# Patient Record
Sex: FEMALE | Race: WHITE | Employment: OTHER | URBAN - METROPOLITAN AREA
[De-identification: names, ages, dates, MRNs, and addresses within clinical notes are randomized per-mention and may not be internally consistent; named-entity substitution may affect disease eponyms.]

---

## 2017-07-10 PROBLEM — J30.1 SEASONAL ALLERGIC RHINITIS DUE TO POLLEN: Status: ACTIVE | Noted: 2017-07-10

## 2018-09-17 ENCOUNTER — HOSPITAL ENCOUNTER (OUTPATIENT)
Age: 73
Discharge: HOME | End: 2018-09-19
Payer: MEDICARE

## 2018-09-17 PROCEDURE — 84436 ASSAY OF TOTAL THYROXINE: CPT

## 2018-09-17 PROCEDURE — 80053 COMPREHEN METABOLIC PANEL: CPT

## 2018-09-17 PROCEDURE — 84479 ASSAY OF THYROID (T3 OR T4): CPT

## 2018-09-17 PROCEDURE — 85025 COMPLETE CBC W/AUTO DIFF WBC: CPT

## 2018-09-17 PROCEDURE — 84480 ASSAY TRIIODOTHYRONINE (T3): CPT

## 2018-09-17 PROCEDURE — 83036 HEMOGLOBIN GLYCOSYLATED A1C: CPT

## 2018-09-17 PROCEDURE — 80061 LIPID PANEL: CPT

## 2018-09-17 PROCEDURE — 84443 ASSAY THYROID STIM HORMONE: CPT

## 2018-09-18 LAB
ALBUMIN SERPL-MCNC: 4.3 G/DL (ref 3.5–5.2)
ALP BLD-CCNC: 48 U/L (ref 35–104)
ALT SERPL-CCNC: 13 U/L (ref 0–32)
ANION GAP SERPL CALCULATED.3IONS-SCNC: 21 MMOL/L (ref 7–16)
AST SERPL-CCNC: 18 U/L (ref 0–31)
BASOPHILS ABSOLUTE: 0.04 E9/L (ref 0–0.2)
BASOPHILS RELATIVE PERCENT: 0.6 % (ref 0–2)
BILIRUB SERPL-MCNC: 1.2 MG/DL (ref 0–1.2)
BUN BLDV-MCNC: 21 MG/DL (ref 8–23)
CALCIUM SERPL-MCNC: 9.3 MG/DL (ref 8.6–10.2)
CHLORIDE BLD-SCNC: 103 MMOL/L (ref 98–107)
CHOLESTEROL, TOTAL: 164 MG/DL (ref 0–199)
CO2: 19 MMOL/L (ref 22–29)
CREAT SERPL-MCNC: 1.1 MG/DL (ref 0.5–1)
EOSINOPHILS ABSOLUTE: 0.14 E9/L (ref 0.05–0.5)
EOSINOPHILS RELATIVE PERCENT: 2 % (ref 0–6)
GFR AFRICAN AMERICAN: 59
GFR NON-AFRICAN AMERICAN: 49 ML/MIN/1.73
GLUCOSE BLD-MCNC: 93 MG/DL (ref 74–109)
HBA1C MFR BLD: 5.7 % (ref 4–5.6)
HCT VFR BLD CALC: 44 % (ref 34–48)
HDLC SERPL-MCNC: 45 MG/DL
HEMOGLOBIN: 13.7 G/DL (ref 11.5–15.5)
IMMATURE GRANULOCYTES #: 0.02 E9/L
IMMATURE GRANULOCYTES %: 0.3 % (ref 0–5)
LDL CHOLESTEROL CALCULATED: 91 MG/DL (ref 0–99)
LYMPHOCYTES ABSOLUTE: 2.16 E9/L (ref 1.5–4)
LYMPHOCYTES RELATIVE PERCENT: 31 % (ref 20–42)
MCH RBC QN AUTO: 31.7 PG (ref 26–35)
MCHC RBC AUTO-ENTMCNC: 31.1 % (ref 32–34.5)
MCV RBC AUTO: 101.9 FL (ref 80–99.9)
MONOCYTES ABSOLUTE: 0.53 E9/L (ref 0.1–0.95)
MONOCYTES RELATIVE PERCENT: 7.6 % (ref 2–12)
NEUTROPHILS ABSOLUTE: 4.08 E9/L (ref 1.8–7.3)
NEUTROPHILS RELATIVE PERCENT: 58.5 % (ref 43–80)
PDW BLD-RTO: 12.9 FL (ref 11.5–15)
PLATELET # BLD: 221 E9/L (ref 130–450)
PMV BLD AUTO: 11.6 FL (ref 7–12)
POTASSIUM SERPL-SCNC: 4.1 MMOL/L (ref 3.5–5)
RBC # BLD: 4.32 E12/L (ref 3.5–5.5)
SODIUM BLD-SCNC: 143 MMOL/L (ref 132–146)
T3 TOTAL: 133.9 NG/DL (ref 80–200)
T3 UPTAKE PERCENT: 33.2 % (ref 22.5–37)
T4 TOTAL: 7.6 MCG/DL (ref 4.5–11.7)
TOTAL PROTEIN: 6.4 G/DL (ref 6.4–8.3)
TRIGL SERPL-MCNC: 142 MG/DL (ref 0–149)
TSH SERPL DL<=0.05 MIU/L-ACNC: 3.14 UIU/ML (ref 0.27–4.2)
VLDLC SERPL CALC-MCNC: 28 MG/DL
WBC # BLD: 7 E9/L (ref 4.5–11.5)

## 2019-01-01 ENCOUNTER — HOSPITAL ENCOUNTER (OUTPATIENT)
Age: 74
Discharge: HOME | End: 2019-07-25
Payer: MEDICARE

## 2019-01-01 ENCOUNTER — HOSPITAL ENCOUNTER (OUTPATIENT)
Age: 74
Discharge: HOME | End: 2019-12-06
Payer: MEDICARE

## 2019-01-01 ENCOUNTER — HOSPITAL ENCOUNTER (OUTPATIENT)
Dept: GENERAL RADIOLOGY | Age: 74
Discharge: HOME | End: 2019-12-06
Payer: MEDICARE

## 2019-01-01 ENCOUNTER — HOSPITAL ENCOUNTER (OUTPATIENT)
Age: 74
Discharge: HOME | End: 2019-12-02
Payer: MEDICARE

## 2019-01-01 ENCOUNTER — HOSPITAL ENCOUNTER (OUTPATIENT)
Dept: GENERAL RADIOLOGY | Age: 74
Discharge: HOME | End: 2019-10-25
Payer: MEDICARE

## 2019-01-01 ENCOUNTER — HOSPITAL ENCOUNTER (OUTPATIENT)
Age: 74
Discharge: HOME | End: 2019-10-25
Payer: MEDICARE

## 2019-01-01 DIAGNOSIS — R05.9 COUGH: ICD-10-CM

## 2019-01-01 DIAGNOSIS — J31.0 CHRONIC RHINITIS: ICD-10-CM

## 2019-01-01 DIAGNOSIS — M54.6 DISCOGENIC THORACIC PAIN: ICD-10-CM

## 2019-01-01 DIAGNOSIS — I10 ESSENTIAL HYPERTENSION: ICD-10-CM

## 2019-01-01 LAB
ANION GAP SERPL CALCULATED.3IONS-SCNC: 13 MMOL/L (ref 7–16)
BUN BLDV-MCNC: 17 MG/DL (ref 8–23)
CALCIUM SERPL-MCNC: 9.4 MG/DL (ref 8.6–10.2)
CHLORIDE BLD-SCNC: 107 MMOL/L (ref 98–107)
CO2: 23 MMOL/L (ref 22–29)
CREAT SERPL-MCNC: 1 MG/DL (ref 0.5–1)
GFR AFRICAN AMERICAN: >60
GFR NON-AFRICAN AMERICAN: 54 ML/MIN/1.73
GLUCOSE BLD-MCNC: 118 MG/DL (ref 74–99)
POTASSIUM SERPL-SCNC: 4.4 MMOL/L (ref 3.5–5)
SODIUM BLD-SCNC: 143 MMOL/L (ref 132–146)

## 2019-01-01 PROCEDURE — 72072 X-RAY EXAM THORAC SPINE 3VWS: CPT

## 2019-01-01 PROCEDURE — 80048 BASIC METABOLIC PNL TOTAL CA: CPT

## 2019-01-01 PROCEDURE — 70220 X-RAY EXAM OF SINUSES: CPT

## 2019-01-01 PROCEDURE — 71046 X-RAY EXAM CHEST 2 VIEWS: CPT

## 2019-02-13 ENCOUNTER — HOSPITAL ENCOUNTER (EMERGENCY)
Age: 74
Discharge: HOME | End: 2019-02-13
Attending: EMERGENCY MEDICINE
Payer: MEDICARE

## 2019-02-13 ENCOUNTER — APPOINTMENT (OUTPATIENT)
Dept: CT IMAGING | Age: 74
End: 2019-02-13
Payer: MEDICARE

## 2019-02-13 VITALS
OXYGEN SATURATION: 95 % | WEIGHT: 138 LBS | RESPIRATION RATE: 16 BRPM | HEIGHT: 61 IN | BODY MASS INDEX: 26.06 KG/M2 | TEMPERATURE: 98.2 F | HEART RATE: 62 BPM | SYSTOLIC BLOOD PRESSURE: 129 MMHG | DIASTOLIC BLOOD PRESSURE: 65 MMHG

## 2019-02-13 DIAGNOSIS — R11.0 NAUSEA: ICD-10-CM

## 2019-02-13 DIAGNOSIS — R10.9 ABDOMINAL PAIN, UNSPECIFIED ABDOMINAL LOCATION: Primary | ICD-10-CM

## 2019-02-13 LAB
ALBUMIN SERPL-MCNC: 4.4 G/DL (ref 3.5–5.2)
ALP BLD-CCNC: 66 U/L (ref 35–104)
ALT SERPL-CCNC: 14 U/L (ref 0–32)
ANION GAP SERPL CALCULATED.3IONS-SCNC: 12 MMOL/L (ref 7–16)
AST SERPL-CCNC: 17 U/L (ref 0–31)
BACTERIA: ABNORMAL /HPF
BASOPHILS ABSOLUTE: 0.05 E9/L (ref 0–0.2)
BASOPHILS RELATIVE PERCENT: 0.6 % (ref 0–2)
BILIRUB SERPL-MCNC: 1.3 MG/DL (ref 0–1.2)
BILIRUBIN URINE: NEGATIVE
BLOOD, URINE: ABNORMAL
BUN BLDV-MCNC: 13 MG/DL (ref 8–23)
CALCIUM SERPL-MCNC: 9.4 MG/DL (ref 8.6–10.2)
CHLORIDE BLD-SCNC: 106 MMOL/L (ref 98–107)
CLARITY: CLEAR
CO2: 26 MMOL/L (ref 22–29)
COLOR: YELLOW
CREAT SERPL-MCNC: 0.9 MG/DL (ref 0.5–1)
EOSINOPHILS ABSOLUTE: 0.12 E9/L (ref 0.05–0.5)
EOSINOPHILS RELATIVE PERCENT: 1.5 % (ref 0–6)
GFR AFRICAN AMERICAN: >60
GFR NON-AFRICAN AMERICAN: >60 ML/MIN/1.73
GLUCOSE BLD-MCNC: 109 MG/DL (ref 74–99)
GLUCOSE URINE: NEGATIVE MG/DL
HCT VFR BLD CALC: 43.5 % (ref 34–48)
HEMOGLOBIN: 14.6 G/DL (ref 11.5–15.5)
IMMATURE GRANULOCYTES #: 0.03 E9/L
IMMATURE GRANULOCYTES %: 0.4 % (ref 0–5)
KETONES, URINE: NEGATIVE MG/DL
LEUKOCYTE ESTERASE, URINE: ABNORMAL
LIPASE: 16 U/L (ref 13–60)
LYMPHOCYTES ABSOLUTE: 1.75 E9/L (ref 1.5–4)
LYMPHOCYTES RELATIVE PERCENT: 22.2 % (ref 20–42)
MCH RBC QN AUTO: 31.9 PG (ref 26–35)
MCHC RBC AUTO-ENTMCNC: 33.6 % (ref 32–34.5)
MCV RBC AUTO: 95.2 FL (ref 80–99.9)
MONOCYTES ABSOLUTE: 0.66 E9/L (ref 0.1–0.95)
MONOCYTES RELATIVE PERCENT: 8.4 % (ref 2–12)
NEUTROPHILS ABSOLUTE: 5.26 E9/L (ref 1.8–7.3)
NEUTROPHILS RELATIVE PERCENT: 66.9 % (ref 43–80)
NITRITE, URINE: NEGATIVE
PDW BLD-RTO: 12.9 FL (ref 11.5–15)
PH UA: 7 (ref 5–9)
PLATELET # BLD: 239 E9/L (ref 130–450)
PMV BLD AUTO: 11.2 FL (ref 7–12)
POTASSIUM REFLEX MAGNESIUM: 4.1 MMOL/L (ref 3.5–5)
PROTEIN UA: NEGATIVE MG/DL
RBC # BLD: 4.57 E12/L (ref 3.5–5.5)
RBC UA: ABNORMAL /HPF (ref 0–2)
SODIUM BLD-SCNC: 144 MMOL/L (ref 132–146)
SPECIFIC GRAVITY UA: <=1.005 (ref 1–1.03)
TOTAL PROTEIN: 6.8 G/DL (ref 6.4–8.3)
TROPONIN: <0.01 NG/ML (ref 0–0.03)
UROBILINOGEN, URINE: 0.2 E.U./DL
WBC # BLD: 7.9 E9/L (ref 4.5–11.5)
WBC UA: ABNORMAL /HPF (ref 0–5)

## 2019-02-13 PROCEDURE — 99284 EMERGENCY DEPT VISIT MOD MDM: CPT

## 2019-02-13 PROCEDURE — 6360000004 HC RX CONTRAST MEDICATION: Performed by: RADIOLOGY

## 2019-02-13 PROCEDURE — 6360000002 HC RX W HCPCS: Performed by: EMERGENCY MEDICINE

## 2019-02-13 PROCEDURE — 84484 ASSAY OF TROPONIN QUANT: CPT

## 2019-02-13 PROCEDURE — 96374 THER/PROPH/DIAG INJ IV PUSH: CPT

## 2019-02-13 PROCEDURE — 2580000003 HC RX 258: Performed by: RADIOLOGY

## 2019-02-13 PROCEDURE — 81001 URINALYSIS AUTO W/SCOPE: CPT

## 2019-02-13 PROCEDURE — 96375 TX/PRO/DX INJ NEW DRUG ADDON: CPT

## 2019-02-13 PROCEDURE — 6370000000 HC RX 637 (ALT 250 FOR IP): Performed by: EMERGENCY MEDICINE

## 2019-02-13 PROCEDURE — 80053 COMPREHEN METABOLIC PANEL: CPT

## 2019-02-13 PROCEDURE — 2500000003 HC RX 250 WO HCPCS: Performed by: EMERGENCY MEDICINE

## 2019-02-13 PROCEDURE — 74177 CT ABD & PELVIS W/CONTRAST: CPT

## 2019-02-13 PROCEDURE — 83690 ASSAY OF LIPASE: CPT

## 2019-02-13 PROCEDURE — 85025 COMPLETE CBC W/AUTO DIFF WBC: CPT

## 2019-02-13 PROCEDURE — 2580000003 HC RX 258: Performed by: EMERGENCY MEDICINE

## 2019-02-13 RX ORDER — ACETAMINOPHEN 325 MG/1
650 TABLET ORAL ONCE
Status: COMPLETED | OUTPATIENT
Start: 2019-02-13 | End: 2019-02-13

## 2019-02-13 RX ORDER — ONDANSETRON 2 MG/ML
4 INJECTION INTRAMUSCULAR; INTRAVENOUS ONCE
Status: COMPLETED | OUTPATIENT
Start: 2019-02-13 | End: 2019-02-13

## 2019-02-13 RX ORDER — 0.9 % SODIUM CHLORIDE 0.9 %
1000 INTRAVENOUS SOLUTION INTRAVENOUS ONCE
Status: COMPLETED | OUTPATIENT
Start: 2019-02-13 | End: 2019-02-13

## 2019-02-13 RX ORDER — SODIUM CHLORIDE 0.9 % (FLUSH) 0.9 %
10 SYRINGE (ML) INJECTION ONCE
Status: COMPLETED | OUTPATIENT
Start: 2019-02-13 | End: 2019-02-13

## 2019-02-13 RX ORDER — FAMOTIDINE 20 MG/1
20 TABLET, FILM COATED ORAL 2 TIMES DAILY
Qty: 14 TABLET | Refills: 0 | Status: SHIPPED | OUTPATIENT
Start: 2019-02-13 | End: 2019-01-01 | Stop reason: ALTCHOICE

## 2019-02-13 RX ORDER — DOCUSATE SODIUM 100 MG/1
100 CAPSULE, LIQUID FILLED ORAL 2 TIMES DAILY
Qty: 20 CAPSULE | Refills: 0 | Status: SHIPPED | OUTPATIENT
Start: 2019-02-13 | End: 2019-02-23

## 2019-02-13 RX ORDER — ONDANSETRON 4 MG/1
4 TABLET, ORALLY DISINTEGRATING ORAL EVERY 8 HOURS PRN
Qty: 10 TABLET | Refills: 0 | Status: SHIPPED | OUTPATIENT
Start: 2019-02-13 | End: 2019-04-08

## 2019-02-13 RX ADMIN — FAMOTIDINE 20 MG: 10 INJECTION, SOLUTION INTRAVENOUS at 12:00

## 2019-02-13 RX ADMIN — ACETAMINOPHEN 650 MG: 325 TABLET ORAL at 14:11

## 2019-02-13 RX ADMIN — SODIUM CHLORIDE 1000 ML: 9 INJECTION, SOLUTION INTRAVENOUS at 12:00

## 2019-02-13 RX ADMIN — ONDANSETRON 4 MG: 2 INJECTION INTRAMUSCULAR; INTRAVENOUS at 12:00

## 2019-02-13 RX ADMIN — IOPAMIDOL 110 ML: 755 INJECTION, SOLUTION INTRAVENOUS at 13:05

## 2019-02-13 RX ADMIN — Medication 10 ML: at 13:05

## 2019-02-13 ASSESSMENT — ENCOUNTER SYMPTOMS
COUGH: 0
SINUS PAIN: 0
BLOOD IN STOOL: 0
CHEST TIGHTNESS: 0
SHORTNESS OF BREATH: 0
NAUSEA: 1
VOMITING: 0
BACK PAIN: 0
DIARRHEA: 0
SORE THROAT: 0
ABDOMINAL PAIN: 1

## 2019-02-13 ASSESSMENT — PAIN DESCRIPTION - LOCATION: LOCATION: ABDOMEN

## 2019-02-13 ASSESSMENT — PAIN SCALES - GENERAL
PAINLEVEL_OUTOF10: 4
PAINLEVEL_OUTOF10: 4
PAINLEVEL_OUTOF10: 5

## 2019-02-13 ASSESSMENT — PAIN DESCRIPTION - DESCRIPTORS: DESCRIPTORS: ACHING

## 2019-02-13 ASSESSMENT — PAIN DESCRIPTION - ORIENTATION: ORIENTATION: LOWER

## 2019-02-24 LAB
EKG ATRIAL RATE: 60 BPM
EKG P AXIS: 76 DEGREES
EKG P-R INTERVAL: 148 MS
EKG Q-T INTERVAL: 442 MS
EKG QRS DURATION: 86 MS
EKG QTC CALCULATION (BAZETT): 442 MS
EKG R AXIS: 60 DEGREES
EKG T AXIS: 55 DEGREES
EKG VENTRICULAR RATE: 60 BPM

## 2019-03-25 ENCOUNTER — HOSPITAL ENCOUNTER (OUTPATIENT)
Age: 74
Discharge: HOME | End: 2019-03-27
Payer: MEDICARE

## 2019-03-25 DIAGNOSIS — E78.5 HYPERLIPIDEMIA, UNSPECIFIED HYPERLIPIDEMIA TYPE: ICD-10-CM

## 2019-03-25 DIAGNOSIS — E03.9 HYPOTHYROIDISM, UNSPECIFIED TYPE: ICD-10-CM

## 2019-03-25 LAB
ALBUMIN SERPL-MCNC: 4.6 G/DL (ref 3.5–5.2)
ALP BLD-CCNC: 54 U/L (ref 35–104)
ALT SERPL-CCNC: 16 U/L (ref 0–32)
ANION GAP SERPL CALCULATED.3IONS-SCNC: 15 MMOL/L (ref 7–16)
AST SERPL-CCNC: 23 U/L (ref 0–31)
BASOPHILS ABSOLUTE: 0.04 E9/L (ref 0–0.2)
BASOPHILS RELATIVE PERCENT: 0.6 % (ref 0–2)
BILIRUB SERPL-MCNC: 1.1 MG/DL (ref 0–1.2)
BUN BLDV-MCNC: 18 MG/DL (ref 8–23)
CALCIUM SERPL-MCNC: 9.5 MG/DL (ref 8.6–10.2)
CHLORIDE BLD-SCNC: 106 MMOL/L (ref 98–107)
CHOLESTEROL, TOTAL: 169 MG/DL (ref 0–199)
CO2: 25 MMOL/L (ref 22–29)
CREAT SERPL-MCNC: 1 MG/DL (ref 0.5–1)
EOSINOPHILS ABSOLUTE: 0.18 E9/L (ref 0.05–0.5)
EOSINOPHILS RELATIVE PERCENT: 2.6 % (ref 0–6)
GFR AFRICAN AMERICAN: >60
GFR NON-AFRICAN AMERICAN: 54 ML/MIN/1.73
GLUCOSE FASTING: 100 MG/DL (ref 74–99)
HCT VFR BLD CALC: 45.4 % (ref 34–48)
HDLC SERPL-MCNC: 46 MG/DL
HEMOGLOBIN: 14.1 G/DL (ref 11.5–15.5)
IMMATURE GRANULOCYTES #: 0.02 E9/L
IMMATURE GRANULOCYTES %: 0.3 % (ref 0–5)
LDL CHOLESTEROL CALCULATED: 93 MG/DL (ref 0–99)
LYMPHOCYTES ABSOLUTE: 2.17 E9/L (ref 1.5–4)
LYMPHOCYTES RELATIVE PERCENT: 31 % (ref 20–42)
MCH RBC QN AUTO: 31.4 PG (ref 26–35)
MCHC RBC AUTO-ENTMCNC: 31.1 % (ref 32–34.5)
MCV RBC AUTO: 101.1 FL (ref 80–99.9)
MONOCYTES ABSOLUTE: 0.47 E9/L (ref 0.1–0.95)
MONOCYTES RELATIVE PERCENT: 6.7 % (ref 2–12)
NEUTROPHILS ABSOLUTE: 4.13 E9/L (ref 1.8–7.3)
NEUTROPHILS RELATIVE PERCENT: 58.8 % (ref 43–80)
PDW BLD-RTO: 13.4 FL (ref 11.5–15)
PLATELET # BLD: 265 E9/L (ref 130–450)
PMV BLD AUTO: 11.7 FL (ref 7–12)
POTASSIUM SERPL-SCNC: 4.9 MMOL/L (ref 3.5–5)
RBC # BLD: 4.49 E12/L (ref 3.5–5.5)
SODIUM BLD-SCNC: 146 MMOL/L (ref 132–146)
TOTAL PROTEIN: 7.1 G/DL (ref 6.4–8.3)
TRIGL SERPL-MCNC: 149 MG/DL (ref 0–149)
TSH SERPL DL<=0.05 MIU/L-ACNC: 3.32 UIU/ML (ref 0.27–4.2)
VLDLC SERPL CALC-MCNC: 30 MG/DL
WBC # BLD: 7 E9/L (ref 4.5–11.5)

## 2019-03-25 PROCEDURE — 85025 COMPLETE CBC W/AUTO DIFF WBC: CPT

## 2019-03-25 PROCEDURE — 80061 LIPID PANEL: CPT

## 2019-03-25 PROCEDURE — 80053 COMPREHEN METABOLIC PANEL: CPT

## 2019-03-25 PROCEDURE — 84443 ASSAY THYROID STIM HORMONE: CPT

## 2019-04-08 PROBLEM — I10 ESSENTIAL HYPERTENSION: Status: ACTIVE | Noted: 2019-04-08

## 2019-04-08 PROBLEM — E03.9 ACQUIRED HYPOTHYROIDISM: Status: ACTIVE | Noted: 2019-04-08

## 2019-04-08 PROBLEM — E78.2 MIXED HYPERLIPIDEMIA: Status: ACTIVE | Noted: 2019-04-08

## 2019-04-23 ENCOUNTER — HOSPITAL ENCOUNTER (OUTPATIENT)
Dept: MAMMOGRAPHY | Age: 74
Discharge: HOME | End: 2019-04-25
Payer: MEDICARE

## 2019-04-23 DIAGNOSIS — M81.0 OSTEOPOROSIS WITHOUT CURRENT PATHOLOGICAL FRACTURE, UNSPECIFIED OSTEOPOROSIS TYPE: ICD-10-CM

## 2019-04-23 PROCEDURE — 77080 DXA BONE DENSITY AXIAL: CPT

## 2019-05-06 ENCOUNTER — HOSPITAL ENCOUNTER (OUTPATIENT)
Age: 74
Discharge: HOME | End: 2019-05-08
Payer: MEDICARE

## 2019-05-06 DIAGNOSIS — N39.43 DRIBBLING OF URINE: ICD-10-CM

## 2019-05-06 DIAGNOSIS — R30.9 PAINFUL URINATION: ICD-10-CM

## 2019-05-06 PROCEDURE — 87088 URINE BACTERIA CULTURE: CPT

## 2019-05-06 PROCEDURE — 81001 URINALYSIS AUTO W/SCOPE: CPT

## 2019-05-07 LAB
BACTERIA: ABNORMAL /HPF
BILIRUBIN URINE: NEGATIVE
BLOOD, URINE: NEGATIVE
CLARITY: CLEAR
COLOR: YELLOW
GLUCOSE URINE: NEGATIVE MG/DL
KETONES, URINE: NEGATIVE MG/DL
LEUKOCYTE ESTERASE, URINE: ABNORMAL
NITRITE, URINE: NEGATIVE
PH UA: 5.5 (ref 5–9)
PROTEIN UA: ABNORMAL MG/DL
RBC UA: ABNORMAL /HPF (ref 0–2)
SPECIFIC GRAVITY UA: 1.02 (ref 1–1.03)
UROBILINOGEN, URINE: 0.2 E.U./DL
WBC UA: ABNORMAL /HPF (ref 0–5)

## 2019-05-08 LAB — URINE CULTURE, ROUTINE: NORMAL

## 2019-05-17 ENCOUNTER — HOSPITAL ENCOUNTER (OUTPATIENT)
Age: 74
Discharge: HOME | End: 2019-05-19

## 2019-05-17 PROCEDURE — 87081 CULTURE SCREEN ONLY: CPT

## 2019-05-17 PROCEDURE — 88305 TISSUE EXAM BY PATHOLOGIST: CPT

## 2019-05-18 LAB — CLOTEST: NORMAL

## 2019-10-21 PROBLEM — M54.6 DISCOGENIC THORACIC PAIN: Status: ACTIVE | Noted: 2019-01-01

## 2019-10-21 PROBLEM — R05.9 COUGH: Status: ACTIVE | Noted: 2019-01-01

## 2019-11-20 PROBLEM — R05.9 COUGH: Status: RESOLVED | Noted: 2019-01-01 | Resolved: 2019-01-01

## 2019-11-27 PROBLEM — J32.9 CHRONIC SINUSITIS: Status: ACTIVE | Noted: 2019-01-01

## 2020-01-01 ENCOUNTER — HOSPITAL ENCOUNTER (OUTPATIENT)
Age: 75
Setting detail: OBSERVATION
Discharge: HOME | End: 2020-04-29
Attending: EMERGENCY MEDICINE | Admitting: FAMILY MEDICINE
Payer: MEDICARE

## 2020-01-01 ENCOUNTER — APPOINTMENT (OUTPATIENT)
Dept: ULTRASOUND IMAGING | Age: 75
DRG: 003 | End: 2020-01-01
Payer: MEDICARE

## 2020-01-01 ENCOUNTER — APPOINTMENT (OUTPATIENT)
Dept: GENERAL RADIOLOGY | Age: 75
DRG: 003 | End: 2020-01-01
Payer: MEDICARE

## 2020-01-01 ENCOUNTER — HOSPITAL ENCOUNTER (OUTPATIENT)
Age: 75
Discharge: HOME | End: 2020-03-13
Payer: MEDICARE

## 2020-01-01 ENCOUNTER — ANESTHESIA (OUTPATIENT)
Dept: OPERATING ROOM | Age: 75
DRG: 003 | End: 2020-01-01
Payer: MEDICARE

## 2020-01-01 ENCOUNTER — APPOINTMENT (OUTPATIENT)
Dept: CT IMAGING | Age: 75
End: 2020-01-01
Payer: MEDICARE

## 2020-01-01 ENCOUNTER — APPOINTMENT (OUTPATIENT)
Dept: NON INVASIVE DIAGNOSTICS | Age: 75
DRG: 003 | End: 2020-01-01
Payer: MEDICARE

## 2020-01-01 ENCOUNTER — HOSPITAL ENCOUNTER (INPATIENT)
Age: 75
LOS: 11 days | DRG: 003 | End: 2020-06-08
Attending: EMERGENCY MEDICINE | Admitting: FAMILY MEDICINE
Payer: MEDICARE

## 2020-01-01 ENCOUNTER — APPOINTMENT (OUTPATIENT)
Dept: CARDIAC CATH/INVASIVE PROCEDURES | Age: 75
DRG: 003 | End: 2020-01-01
Payer: MEDICARE

## 2020-01-01 ENCOUNTER — APPOINTMENT (OUTPATIENT)
Dept: NUCLEAR MEDICINE | Age: 75
DRG: 003 | End: 2020-01-01
Payer: MEDICARE

## 2020-01-01 ENCOUNTER — CARE COORDINATION (OUTPATIENT)
Dept: CARE COORDINATION | Age: 75
End: 2020-01-01

## 2020-01-01 ENCOUNTER — HOSPITAL ENCOUNTER (OUTPATIENT)
Age: 75
Discharge: HOME | End: 2020-05-24
Payer: MEDICARE

## 2020-01-01 ENCOUNTER — APPOINTMENT (OUTPATIENT)
Dept: CT IMAGING | Age: 75
DRG: 003 | End: 2020-01-01
Payer: MEDICARE

## 2020-01-01 ENCOUNTER — ANESTHESIA EVENT (OUTPATIENT)
Dept: OPERATING ROOM | Age: 75
DRG: 003 | End: 2020-01-01
Payer: MEDICARE

## 2020-01-01 ENCOUNTER — APPOINTMENT (OUTPATIENT)
Dept: GENERAL RADIOLOGY | Age: 75
End: 2020-01-01
Payer: MEDICARE

## 2020-01-01 ENCOUNTER — APPOINTMENT (OUTPATIENT)
Dept: INTERVENTIONAL RADIOLOGY/VASCULAR | Age: 75
DRG: 003 | End: 2020-01-01
Payer: MEDICARE

## 2020-01-01 ENCOUNTER — TELEPHONE (OUTPATIENT)
Dept: PULMONOLOGY | Age: 75
End: 2020-01-01

## 2020-01-01 ENCOUNTER — HOSPITAL ENCOUNTER (EMERGENCY)
Age: 75
Discharge: HOME | End: 2020-05-23
Attending: EMERGENCY MEDICINE
Payer: MEDICARE

## 2020-01-01 ENCOUNTER — HOSPITAL ENCOUNTER (OUTPATIENT)
Age: 75
Discharge: HOME | End: 2020-01-31
Payer: MEDICARE

## 2020-01-01 ENCOUNTER — HOSPITAL ENCOUNTER (OUTPATIENT)
Age: 75
Discharge: HOME | End: 2020-05-03
Payer: MEDICARE

## 2020-01-01 ENCOUNTER — APPOINTMENT (OUTPATIENT)
Dept: ULTRASOUND IMAGING | Age: 75
End: 2020-01-01
Payer: MEDICARE

## 2020-01-01 ENCOUNTER — HOSPITAL ENCOUNTER (OUTPATIENT)
Dept: GENERAL RADIOLOGY | Age: 75
Discharge: HOME | End: 2020-05-03
Payer: MEDICARE

## 2020-01-01 ENCOUNTER — HOSPITAL ENCOUNTER (EMERGENCY)
Age: 75
Discharge: HOME | End: 2020-05-20
Attending: EMERGENCY MEDICINE
Payer: MEDICARE

## 2020-01-01 VITALS
OXYGEN SATURATION: 96 % | TEMPERATURE: 98.2 F | RESPIRATION RATE: 18 BRPM | HEIGHT: 61 IN | WEIGHT: 137 LBS | HEART RATE: 95 BPM | BODY MASS INDEX: 25.86 KG/M2 | SYSTOLIC BLOOD PRESSURE: 137 MMHG | DIASTOLIC BLOOD PRESSURE: 63 MMHG

## 2020-01-01 VITALS — OXYGEN SATURATION: 98 % | SYSTOLIC BLOOD PRESSURE: 139 MMHG | DIASTOLIC BLOOD PRESSURE: 59 MMHG | TEMPERATURE: 89.1 F

## 2020-01-01 VITALS
SYSTOLIC BLOOD PRESSURE: 136 MMHG | RESPIRATION RATE: 16 BRPM | HEIGHT: 61 IN | WEIGHT: 137.2 LBS | DIASTOLIC BLOOD PRESSURE: 61 MMHG | HEART RATE: 67 BPM | BODY MASS INDEX: 25.9 KG/M2 | TEMPERATURE: 97.3 F | OXYGEN SATURATION: 96 %

## 2020-01-01 VITALS — OXYGEN SATURATION: 93 %

## 2020-01-01 VITALS
DIASTOLIC BLOOD PRESSURE: 82 MMHG | SYSTOLIC BLOOD PRESSURE: 153 MMHG | WEIGHT: 136 LBS | HEIGHT: 61 IN | HEART RATE: 91 BPM | RESPIRATION RATE: 16 BRPM | OXYGEN SATURATION: 96 % | TEMPERATURE: 99 F | BODY MASS INDEX: 25.68 KG/M2

## 2020-01-01 VITALS — OXYGEN SATURATION: 98 %

## 2020-01-01 LAB
ABO/RH: NORMAL
ACTIVATED CLOTTING TIME: 102 SECONDS (ref 99–130)
ACTIVATED CLOTTING TIME: 115 SECONDS (ref 99–130)
ACTIVATED CLOTTING TIME: 123 SECONDS (ref 99–130)
ACTIVATED CLOTTING TIME: 180 SECONDS (ref 99–130)
ACTIVATED CLOTTING TIME: 419 SECONDS (ref 99–130)
ACTIVATED CLOTTING TIME: 425 SECONDS (ref 99–130)
ACTIVATED CLOTTING TIME: 443 SECONDS (ref 99–130)
ACTIVATED CLOTTING TIME: 478 SECONDS (ref 99–130)
ACTIVATED CLOTTING TIME: 634 SECONDS (ref 99–130)
ACTIVATED CLOTTING TIME: 649 SECONDS (ref 99–130)
ALBUMIN SERPL-MCNC: 1 G/DL (ref 3.5–5.2)
ALBUMIN SERPL-MCNC: 1.9 G/DL (ref 3.5–5.2)
ALBUMIN SERPL-MCNC: 2 G/DL (ref 3.5–5.2)
ALBUMIN SERPL-MCNC: 2.1 G/DL (ref 3.5–5.2)
ALBUMIN SERPL-MCNC: 2.1 G/DL (ref 3.5–5.2)
ALBUMIN SERPL-MCNC: 2.2 G/DL (ref 3.5–5.2)
ALBUMIN SERPL-MCNC: 2.7 G/DL (ref 3.5–5.2)
ALBUMIN SERPL-MCNC: 2.8 G/DL (ref 3.5–5.2)
ALBUMIN SERPL-MCNC: 3.2 G/DL (ref 3.5–5.2)
ALBUMIN SERPL-MCNC: 3.2 G/DL (ref 3.5–5.2)
ALBUMIN SERPL-MCNC: 4.1 G/DL (ref 3.5–5.2)
ALBUMIN SERPL-MCNC: 4.4 G/DL (ref 3.5–5.2)
ALBUMIN SERPL-MCNC: 4.6 G/DL (ref 3.5–5.2)
ALBUMIN SERPL-MCNC: 4.7 G/DL (ref 3.5–5.2)
ALP BLD-CCNC: 17 U/L (ref 35–104)
ALP BLD-CCNC: 25 U/L (ref 35–104)
ALP BLD-CCNC: 26 U/L (ref 35–104)
ALP BLD-CCNC: 28 U/L (ref 35–104)
ALP BLD-CCNC: 30 U/L (ref 35–104)
ALP BLD-CCNC: 31 U/L (ref 35–104)
ALP BLD-CCNC: 34 U/L (ref 35–104)
ALP BLD-CCNC: 36 U/L (ref 35–104)
ALP BLD-CCNC: 45 U/L (ref 35–104)
ALP BLD-CCNC: 45 U/L (ref 35–104)
ALP BLD-CCNC: 51 U/L (ref 35–104)
ALP BLD-CCNC: 57 U/L (ref 35–104)
ALP BLD-CCNC: 58 U/L (ref 35–104)
ALP BLD-CCNC: 6 U/L (ref 35–104)
ALP BLD-CCNC: 63 U/L (ref 35–104)
ALP BLD-CCNC: 68 U/L (ref 35–104)
ALT SERPL-CCNC: 10 U/L (ref 0–32)
ALT SERPL-CCNC: 15 U/L (ref 0–32)
ALT SERPL-CCNC: 17 U/L (ref 0–32)
ALT SERPL-CCNC: 19 U/L (ref 0–32)
ALT SERPL-CCNC: 24 U/L (ref 0–32)
ALT SERPL-CCNC: 25 U/L (ref 0–32)
ALT SERPL-CCNC: 27 U/L (ref 0–32)
ALT SERPL-CCNC: 28 U/L (ref 0–32)
ALT SERPL-CCNC: 28 U/L (ref 0–32)
ALT SERPL-CCNC: 30 U/L (ref 0–32)
ALT SERPL-CCNC: 36 U/L (ref 0–32)
ALT SERPL-CCNC: 39 U/L (ref 0–32)
ALT SERPL-CCNC: 40 U/L (ref 0–32)
ANGLE (CLOT STRENGTH): 50.2 DEGREE (ref 59–74)
ANGLE (CLOT STRENGTH): 52.8 DEGREE (ref 59–74)
ANGLE (CLOT STRENGTH): 53.6 DEGREE (ref 59–74)
ANGLE (CLOT STRENGTH): 55.8 DEGREE (ref 59–74)
ANGLE (CLOT STRENGTH): 62.3 DEGREE (ref 59–74)
ANION GAP SERPL CALCULATED.3IONS-SCNC: 10 MMOL/L (ref 7–16)
ANION GAP SERPL CALCULATED.3IONS-SCNC: 10 MMOL/L (ref 7–16)
ANION GAP SERPL CALCULATED.3IONS-SCNC: 11 MMOL/L (ref 7–16)
ANION GAP SERPL CALCULATED.3IONS-SCNC: 12 MMOL/L (ref 7–16)
ANION GAP SERPL CALCULATED.3IONS-SCNC: 12 MMOL/L (ref 7–16)
ANION GAP SERPL CALCULATED.3IONS-SCNC: 14 MMOL/L (ref 7–16)
ANION GAP SERPL CALCULATED.3IONS-SCNC: 15 MMOL/L (ref 7–16)
ANION GAP SERPL CALCULATED.3IONS-SCNC: 16 MMOL/L (ref 7–16)
ANION GAP SERPL CALCULATED.3IONS-SCNC: 17 MMOL/L (ref 7–16)
ANION GAP SERPL CALCULATED.3IONS-SCNC: 18 MMOL/L (ref 7–16)
ANION GAP SERPL CALCULATED.3IONS-SCNC: 18 MMOL/L (ref 7–16)
ANION GAP SERPL CALCULATED.3IONS-SCNC: 21 MMOL/L (ref 7–16)
ANION GAP SERPL CALCULATED.3IONS-SCNC: 22 MMOL/L (ref 7–16)
ANION GAP SERPL CALCULATED.3IONS-SCNC: 26 MMOL/L (ref 7–16)
ANION GAP SERPL CALCULATED.3IONS-SCNC: 7 MMOL/L (ref 7–16)
ANION GAP SERPL CALCULATED.3IONS-SCNC: 9 MMOL/L (ref 7–16)
ANTI-NUCLEAR ANTIBODY (ANA): NEGATIVE
ANTIBODY SCREEN: NORMAL
APTT: 100.8 SEC (ref 24.5–35.1)
APTT: 111.3 SEC (ref 24.5–35.1)
APTT: 152.6 SEC (ref 24.5–35.1)
APTT: 153.7 SEC (ref 24.5–35.1)
APTT: 156.2 SEC (ref 24.5–35.1)
APTT: 160.1 SEC (ref 24.5–35.1)
APTT: 44.2 SEC (ref 24.5–35.1)
APTT: 44.6 SEC (ref 24.5–35.1)
APTT: 45.5 SEC (ref 24.5–35.1)
APTT: 46.5 SEC (ref 24.5–35.1)
APTT: 49 SEC (ref 24.5–35.1)
APTT: 49.3 SEC (ref 24.5–35.1)
APTT: 52.1 SEC (ref 24.5–35.1)
APTT: 53.7 SEC (ref 24.5–35.1)
APTT: 53.7 SEC (ref 24.5–35.1)
APTT: 58 SEC (ref 24.5–35.1)
APTT: 65.9 SEC (ref 24.5–35.1)
APTT: 68.3 SEC (ref 24.5–35.1)
APTT: 69.2 SEC (ref 24.5–35.1)
APTT: 79.7 SEC (ref 24.5–35.1)
APTT: 93.4 SEC (ref 24.5–35.1)
APTT: >240 SEC (ref 24.5–35.1)
AST SERPL-CCNC: 122 U/L (ref 0–31)
AST SERPL-CCNC: 123 U/L (ref 0–31)
AST SERPL-CCNC: 130 U/L (ref 0–31)
AST SERPL-CCNC: 149 U/L (ref 0–31)
AST SERPL-CCNC: 162 U/L (ref 0–31)
AST SERPL-CCNC: 19 U/L (ref 0–31)
AST SERPL-CCNC: 192 U/L (ref 0–31)
AST SERPL-CCNC: 20 U/L (ref 0–31)
AST SERPL-CCNC: 20 U/L (ref 0–31)
AST SERPL-CCNC: 226 U/L (ref 0–31)
AST SERPL-CCNC: 23 U/L (ref 0–31)
AST SERPL-CCNC: 235 U/L (ref 0–31)
AST SERPL-CCNC: 26 U/L (ref 0–31)
AST SERPL-CCNC: 283 U/L (ref 0–31)
AST SERPL-CCNC: 307 U/L (ref 0–31)
AST SERPL-CCNC: 71 U/L (ref 0–31)
AT-III ACTIVITY: 20 % ACTIVITY (ref 83–121)
B.E.: -0.3 MMOL/L (ref -3–0)
B.E.: -0.6 MMOL/L (ref -3–0)
B.E.: -0.7 MMOL/L (ref -3–0)
B.E.: -0.9 MMOL/L (ref -3–0)
B.E.: -0.9 MMOL/L (ref -3–0)
B.E.: -1.1 MMOL/L (ref -3–0)
B.E.: -1.1 MMOL/L (ref -3–0)
B.E.: -1.2 MMOL/L (ref -3–0)
B.E.: -1.3 MMOL/L (ref -3–0)
B.E.: -1.4 MMOL/L (ref -3–0)
B.E.: -1.4 MMOL/L (ref -3–0)
B.E.: -1.5 MMOL/L (ref -3–0)
B.E.: -1.6 MMOL/L (ref -3–0)
B.E.: -1.6 MMOL/L (ref -3–0)
B.E.: -1.8 MMOL/L (ref -3–0)
B.E.: -12.9 MMOL/L (ref -3–0)
B.E.: -19.5 MMOL/L (ref -3–3)
B.E.: -2.1 MMOL/L (ref -3–0)
B.E.: -2.2 MMOL/L (ref -3–0)
B.E.: -2.2 MMOL/L (ref -3–0)
B.E.: -2.5 MMOL/L (ref -3–0)
B.E.: -2.5 MMOL/L (ref -3–0)
B.E.: -2.8 MMOL/L (ref -3–0)
B.E.: -3 MMOL/L (ref -3–3)
B.E.: -3.5 MMOL/L (ref -3–0)
B.E.: -3.6 MMOL/L (ref -3–0)
B.E.: -3.6 MMOL/L (ref -3–0)
B.E.: -3.9 MMOL/L (ref -3–0)
B.E.: -4.3 MMOL/L (ref -3–0)
B.E.: -4.8 MMOL/L (ref -3–0)
B.E.: -4.9 MMOL/L (ref -3–0)
B.E.: -5 MMOL/L (ref -3–0)
B.E.: -5.7 MMOL/L (ref -3–0)
B.E.: -5.8 MMOL/L (ref -3–0)
B.E.: -7.4 MMOL/L (ref -3–0)
B.E.: -7.6 MMOL/L (ref -3–0)
B.E.: -8.1 MMOL/L (ref -3–0)
B.E.: -8.6 MMOL/L (ref -3–0)
B.E.: 0 MMOL/L (ref -3–0)
B.E.: 0 MMOL/L (ref -3–0)
B.E.: 0.6 MMOL/L (ref -3–0)
B.E.: 0.8 MMOL/L (ref -3–0)
B.E.: 1.6 MMOL/L (ref -3–0)
B.E.: ABNORMAL MMOL/L (ref -3–0)
BACTERIA: ABNORMAL /HPF
BACTERIA: ABNORMAL /HPF
BACTERIA: NORMAL /HPF
BACTERIA: NORMAL /HPF
BASOPHILS ABSOLUTE: 0.03 E9/L (ref 0–0.2)
BASOPHILS ABSOLUTE: 0.05 E9/L (ref 0–0.2)
BASOPHILS ABSOLUTE: 0.06 E9/L (ref 0–0.2)
BASOPHILS ABSOLUTE: 0.07 E9/L (ref 0–0.2)
BASOPHILS ABSOLUTE: 0.08 E9/L (ref 0–0.2)
BASOPHILS RELATIVE PERCENT: 0.2 % (ref 0–2)
BASOPHILS RELATIVE PERCENT: 0.7 % (ref 0–2)
BASOPHILS RELATIVE PERCENT: 0.7 % (ref 0–2)
BASOPHILS RELATIVE PERCENT: 0.8 % (ref 0–2)
BASOPHILS RELATIVE PERCENT: 1 % (ref 0–2)
BILIRUB SERPL-MCNC: 0.4 MG/DL (ref 0–1.2)
BILIRUB SERPL-MCNC: 0.4 MG/DL (ref 0–1.2)
BILIRUB SERPL-MCNC: 0.5 MG/DL (ref 0–1.2)
BILIRUB SERPL-MCNC: 0.5 MG/DL (ref 0–1.2)
BILIRUB SERPL-MCNC: 0.6 MG/DL (ref 0–1.2)
BILIRUB SERPL-MCNC: 0.6 MG/DL (ref 0–1.2)
BILIRUB SERPL-MCNC: 0.7 MG/DL (ref 0–1.2)
BILIRUB SERPL-MCNC: 0.8 MG/DL (ref 0–1.2)
BILIRUB SERPL-MCNC: 1.1 MG/DL (ref 0–1.2)
BILIRUB SERPL-MCNC: 1.5 MG/DL (ref 0–1.2)
BILIRUBIN URINE: NEGATIVE
BLOOD BANK DISPENSE STATUS: NORMAL
BLOOD BANK PRODUCT CODE: NORMAL
BLOOD, URINE: NEGATIVE
BPU ID: NORMAL
BUN BLDV-MCNC: 11 MG/DL (ref 8–23)
BUN BLDV-MCNC: 11 MG/DL (ref 8–23)
BUN BLDV-MCNC: 12 MG/DL (ref 8–23)
BUN BLDV-MCNC: 13 MG/DL (ref 8–23)
BUN BLDV-MCNC: 13 MG/DL (ref 8–23)
BUN BLDV-MCNC: 14 MG/DL (ref 8–23)
BUN BLDV-MCNC: 15 MG/DL (ref 8–23)
BUN BLDV-MCNC: 16 MG/DL (ref 8–23)
BUN BLDV-MCNC: 16 MG/DL (ref 8–23)
BUN BLDV-MCNC: 17 MG/DL (ref 8–23)
BUN BLDV-MCNC: 18 MG/DL (ref 8–23)
BUN BLDV-MCNC: 20 MG/DL (ref 8–23)
BUN BLDV-MCNC: 20 MG/DL (ref 8–23)
BUN BLDV-MCNC: 23 MG/DL (ref 8–23)
BUN BLDV-MCNC: 23 MG/DL (ref 8–23)
BUN BLDV-MCNC: 26 MG/DL (ref 8–23)
BUN BLDV-MCNC: 29 MG/DL (ref 8–23)
BUN BLDV-MCNC: 5 MG/DL (ref 8–23)
CALCIUM SERPL-MCNC: 10 MG/DL (ref 8.6–10.2)
CALCIUM SERPL-MCNC: 6.2 MG/DL (ref 8.6–10.2)
CALCIUM SERPL-MCNC: 7.1 MG/DL (ref 8.6–10.2)
CALCIUM SERPL-MCNC: 7.2 MG/DL (ref 8.6–10.2)
CALCIUM SERPL-MCNC: 7.2 MG/DL (ref 8.6–10.2)
CALCIUM SERPL-MCNC: 7.5 MG/DL (ref 8.6–10.2)
CALCIUM SERPL-MCNC: 7.7 MG/DL (ref 8.6–10.2)
CALCIUM SERPL-MCNC: 7.8 MG/DL (ref 8.6–10.2)
CALCIUM SERPL-MCNC: 8 MG/DL (ref 8.6–10.2)
CALCIUM SERPL-MCNC: 8.7 MG/DL (ref 8.6–10.2)
CALCIUM SERPL-MCNC: 9 MG/DL (ref 8.6–10.2)
CALCIUM SERPL-MCNC: 9.1 MG/DL (ref 8.6–10.2)
CALCIUM SERPL-MCNC: 9.2 MG/DL (ref 8.6–10.2)
CALCIUM SERPL-MCNC: 9.2 MG/DL (ref 8.6–10.2)
CALCIUM SERPL-MCNC: 9.3 MG/DL (ref 8.6–10.2)
CALCIUM SERPL-MCNC: 9.4 MG/DL (ref 8.6–10.2)
CALCIUM SERPL-MCNC: 9.4 MG/DL (ref 8.6–10.2)
CALCIUM SERPL-MCNC: 9.9 MG/DL (ref 8.6–10.2)
CARDIOPULMONARY BYPASS: NO
CARDIOPULMONARY BYPASS: NO
CARDIOPULMONARY BYPASS: YES
CHLORIDE BLD-SCNC: 100 MMOL/L (ref 98–107)
CHLORIDE BLD-SCNC: 100 MMOL/L (ref 98–107)
CHLORIDE BLD-SCNC: 101 MMOL/L (ref 98–107)
CHLORIDE BLD-SCNC: 103 MMOL/L (ref 98–107)
CHLORIDE BLD-SCNC: 105 MMOL/L (ref 98–107)
CHLORIDE BLD-SCNC: 105 MMOL/L (ref 98–107)
CHLORIDE BLD-SCNC: 106 MMOL/L (ref 98–107)
CHLORIDE BLD-SCNC: 106 MMOL/L (ref 98–107)
CHLORIDE BLD-SCNC: 107 MMOL/L (ref 98–107)
CHLORIDE BLD-SCNC: 108 MMOL/L (ref 98–107)
CHLORIDE BLD-SCNC: 84 MMOL/L (ref 98–107)
CHLORIDE BLD-SCNC: 88 MMOL/L (ref 98–107)
CHLORIDE BLD-SCNC: 91 MMOL/L (ref 98–107)
CHLORIDE BLD-SCNC: 93 MMOL/L (ref 98–107)
CHLORIDE BLD-SCNC: 96 MMOL/L (ref 98–107)
CHLORIDE BLD-SCNC: 97 MMOL/L (ref 98–107)
CHLORIDE BLD-SCNC: 97 MMOL/L (ref 98–107)
CHLORIDE BLD-SCNC: 98 MMOL/L (ref 98–107)
CHLORIDE BLD-SCNC: 99 MMOL/L (ref 98–107)
CHOLESTEROL, TOTAL: 192 MG/DL (ref 0–199)
CLARITY: CLEAR
CO2: 16 MMOL/L (ref 22–29)
CO2: 19 MMOL/L (ref 22–29)
CO2: 21 MMOL/L (ref 22–29)
CO2: 22 MMOL/L (ref 22–29)
CO2: 23 MMOL/L (ref 22–29)
CO2: 24 MMOL/L (ref 22–29)
CO2: 7 MMOL/L (ref 22–29)
COHB: 0.3 % (ref 0–1.5)
COHB: 0.3 % (ref 0–1.5)
COLOR: YELLOW
CREAT SERPL-MCNC: 0.3 MG/DL (ref 0.5–1)
CREAT SERPL-MCNC: 0.8 MG/DL (ref 0.5–1)
CREAT SERPL-MCNC: 0.9 MG/DL (ref 0.5–1)
CREAT SERPL-MCNC: 1 MG/DL (ref 0.5–1)
CREAT SERPL-MCNC: 1.1 MG/DL (ref 0.5–1)
CREAT SERPL-MCNC: 1.2 MG/DL (ref 0.5–1)
CREAT SERPL-MCNC: 1.3 MG/DL (ref 0.5–1)
CREAT SERPL-MCNC: 1.4 MG/DL (ref 0.5–1)
CRITICAL NOTIFICATION: YES
CRITICAL: ABNORMAL
CRITICAL: ABNORMAL
D DIMER: <200 NG/ML DDU
DATE ANALYZED: ABNORMAL
DATE ANALYZED: ABNORMAL
DATE OF COLLECTION: ABNORMAL
DATE OF COLLECTION: ABNORMAL
DELIVERY SYSTEMS: ABNORMAL
DESCRIPTION BLOOD BANK: NORMAL
DEVICE: ABNORMAL
EKG ATRIAL RATE: 77 BPM
EKG ATRIAL RATE: 78 BPM
EKG ATRIAL RATE: 81 BPM
EKG ATRIAL RATE: 84 BPM
EKG P AXIS: 65 DEGREES
EKG P AXIS: 72 DEGREES
EKG P AXIS: 72 DEGREES
EKG P AXIS: 82 DEGREES
EKG P-R INTERVAL: 146 MS
EKG P-R INTERVAL: 150 MS
EKG P-R INTERVAL: 158 MS
EKG P-R INTERVAL: 164 MS
EKG Q-T INTERVAL: 382 MS
EKG Q-T INTERVAL: 388 MS
EKG Q-T INTERVAL: 388 MS
EKG Q-T INTERVAL: 400 MS
EKG QRS DURATION: 66 MS
EKG QRS DURATION: 82 MS
EKG QRS DURATION: 82 MS
EKG QRS DURATION: 88 MS
EKG QTC CALCULATION (BAZETT): 439 MS
EKG QTC CALCULATION (BAZETT): 450 MS
EKG QTC CALCULATION (BAZETT): 451 MS
EKG QTC CALCULATION (BAZETT): 456 MS
EKG R AXIS: 10 DEGREES
EKG R AXIS: 34 DEGREES
EKG R AXIS: 39 DEGREES
EKG R AXIS: 56 DEGREES
EKG T AXIS: 21 DEGREES
EKG T AXIS: 23 DEGREES
EKG T AXIS: 30 DEGREES
EKG T AXIS: 52 DEGREES
EKG VENTRICULAR RATE: 77 BPM
EKG VENTRICULAR RATE: 78 BPM
EKG VENTRICULAR RATE: 81 BPM
EKG VENTRICULAR RATE: 84 BPM
EOSINOPHILS ABSOLUTE: 0.02 E9/L (ref 0.05–0.5)
EOSINOPHILS ABSOLUTE: 0.18 E9/L (ref 0.05–0.5)
EOSINOPHILS ABSOLUTE: 0.27 E9/L (ref 0.05–0.5)
EOSINOPHILS ABSOLUTE: 0.35 E9/L (ref 0.05–0.5)
EOSINOPHILS ABSOLUTE: 0.48 E9/L (ref 0.05–0.5)
EOSINOPHILS RELATIVE PERCENT: 0.1 % (ref 0–6)
EOSINOPHILS RELATIVE PERCENT: 2 % (ref 0–6)
EOSINOPHILS RELATIVE PERCENT: 3.6 % (ref 0–6)
EOSINOPHILS RELATIVE PERCENT: 4.1 % (ref 0–6)
EOSINOPHILS RELATIVE PERCENT: 6.1 % (ref 0–6)
EPL-TEG: 0 % (ref 0–15)
EPL-TEG: 0.7 % (ref 0–15)
FIO2 ARTERIAL: 100
FIO2 ARTERIAL: 40
FIO2 ARTERIAL: 50
FIO2 ARTERIAL: 50
FIO2 ARTERIAL: 60
FIO2 ARTERIAL: 65
G-TEG: 5.8 K D/SC (ref 4.5–11)
G-TEG: 6.1 K D/SC (ref 4.5–11)
G-TEG: 7.7 K D/SC (ref 4.5–11)
G-TEG: 8.2 K D/SC (ref 4.5–11)
G-TEG: 8.7 K D/SC (ref 4.5–11)
GFR AFRICAN AMERICAN: 44
GFR AFRICAN AMERICAN: 48
GFR AFRICAN AMERICAN: 53
GFR AFRICAN AMERICAN: 59
GFR AFRICAN AMERICAN: >60
GFR NON-AFRICAN AMERICAN: 37 ML/MIN/1.73
GFR NON-AFRICAN AMERICAN: 40 ML/MIN/1.73
GFR NON-AFRICAN AMERICAN: 44 ML/MIN/1.73
GFR NON-AFRICAN AMERICAN: 49 ML/MIN/1.73
GFR NON-AFRICAN AMERICAN: 54 ML/MIN/1.73
GFR NON-AFRICAN AMERICAN: >60 ML/MIN/1.73
GLUCOSE BLD-MCNC: 100 MG/DL (ref 74–99)
GLUCOSE BLD-MCNC: 100 MG/DL (ref 74–99)
GLUCOSE BLD-MCNC: 106 MG/DL (ref 74–99)
GLUCOSE BLD-MCNC: 114 MG/DL (ref 74–99)
GLUCOSE BLD-MCNC: 120 MG/DL (ref 74–99)
GLUCOSE BLD-MCNC: 121 MG/DL (ref 74–99)
GLUCOSE BLD-MCNC: 122 MG/DL (ref 74–99)
GLUCOSE BLD-MCNC: 126 MG/DL (ref 74–99)
GLUCOSE BLD-MCNC: 132 MG/DL (ref 74–99)
GLUCOSE BLD-MCNC: 133 MG/DL (ref 74–99)
GLUCOSE BLD-MCNC: 141 MG/DL (ref 74–99)
GLUCOSE BLD-MCNC: 174 MG/DL (ref 74–99)
GLUCOSE BLD-MCNC: 194 MG/DL (ref 74–99)
GLUCOSE BLD-MCNC: 199 MG/DL (ref 74–99)
GLUCOSE BLD-MCNC: 225 MG/DL (ref 74–99)
GLUCOSE BLD-MCNC: 226 MG/DL (ref 74–99)
GLUCOSE BLD-MCNC: 233 MG/DL (ref 74–99)
GLUCOSE BLD-MCNC: 258 MG/DL (ref 74–99)
GLUCOSE BLD-MCNC: 276 MG/DL (ref 74–99)
GLUCOSE BLD-MCNC: 94 MG/DL (ref 74–99)
GLUCOSE BLD-MCNC: 98 MG/DL (ref 74–99)
GLUCOSE FASTING: 76 MG/DL (ref 74–99)
GLUCOSE URINE: NEGATIVE MG/DL
HBA1C MFR BLD: 5.8 % (ref 4–5.6)
HCO3 ARTERIAL: 14.4 MMOL/L (ref 22–26)
HCO3 ARTERIAL: 15.6 MMOL/L (ref 22–26)
HCO3 ARTERIAL: 16.4 MMOL/L (ref 22–26)
HCO3 ARTERIAL: 16.7 MMOL/L (ref 22–26)
HCO3 ARTERIAL: 16.9 MMOL/L (ref 22–26)
HCO3 ARTERIAL: 17.3 MMOL/L (ref 22–26)
HCO3 ARTERIAL: 18.3 MMOL/L (ref 22–26)
HCO3 ARTERIAL: 18.7 MMOL/L (ref 22–26)
HCO3 ARTERIAL: 19.8 MMOL/L (ref 22–26)
HCO3 ARTERIAL: 20.7 MMOL/L (ref 22–26)
HCO3 ARTERIAL: 21.3 MMOL/L (ref 22–26)
HCO3 ARTERIAL: 21.6 MMOL/L (ref 22–26)
HCO3 ARTERIAL: 22.4 MMOL/L (ref 22–26)
HCO3 ARTERIAL: 22.4 MMOL/L (ref 22–26)
HCO3 ARTERIAL: 22.5 MMOL/L (ref 22–26)
HCO3 ARTERIAL: 22.9 MMOL/L (ref 22–26)
HCO3 ARTERIAL: 22.9 MMOL/L (ref 22–26)
HCO3 ARTERIAL: 23 MMOL/L (ref 22–26)
HCO3 ARTERIAL: 23 MMOL/L (ref 22–26)
HCO3 ARTERIAL: 23.3 MMOL/L (ref 22–26)
HCO3 ARTERIAL: 23.4 MMOL/L (ref 22–26)
HCO3 ARTERIAL: 23.4 MMOL/L (ref 22–26)
HCO3 ARTERIAL: 23.6 MMOL/L (ref 22–26)
HCO3 ARTERIAL: 23.7 MMOL/L (ref 22–26)
HCO3 ARTERIAL: 23.7 MMOL/L (ref 22–26)
HCO3 ARTERIAL: 23.8 MMOL/L (ref 22–26)
HCO3 ARTERIAL: 24 MMOL/L (ref 22–26)
HCO3 ARTERIAL: 24.1 MMOL/L (ref 22–26)
HCO3 ARTERIAL: 24.2 MMOL/L (ref 22–26)
HCO3 ARTERIAL: 24.3 MMOL/L (ref 22–26)
HCO3 ARTERIAL: 24.4 MMOL/L (ref 22–26)
HCO3 ARTERIAL: 24.5 MMOL/L (ref 22–26)
HCO3 ARTERIAL: 24.6 MMOL/L (ref 22–26)
HCO3 ARTERIAL: 24.8 MMOL/L (ref 22–26)
HCO3 ARTERIAL: 24.9 MMOL/L (ref 22–26)
HCO3 ARTERIAL: 25.3 MMOL/L (ref 22–26)
HCO3 ARTERIAL: 25.6 MMOL/L (ref 22–26)
HCO3 ARTERIAL: 25.7 MMOL/L (ref 22–26)
HCO3 ARTERIAL: 26.1 MMOL/L (ref 22–26)
HCO3 ARTERIAL: 26.5 MMOL/L (ref 22–26)
HCO3 ARTERIAL: 27 MMOL/L (ref 22–26)
HCO3 ARTERIAL: 27.2 MMOL/L (ref 22–26)
HCO3 ARTERIAL: 27.3 MMOL/L (ref 22–26)
HCO3 ARTERIAL: ABNORMAL MMOL/L (ref 22–26)
HCO3: 11.3 MMOL/L (ref 22–26)
HCO3: 23.5 MMOL/L (ref 22–26)
HCT (EST): 18 % (ref 34–48)
HCT (EST): 20 % (ref 34–48)
HCT (EST): 21 % (ref 34–48)
HCT (EST): 22 % (ref 34–48)
HCT (EST): 23 % (ref 34–48)
HCT (EST): 24 % (ref 34–48)
HCT (EST): 25 % (ref 34–48)
HCT (EST): 26 % (ref 34–48)
HCT (EST): 27 % (ref 34–48)
HCT (EST): 27 % (ref 34–48)
HCT (EST): 28 % (ref 34–48)
HCT (EST): 29 % (ref 34–48)
HCT (EST): 29 % (ref 34–48)
HCT (EST): 30 % (ref 34–48)
HCT (EST): 30 % (ref 34–48)
HCT (EST): 34 % (ref 34–48)
HCT (EST): 36 % (ref 34–48)
HCT VFR BLD CALC: 13.8 % (ref 34–48)
HCT VFR BLD CALC: 23.4 % (ref 34–48)
HCT VFR BLD CALC: 23.6 % (ref 34–48)
HCT VFR BLD CALC: 24.1 % (ref 34–48)
HCT VFR BLD CALC: 24.2 % (ref 34–48)
HCT VFR BLD CALC: 24.3 % (ref 34–48)
HCT VFR BLD CALC: 25.1 % (ref 34–48)
HCT VFR BLD CALC: 25.3 % (ref 34–48)
HCT VFR BLD CALC: 25.3 % (ref 34–48)
HCT VFR BLD CALC: 25.4 % (ref 34–48)
HCT VFR BLD CALC: 25.5 % (ref 34–48)
HCT VFR BLD CALC: 26 % (ref 34–48)
HCT VFR BLD CALC: 26.3 % (ref 34–48)
HCT VFR BLD CALC: 26.7 % (ref 34–48)
HCT VFR BLD CALC: 26.7 % (ref 34–48)
HCT VFR BLD CALC: 26.8 % (ref 34–48)
HCT VFR BLD CALC: 27 % (ref 34–48)
HCT VFR BLD CALC: 27.3 % (ref 34–48)
HCT VFR BLD CALC: 27.4 % (ref 34–48)
HCT VFR BLD CALC: 27.8 % (ref 34–48)
HCT VFR BLD CALC: 27.9 % (ref 34–48)
HCT VFR BLD CALC: 28.2 % (ref 34–48)
HCT VFR BLD CALC: 28.3 % (ref 34–48)
HCT VFR BLD CALC: 28.3 % (ref 34–48)
HCT VFR BLD CALC: 28.4 % (ref 34–48)
HCT VFR BLD CALC: 28.9 % (ref 34–48)
HCT VFR BLD CALC: 29.3 % (ref 34–48)
HCT VFR BLD CALC: 29.4 % (ref 34–48)
HCT VFR BLD CALC: 29.6 % (ref 34–48)
HCT VFR BLD CALC: 29.9 % (ref 34–48)
HCT VFR BLD CALC: 29.9 % (ref 34–48)
HCT VFR BLD CALC: 30.1 % (ref 34–48)
HCT VFR BLD CALC: 30.5 % (ref 34–48)
HCT VFR BLD CALC: 30.7 % (ref 34–48)
HCT VFR BLD CALC: 36.5 % (ref 34–48)
HCT VFR BLD CALC: 38.1 % (ref 34–48)
HCT VFR BLD CALC: 40.8 % (ref 34–48)
HCT VFR BLD CALC: 42.1 % (ref 34–48)
HCT VFR BLD CALC: 42.7 % (ref 34–48)
HCT VFR BLD CALC: 44.1 % (ref 34–48)
HCT VFR BLD CALC: 45.6 % (ref 34–48)
HCT VFR BLD CALC: 46.3 % (ref 34–48)
HDLC SERPL-MCNC: 49 MG/DL
HEMOGLOBIN: 10 G/DL (ref 11.5–15.5)
HEMOGLOBIN: 10.1 G/DL (ref 11.5–15.5)
HEMOGLOBIN: 10.2 G/DL (ref 11.5–15.5)
HEMOGLOBIN: 10.4 G/DL (ref 11.5–15.5)
HEMOGLOBIN: 10.4 G/DL (ref 11.5–15.5)
HEMOGLOBIN: 11.6 G/DL (ref 11.5–15.5)
HEMOGLOBIN: 12.5 G/DL (ref 11.5–15.5)
HEMOGLOBIN: 13.7 G/DL (ref 11.5–15.5)
HEMOGLOBIN: 14.1 G/DL (ref 11.5–15.5)
HEMOGLOBIN: 14.1 G/DL (ref 11.5–15.5)
HEMOGLOBIN: 14.4 G/DL (ref 11.5–15.5)
HEMOGLOBIN: 14.4 G/DL (ref 11.5–15.5)
HEMOGLOBIN: 15.4 G/DL (ref 11.5–15.5)
HEMOGLOBIN: 4.2 G/DL (ref 11.5–15.5)
HEMOGLOBIN: 7.8 G/DL (ref 11.5–15.5)
HEMOGLOBIN: 7.8 G/DL (ref 11.5–15.5)
HEMOGLOBIN: 7.9 G/DL (ref 11.5–15.5)
HEMOGLOBIN: 8.1 G/DL (ref 11.5–15.5)
HEMOGLOBIN: 8.3 G/DL (ref 11.5–15.5)
HEMOGLOBIN: 8.3 G/DL (ref 11.5–15.5)
HEMOGLOBIN: 8.4 G/DL (ref 11.5–15.5)
HEMOGLOBIN: 8.5 G/DL (ref 11.5–15.5)
HEMOGLOBIN: 8.6 G/DL (ref 11.5–15.5)
HEMOGLOBIN: 8.7 G/DL (ref 11.5–15.5)
HEMOGLOBIN: 8.9 G/DL (ref 11.5–15.5)
HEMOGLOBIN: 8.9 G/DL (ref 11.5–15.5)
HEMOGLOBIN: 9 G/DL (ref 11.5–15.5)
HEMOGLOBIN: 9.1 G/DL (ref 11.5–15.5)
HEMOGLOBIN: 9.2 G/DL (ref 11.5–15.5)
HEMOGLOBIN: 9.3 G/DL (ref 11.5–15.5)
HEMOGLOBIN: 9.3 G/DL (ref 11.5–15.5)
HEMOGLOBIN: 9.5 G/DL (ref 11.5–15.5)
HEMOGLOBIN: 9.6 G/DL (ref 11.5–15.5)
HEMOGLOBIN: 9.6 G/DL (ref 11.5–15.5)
HEMOGLOBIN: 9.8 G/DL (ref 11.5–15.5)
HEMOGLOBIN: 9.9 G/DL (ref 11.5–15.5)
HEMOGLOBIN: 9.9 G/DL (ref 11.5–15.5)
HGB, (EST): 10.3 G/DL (ref 11.5–15.5)
HGB, (EST): 10.3 G/DL (ref 11.5–15.5)
HGB, (EST): 11.4 G/DL (ref 11.5–15.5)
HGB, (EST): 12.3 G/DL (ref 11.5–15.5)
HGB, (EST): 6.2 G/DL (ref 11.5–15.5)
HGB, (EST): 6.7 G/DL (ref 11.5–15.5)
HGB, (EST): 7 G/DL (ref 11.5–15.5)
HGB, (EST): 7 G/DL (ref 11.5–15.5)
HGB, (EST): 7.1 G/DL (ref 11.5–15.5)
HGB, (EST): 7.2 G/DL (ref 11.5–15.5)
HGB, (EST): 7.3 G/DL (ref 11.5–15.5)
HGB, (EST): 7.3 G/DL (ref 11.5–15.5)
HGB, (EST): 7.4 G/DL (ref 11.5–15.5)
HGB, (EST): 7.4 G/DL (ref 11.5–15.5)
HGB, (EST): 7.5 G/DL (ref 11.5–15.5)
HGB, (EST): 7.5 G/DL (ref 11.5–15.5)
HGB, (EST): 7.6 G/DL (ref 11.5–15.5)
HGB, (EST): 7.9 G/DL (ref 11.5–15.5)
HGB, (EST): 8 G/DL (ref 11.5–15.5)
HGB, (EST): 8.1 G/DL (ref 11.5–15.5)
HGB, (EST): 8.1 G/DL (ref 11.5–15.5)
HGB, (EST): 8.2 G/DL (ref 11.5–15.5)
HGB, (EST): 8.2 G/DL (ref 11.5–15.5)
HGB, (EST): 8.3 G/DL (ref 11.5–15.5)
HGB, (EST): 8.4 G/DL (ref 11.5–15.5)
HGB, (EST): 8.6 G/DL (ref 11.5–15.5)
HGB, (EST): 8.7 G/DL (ref 11.5–15.5)
HGB, (EST): 8.7 G/DL (ref 11.5–15.5)
HGB, (EST): 8.8 G/DL (ref 11.5–15.5)
HGB, (EST): 8.9 G/DL (ref 11.5–15.5)
HGB, (EST): 8.9 G/DL (ref 11.5–15.5)
HGB, (EST): 9 G/DL (ref 11.5–15.5)
HGB, (EST): 9.2 G/DL (ref 11.5–15.5)
HGB, (EST): 9.2 G/DL (ref 11.5–15.5)
HGB, (EST): 9.4 G/DL (ref 11.5–15.5)
HGB, (EST): 9.4 G/DL (ref 11.5–15.5)
HGB, (EST): 9.5 G/DL (ref 11.5–15.5)
HGB, (EST): 9.6 G/DL (ref 11.5–15.5)
HGB, (EST): 9.7 G/DL (ref 11.5–15.5)
HGB, (EST): 9.9 G/DL (ref 11.5–15.5)
HHB: 17 % (ref 0–5)
HHB: 22.3 % (ref 0–5)
IMMATURE GRANULOCYTES #: 0.02 E9/L
IMMATURE GRANULOCYTES #: 0.03 E9/L
IMMATURE GRANULOCYTES #: 0.03 E9/L
IMMATURE GRANULOCYTES #: 0.04 E9/L
IMMATURE GRANULOCYTES #: 0.08 E9/L
IMMATURE GRANULOCYTES %: 0.3 % (ref 0–5)
IMMATURE GRANULOCYTES %: 0.3 % (ref 0–5)
IMMATURE GRANULOCYTES %: 0.4 % (ref 0–5)
IMMATURE GRANULOCYTES %: 0.5 % (ref 0–5)
IMMATURE GRANULOCYTES %: 0.5 % (ref 0–5)
INR BLD: 1
INR BLD: 1.2
INR BLD: 1.8
K (CLOTTING TIME): 1.9 MIN (ref 1–3)
K (CLOTTING TIME): 2.7 MIN (ref 1–3)
K (CLOTTING TIME): 2.7 MIN (ref 1–3)
K (CLOTTING TIME): 2.8 MIN (ref 1–3)
K (CLOTTING TIME): 3.4 MIN (ref 1–3)
KETONES, URINE: NEGATIVE MG/DL
LAB: ABNORMAL
LAB: ABNORMAL
LACTATE DEHYDROGENASE: 1012 U/L (ref 135–214)
LACTATE DEHYDROGENASE: 1031 U/L (ref 135–214)
LACTATE DEHYDROGENASE: 1128 U/L (ref 135–214)
LACTATE DEHYDROGENASE: 674 U/L (ref 135–214)
LACTATE DEHYDROGENASE: 959 U/L (ref 135–214)
LACTIC ACID: 1.2 MMOL/L (ref 0.5–2.2)
LACTIC ACID: 1.3 MMOL/L (ref 0.5–2.2)
LACTIC ACID: 1.3 MMOL/L (ref 0.5–2.2)
LACTIC ACID: 1.4 MMOL/L (ref 0.5–2.2)
LACTIC ACID: 1.5 MMOL/L (ref 0.5–2.2)
LACTIC ACID: 1.6 MMOL/L (ref 0.5–2.2)
LACTIC ACID: 1.7 MMOL/L (ref 0.5–2.2)
LACTIC ACID: 1.8 MMOL/L (ref 0.5–2.2)
LACTIC ACID: 1.9 MMOL/L (ref 0.5–2.2)
LACTIC ACID: 1.9 MMOL/L (ref 0.5–2.2)
LACTIC ACID: 17.2 MMOL/L (ref 0.5–2.2)
LACTIC ACID: 2 MMOL/L (ref 0.5–2.2)
LACTIC ACID: 2.1 MMOL/L (ref 0.5–2.2)
LACTIC ACID: 2.2 MMOL/L (ref 0.5–2.2)
LACTIC ACID: 2.3 MMOL/L (ref 0.5–2.2)
LACTIC ACID: 2.4 MMOL/L (ref 0.5–2.2)
LACTIC ACID: 2.5 MMOL/L (ref 0.5–2.2)
LACTIC ACID: 2.7 MMOL/L (ref 0.5–2.2)
LACTIC ACID: 3.7 MMOL/L (ref 0.5–2.2)
LACTIC ACID: 4.7 MMOL/L (ref 0.5–2.2)
LACTIC ACID: 7.4 MMOL/L (ref 0.5–2.2)
LACTIC ACID: 7.5 MMOL/L (ref 0.5–2.2)
LACTIC ACID: 8 MMOL/L (ref 0.5–2.2)
LACTIC ACID: 8.2 MMOL/L (ref 0.5–2.2)
LDL CHOLESTEROL CALCULATED: 106 MG/DL (ref 0–99)
LEUKOCYTE ESTERASE, URINE: ABNORMAL
LV EF: 65 %
LV EF: 75 %
LVEF MODALITY: NORMAL
LVEF MODALITY: NORMAL
LY30 (FIBRINOLYSIS): 0 % (ref 0–8)
LY30 (FIBRINOLYSIS): 0.7 % (ref 0–8)
LYMPHOCYTES ABSOLUTE: 1.16 E9/L (ref 1.5–4)
LYMPHOCYTES ABSOLUTE: 1.55 E9/L (ref 1.5–4)
LYMPHOCYTES ABSOLUTE: 2.03 E9/L (ref 1.5–4)
LYMPHOCYTES ABSOLUTE: 2.55 E9/L (ref 1.5–4)
LYMPHOCYTES ABSOLUTE: 2.68 E9/L (ref 1.5–4)
LYMPHOCYTES RELATIVE PERCENT: 12.2 % (ref 20–42)
LYMPHOCYTES RELATIVE PERCENT: 13.5 % (ref 20–42)
LYMPHOCYTES RELATIVE PERCENT: 19.6 % (ref 20–42)
LYMPHOCYTES RELATIVE PERCENT: 29.8 % (ref 20–42)
LYMPHOCYTES RELATIVE PERCENT: 33.9 % (ref 20–42)
Lab: ABNORMAL
Lab: ABNORMAL
MA (MAX AMPLITUDE): 53.9 MM (ref 50–70)
MA (MAX AMPLITUDE): 55.1 MM (ref 50–70)
MA (MAX AMPLITUDE): 60.6 MM (ref 50–70)
MA (MAX AMPLITUDE): 62.1 MM (ref 50–70)
MA (MAX AMPLITUDE): 63.6 MM (ref 50–70)
MAGNESIUM: 1.3 MG/DL (ref 1.6–2.6)
MAGNESIUM: 1.8 MG/DL (ref 1.6–2.6)
MAGNESIUM: 1.9 MG/DL (ref 1.6–2.6)
MAGNESIUM: 2.1 MG/DL (ref 1.6–2.6)
MAGNESIUM: 2.2 MG/DL (ref 1.6–2.6)
MAGNESIUM: 2.2 MG/DL (ref 1.6–2.6)
MAGNESIUM: 2.4 MG/DL (ref 1.6–2.6)
MAGNESIUM: 2.5 MG/DL (ref 1.6–2.6)
MAGNESIUM: 2.5 MG/DL (ref 1.6–2.6)
MCH RBC QN AUTO: 30.3 PG (ref 26–35)
MCH RBC QN AUTO: 30.4 PG (ref 26–35)
MCH RBC QN AUTO: 30.5 PG (ref 26–35)
MCH RBC QN AUTO: 30.7 PG (ref 26–35)
MCH RBC QN AUTO: 30.8 PG (ref 26–35)
MCH RBC QN AUTO: 31 PG (ref 26–35)
MCH RBC QN AUTO: 31 PG (ref 26–35)
MCH RBC QN AUTO: 31.2 PG (ref 26–35)
MCH RBC QN AUTO: 31.3 PG (ref 26–35)
MCH RBC QN AUTO: 31.6 PG (ref 26–35)
MCHC RBC AUTO-ENTMCNC: 30.4 % (ref 32–34.5)
MCHC RBC AUTO-ENTMCNC: 30.9 % (ref 32–34.5)
MCHC RBC AUTO-ENTMCNC: 31.8 % (ref 32–34.5)
MCHC RBC AUTO-ENTMCNC: 32.7 % (ref 32–34.5)
MCHC RBC AUTO-ENTMCNC: 32.7 % (ref 32–34.5)
MCHC RBC AUTO-ENTMCNC: 32.8 % (ref 32–34.5)
MCHC RBC AUTO-ENTMCNC: 32.8 % (ref 32–34.5)
MCHC RBC AUTO-ENTMCNC: 33 % (ref 32–34.5)
MCHC RBC AUTO-ENTMCNC: 33.1 % (ref 32–34.5)
MCHC RBC AUTO-ENTMCNC: 33.1 % (ref 32–34.5)
MCHC RBC AUTO-ENTMCNC: 33.2 % (ref 32–34.5)
MCHC RBC AUTO-ENTMCNC: 33.3 % (ref 32–34.5)
MCHC RBC AUTO-ENTMCNC: 33.3 % (ref 32–34.5)
MCHC RBC AUTO-ENTMCNC: 33.6 % (ref 32–34.5)
MCHC RBC AUTO-ENTMCNC: 34.1 % (ref 32–34.5)
MCHC RBC AUTO-ENTMCNC: 34.2 % (ref 32–34.5)
MCHC RBC AUTO-ENTMCNC: 34.6 % (ref 32–34.5)
MCHC RBC AUTO-ENTMCNC: 34.6 % (ref 32–34.5)
MCHC RBC AUTO-ENTMCNC: 34.7 % (ref 32–34.5)
MCHC RBC AUTO-ENTMCNC: 34.8 % (ref 32–34.5)
MCHC RBC AUTO-ENTMCNC: 35.4 % (ref 32–34.5)
MCHC RBC AUTO-ENTMCNC: 35.6 % (ref 32–34.5)
MCV RBC AUTO: 103 FL (ref 80–99.9)
MCV RBC AUTO: 86.6 FL (ref 80–99.9)
MCV RBC AUTO: 87 FL (ref 80–99.9)
MCV RBC AUTO: 87.2 FL (ref 80–99.9)
MCV RBC AUTO: 88.6 FL (ref 80–99.9)
MCV RBC AUTO: 89.3 FL (ref 80–99.9)
MCV RBC AUTO: 89.6 FL (ref 80–99.9)
MCV RBC AUTO: 91.2 FL (ref 80–99.9)
MCV RBC AUTO: 91.7 FL (ref 80–99.9)
MCV RBC AUTO: 92.3 FL (ref 80–99.9)
MCV RBC AUTO: 92.5 FL (ref 80–99.9)
MCV RBC AUTO: 92.5 FL (ref 80–99.9)
MCV RBC AUTO: 92.7 FL (ref 80–99.9)
MCV RBC AUTO: 93.7 FL (ref 80–99.9)
MCV RBC AUTO: 94.1 FL (ref 80–99.9)
MCV RBC AUTO: 94.2 FL (ref 80–99.9)
MCV RBC AUTO: 94.2 FL (ref 80–99.9)
MCV RBC AUTO: 94.9 FL (ref 80–99.9)
MCV RBC AUTO: 95.2 FL (ref 80–99.9)
MCV RBC AUTO: 96.2 FL (ref 80–99.9)
MCV RBC AUTO: 96.8 FL (ref 80–99.9)
MCV RBC AUTO: 98.1 FL (ref 80–99.9)
METER GLUCOSE: 100 MG/DL (ref 74–99)
METER GLUCOSE: 101 MG/DL (ref 74–99)
METER GLUCOSE: 108 MG/DL (ref 74–99)
METER GLUCOSE: 111 MG/DL (ref 74–99)
METER GLUCOSE: 117 MG/DL (ref 74–99)
METER GLUCOSE: 121 MG/DL (ref 74–99)
METER GLUCOSE: 121 MG/DL (ref 74–99)
METER GLUCOSE: 122 MG/DL (ref 74–99)
METER GLUCOSE: 123 MG/DL (ref 74–99)
METER GLUCOSE: 124 MG/DL (ref 74–99)
METER GLUCOSE: 126 MG/DL (ref 74–99)
METER GLUCOSE: 129 MG/DL (ref 74–99)
METER GLUCOSE: 129 MG/DL (ref 74–99)
METER GLUCOSE: 130 MG/DL (ref 74–99)
METER GLUCOSE: 131 MG/DL (ref 74–99)
METER GLUCOSE: 134 MG/DL (ref 74–99)
METER GLUCOSE: 140 MG/DL (ref 74–99)
METER GLUCOSE: 142 MG/DL (ref 74–99)
METER GLUCOSE: 144 MG/DL (ref 74–99)
METER GLUCOSE: 145 MG/DL (ref 74–99)
METER GLUCOSE: 148 MG/DL (ref 74–99)
METER GLUCOSE: 149 MG/DL (ref 74–99)
METER GLUCOSE: 151 MG/DL (ref 74–99)
METER GLUCOSE: 151 MG/DL (ref 74–99)
METER GLUCOSE: 152 MG/DL (ref 74–99)
METER GLUCOSE: 154 MG/DL (ref 74–99)
METER GLUCOSE: 160 MG/DL (ref 74–99)
METER GLUCOSE: 160 MG/DL (ref 74–99)
METER GLUCOSE: 162 MG/DL (ref 74–99)
METER GLUCOSE: 164 MG/DL (ref 74–99)
METER GLUCOSE: 165 MG/DL (ref 74–99)
METER GLUCOSE: 170 MG/DL (ref 74–99)
METER GLUCOSE: 171 MG/DL (ref 74–99)
METER GLUCOSE: 173 MG/DL (ref 74–99)
METER GLUCOSE: 174 MG/DL (ref 74–99)
METER GLUCOSE: 178 MG/DL (ref 74–99)
METER GLUCOSE: 180 MG/DL (ref 74–99)
METER GLUCOSE: 182 MG/DL (ref 74–99)
METER GLUCOSE: 183 MG/DL (ref 74–99)
METER GLUCOSE: 185 MG/DL (ref 74–99)
METER GLUCOSE: 188 MG/DL (ref 74–99)
METER GLUCOSE: 195 MG/DL (ref 74–99)
METER GLUCOSE: 196 MG/DL (ref 74–99)
METER GLUCOSE: 198 MG/DL (ref 74–99)
METER GLUCOSE: 204 MG/DL (ref 74–99)
METER GLUCOSE: 205 MG/DL (ref 74–99)
METER GLUCOSE: 206 MG/DL (ref 74–99)
METER GLUCOSE: 207 MG/DL (ref 74–99)
METER GLUCOSE: 208 MG/DL (ref 74–99)
METER GLUCOSE: 208 MG/DL (ref 74–99)
METER GLUCOSE: 210 MG/DL (ref 74–99)
METER GLUCOSE: 211 MG/DL (ref 74–99)
METER GLUCOSE: 214 MG/DL (ref 74–99)
METER GLUCOSE: 220 MG/DL (ref 74–99)
METER GLUCOSE: 223 MG/DL (ref 74–99)
METER GLUCOSE: 228 MG/DL (ref 74–99)
METER GLUCOSE: 236 MG/DL (ref 74–99)
METER GLUCOSE: 239 MG/DL (ref 74–99)
METER GLUCOSE: 239 MG/DL (ref 74–99)
METER GLUCOSE: 243 MG/DL (ref 74–99)
METER GLUCOSE: 247 MG/DL (ref 74–99)
METER GLUCOSE: 255 MG/DL (ref 74–99)
METER GLUCOSE: 264 MG/DL (ref 74–99)
METER GLUCOSE: 280 MG/DL (ref 74–99)
METER GLUCOSE: 297 MG/DL (ref 74–99)
METER GLUCOSE: 297 MG/DL (ref 74–99)
METER GLUCOSE: 83 MG/DL (ref 74–99)
METER GLUCOSE: 96 MG/DL (ref 74–99)
METHB: 0.2 % (ref 0–1.5)
METHB: 0.2 % (ref 0–1.5)
MODE: ABNORMAL
MODE: AC
MONOCYTES ABSOLUTE: 0.65 E9/L (ref 0.1–0.95)
MONOCYTES ABSOLUTE: 0.69 E9/L (ref 0.1–0.95)
MONOCYTES ABSOLUTE: 0.72 E9/L (ref 0.1–0.95)
MONOCYTES ABSOLUTE: 0.82 E9/L (ref 0.1–0.95)
MONOCYTES ABSOLUTE: 1.01 E9/L (ref 0.1–0.95)
MONOCYTES RELATIVE PERCENT: 6.1 % (ref 2–12)
MONOCYTES RELATIVE PERCENT: 8 % (ref 2–12)
MONOCYTES RELATIVE PERCENT: 8.6 % (ref 2–12)
MONOCYTES RELATIVE PERCENT: 9.1 % (ref 2–12)
MONOCYTES RELATIVE PERCENT: 9.1 % (ref 2–12)
NEUTROPHILS ABSOLUTE: 13.48 E9/L (ref 1.8–7.3)
NEUTROPHILS ABSOLUTE: 3.98 E9/L (ref 1.8–7.3)
NEUTROPHILS ABSOLUTE: 5.03 E9/L (ref 1.8–7.3)
NEUTROPHILS ABSOLUTE: 5.21 E9/L (ref 1.8–7.3)
NEUTROPHILS ABSOLUTE: 6.28 E9/L (ref 1.8–7.3)
NEUTROPHILS RELATIVE PERCENT: 52.9 % (ref 43–80)
NEUTROPHILS RELATIVE PERCENT: 58 % (ref 43–80)
NEUTROPHILS RELATIVE PERCENT: 63.8 % (ref 43–80)
NEUTROPHILS RELATIVE PERCENT: 73.2 % (ref 43–80)
NEUTROPHILS RELATIVE PERCENT: 80.9 % (ref 43–80)
NITRITE, URINE: NEGATIVE
O2 CONTENT: 10.4 ML/DL
O2 CONTENT: 8.6 ML/DL
O2 SATURATION: 100 % (ref 92–98.5)
O2 SATURATION: 45.3 % (ref 92–98.5)
O2 SATURATION: 47.4 % (ref 92–98.5)
O2 SATURATION: 47.9 % (ref 92–98.5)
O2 SATURATION: 50.9 % (ref 92–98.5)
O2 SATURATION: 51.2 % (ref 92–98.5)
O2 SATURATION: 52.2 % (ref 92–98.5)
O2 SATURATION: 57.8 % (ref 92–98.5)
O2 SATURATION: 58.4 % (ref 92–98.5)
O2 SATURATION: 62.5 % (ref 92–98.5)
O2 SATURATION: 64.9 % (ref 92–98.5)
O2 SATURATION: 66.7 % (ref 92–98.5)
O2 SATURATION: 69.8 % (ref 92–98.5)
O2 SATURATION: 74.2 % (ref 92–98.5)
O2 SATURATION: 77.3 % (ref 92–98.5)
O2 SATURATION: 77.6 % (ref 92–98.5)
O2 SATURATION: 77.6 % (ref 92–98.5)
O2 SATURATION: 81.7 % (ref 92–98.5)
O2 SATURATION: 82.9 % (ref 92–98.5)
O2 SATURATION: 82.9 % (ref 92–98.5)
O2 SATURATION: 84.6 % (ref 92–98.5)
O2 SATURATION: 86.1 % (ref 92–98.5)
O2 SATURATION: 86.4 % (ref 92–98.5)
O2 SATURATION: 89.5 % (ref 92–98.5)
O2 SATURATION: 89.9 % (ref 92–98.5)
O2 SATURATION: 90.3 % (ref 92–98.5)
O2 SATURATION: 90.9 % (ref 92–98.5)
O2 SATURATION: 92.2 % (ref 92–98.5)
O2 SATURATION: 92.9 % (ref 92–98.5)
O2 SATURATION: 93 % (ref 92–98.5)
O2 SATURATION: 93.5 % (ref 92–98.5)
O2 SATURATION: 94.6 % (ref 92–98.5)
O2 SATURATION: 95.3 % (ref 92–98.5)
O2 SATURATION: 96 % (ref 92–98.5)
O2 SATURATION: 96.5 % (ref 92–98.5)
O2 SATURATION: 96.6 % (ref 92–98.5)
O2 SATURATION: 96.7 % (ref 92–98.5)
O2 SATURATION: 96.9 % (ref 92–98.5)
O2 SATURATION: 98.6 % (ref 92–98.5)
O2 SATURATION: 99 % (ref 92–98.5)
O2 SATURATION: 99.2 % (ref 92–98.5)
O2 SATURATION: 99.2 % (ref 92–98.5)
O2 SATURATION: 99.5 % (ref 92–98.5)
O2 SATURATION: 99.6 % (ref 92–98.5)
O2 SATURATION: 99.9 % (ref 92–98.5)
O2 SATURATION: 99.9 % (ref 92–98.5)
O2 SATURATION: ABNORMAL % (ref 92–98.5)
O2 SATURATION: ABNORMAL % (ref 92–98.5)
O2HB: 77.2 % (ref 94–97)
O2HB: 82.5 % (ref 94–97)
OPERATOR ID: 1556
OPERATOR ID: 1632
OPERATOR ID: 1868
OPERATOR ID: 187
OPERATOR ID: 1874
OPERATOR ID: 1926
OPERATOR ID: 1926
OPERATOR ID: 3057
OPERATOR ID: 410
OPERATOR ID: 410
OPERATOR ID: 467
OPERATOR ID: 467
OPERATOR ID: 475
OPERATOR ID: ABNORMAL
PATIENT TEMP: 37
PATIENT TEMP: 37 C
PATIENT TEMP: 37 C
PCO2 (TEMP CORRECTED): 31.5 MMHG (ref 35–45)
PCO2 (TEMP CORRECTED): 37 MMHG (ref 35–45)
PCO2 (TEMP CORRECTED): 37.8 MMHG (ref 35–45)
PCO2 (TEMP CORRECTED): 38.7 MMHG (ref 35–45)
PCO2 (TEMP CORRECTED): 39 MMHG (ref 35–45)
PCO2 (TEMP CORRECTED): 39.9 MMHG (ref 35–45)
PCO2 (TEMP CORRECTED): 40.2 MMHG (ref 35–45)
PCO2 (TEMP CORRECTED): 41.5 MMHG (ref 35–45)
PCO2 (TEMP CORRECTED): 41.8 MMHG (ref 35–45)
PCO2 (TEMP CORRECTED): 43.2 MMHG (ref 35–45)
PCO2 (TEMP CORRECTED): 43.3 MMHG (ref 35–45)
PCO2 (TEMP CORRECTED): 43.8 MMHG (ref 35–45)
PCO2 (TEMP CORRECTED): 44.2 MMHG (ref 35–45)
PCO2 (TEMP CORRECTED): 44.4 MMHG (ref 35–45)
PCO2 (TEMP CORRECTED): 45.1 MMHG (ref 35–45)
PCO2 (TEMP CORRECTED): 45.4 MMHG (ref 35–45)
PCO2 (TEMP CORRECTED): 53.6 MMHG (ref 35–45)
PCO2 (TEMP CORRECTED): 57.8 MMHG (ref 35–45)
PCO2 (TEMP CORRECTED): 66.3 MMHG (ref 35–45)
PCO2 ARTERIAL: 19.4 MMHG (ref 35–45)
PCO2 ARTERIAL: 20.2 MMHG (ref 35–45)
PCO2 ARTERIAL: 24.6 MMHG (ref 35–45)
PCO2 ARTERIAL: 26.1 MMHG (ref 35–45)
PCO2 ARTERIAL: 26.9 MMHG (ref 35–45)
PCO2 ARTERIAL: 26.9 MMHG (ref 35–45)
PCO2 ARTERIAL: 27.3 MMHG (ref 35–45)
PCO2 ARTERIAL: 32.5 MMHG (ref 35–45)
PCO2 ARTERIAL: 32.7 MMHG (ref 35–45)
PCO2 ARTERIAL: 33.8 MMHG (ref 35–45)
PCO2 ARTERIAL: 35.8 MMHG (ref 35–45)
PCO2 ARTERIAL: 36.1 MMHG (ref 35–45)
PCO2 ARTERIAL: 36.3 MMHG (ref 35–45)
PCO2 ARTERIAL: 37.4 MMHG (ref 35–45)
PCO2 ARTERIAL: 37.6 MMHG (ref 35–45)
PCO2 ARTERIAL: 37.6 MMHG (ref 35–45)
PCO2 ARTERIAL: 38.1 MMHG (ref 35–45)
PCO2 ARTERIAL: 38.7 MMHG (ref 35–45)
PCO2 ARTERIAL: 39.8 MMHG (ref 35–45)
PCO2 ARTERIAL: 43 MMHG (ref 35–45)
PCO2 ARTERIAL: 43.1 MMHG (ref 35–45)
PCO2 ARTERIAL: 46 MMHG (ref 35–45)
PCO2 ARTERIAL: 47.8 MMHG (ref 35–45)
PCO2 ARTERIAL: 50 MMHG (ref 35–45)
PCO2 ARTERIAL: 55.4 MMHG (ref 35–45)
PCO2 ARTERIAL: 56 MMHG (ref 35–45)
PCO2 ARTERIAL: 57.5 MMHG (ref 35–45)
PCO2 ARTERIAL: 59.7 MMHG (ref 35–45)
PCO2 ARTERIAL: 62.3 MMHG (ref 35–45)
PCO2 ARTERIAL: 62.9 MMHG (ref 35–45)
PCO2 ARTERIAL: 71.5 MMHG (ref 35–45)
PCO2 ARTERIAL: <12 MMHG (ref 35–45)
PCO2: 49.4 MMHG (ref 35–45)
PCO2: 49.9 MMHG (ref 35–45)
PDW BLD-RTO: 12.9 FL (ref 11.5–15)
PDW BLD-RTO: 13 FL (ref 11.5–15)
PDW BLD-RTO: 13 FL (ref 11.5–15)
PDW BLD-RTO: 13.1 FL (ref 11.5–15)
PDW BLD-RTO: 13.3 FL (ref 11.5–15)
PDW BLD-RTO: 13.3 FL (ref 11.5–15)
PDW BLD-RTO: 13.7 FL (ref 11.5–15)
PDW BLD-RTO: 14 FL (ref 11.5–15)
PDW BLD-RTO: 14.1 FL (ref 11.5–15)
PDW BLD-RTO: 14.2 FL (ref 11.5–15)
PDW BLD-RTO: 14.5 FL (ref 11.5–15)
PDW BLD-RTO: 14.6 FL (ref 11.5–15)
PDW BLD-RTO: 14.6 FL (ref 11.5–15)
PDW BLD-RTO: 14.7 FL (ref 11.5–15)
PDW BLD-RTO: 14.9 FL (ref 11.5–15)
PDW BLD-RTO: 15.2 FL (ref 11.5–15)
PDW BLD-RTO: 15.3 FL (ref 11.5–15)
PDW BLD-RTO: 15.5 FL (ref 11.5–15)
PDW BLD-RTO: 15.6 FL (ref 11.5–15)
PDW BLD-RTO: 15.6 FL (ref 11.5–15)
PDW BLD-RTO: 15.7 FL (ref 11.5–15)
PDW BLD-RTO: 15.8 FL (ref 11.5–15)
PH (TEMPERATURE CORRECTED): 7.2 (ref 7.35–7.45)
PH (TEMPERATURE CORRECTED): 7.27 (ref 7.35–7.45)
PH (TEMPERATURE CORRECTED): 7.29 (ref 7.35–7.45)
PH (TEMPERATURE CORRECTED): 7.32 (ref 7.35–7.45)
PH (TEMPERATURE CORRECTED): 7.34 (ref 7.35–7.45)
PH (TEMPERATURE CORRECTED): 7.35 (ref 7.35–7.45)
PH (TEMPERATURE CORRECTED): 7.36 (ref 7.35–7.45)
PH (TEMPERATURE CORRECTED): 7.37 (ref 7.35–7.45)
PH (TEMPERATURE CORRECTED): 7.38 (ref 7.35–7.45)
PH (TEMPERATURE CORRECTED): 7.4 (ref 7.35–7.45)
PH (TEMPERATURE CORRECTED): 7.4 (ref 7.35–7.45)
PH (TEMPERATURE CORRECTED): 7.41 (ref 7.35–7.45)
PH BLOOD GAS: 6.98 (ref 7.35–7.45)
PH BLOOD GAS: 7.16 (ref 7.35–7.45)
PH BLOOD GAS: 7.19 (ref 7.35–7.45)
PH BLOOD GAS: 7.21 (ref 7.35–7.45)
PH BLOOD GAS: 7.22 (ref 7.35–7.45)
PH BLOOD GAS: 7.24 (ref 7.35–7.45)
PH BLOOD GAS: 7.25 (ref 7.35–7.45)
PH BLOOD GAS: 7.27 (ref 7.35–7.45)
PH BLOOD GAS: 7.27 (ref 7.35–7.45)
PH BLOOD GAS: 7.28 (ref 7.35–7.45)
PH BLOOD GAS: 7.28 (ref 7.35–7.45)
PH BLOOD GAS: 7.29 (ref 7.35–7.45)
PH BLOOD GAS: 7.31 (ref 7.35–7.45)
PH BLOOD GAS: 7.32 (ref 7.35–7.45)
PH BLOOD GAS: 7.33 (ref 7.35–7.45)
PH BLOOD GAS: 7.33 (ref 7.35–7.45)
PH BLOOD GAS: 7.34 (ref 7.35–7.45)
PH BLOOD GAS: 7.37 (ref 7.35–7.45)
PH BLOOD GAS: 7.37 (ref 7.35–7.45)
PH BLOOD GAS: 7.38 (ref 7.35–7.45)
PH BLOOD GAS: 7.41 (ref 7.35–7.45)
PH BLOOD GAS: 7.42 (ref 7.35–7.45)
PH BLOOD GAS: 7.42 (ref 7.35–7.45)
PH BLOOD GAS: 7.43 (ref 7.35–7.45)
PH BLOOD GAS: 7.45 (ref 7.35–7.45)
PH BLOOD GAS: 7.45 (ref 7.35–7.45)
PH BLOOD GAS: 7.51 (ref 7.35–7.45)
PH BLOOD GAS: 7.51 (ref 7.35–7.45)
PH BLOOD GAS: 7.53 (ref 7.35–7.45)
PH BLOOD GAS: 7.55 (ref 7.35–7.45)
PH BLOOD GAS: 7.55 (ref 7.35–7.45)
PH BLOOD GAS: 7.83 (ref 7.35–7.45)
PH UA: 6 (ref 5–9)
PHOSPHORUS: 1.9 MG/DL (ref 2.5–4.5)
PHOSPHORUS: 2.8 MG/DL (ref 2.5–4.5)
PHOSPHORUS: 2.8 MG/DL (ref 2.5–4.5)
PHOSPHORUS: 2.9 MG/DL (ref 2.5–4.5)
PHOSPHORUS: 3.6 MG/DL (ref 2.5–4.5)
PHOSPHORUS: 3.8 MG/DL (ref 2.5–4.5)
PLATELET # BLD: 129 E9/L (ref 130–450)
PLATELET # BLD: 228 E9/L (ref 130–450)
PLATELET # BLD: 238 E9/L (ref 130–450)
PLATELET # BLD: 240 E9/L (ref 130–450)
PLATELET # BLD: 257 E9/L (ref 130–450)
PLATELET # BLD: 257 E9/L (ref 130–450)
PLATELET # BLD: 273 E9/L (ref 130–450)
PLATELET # BLD: 273 E9/L (ref 130–450)
PLATELET # BLD: 34 E9/L (ref 130–450)
PLATELET # BLD: 398 E9/L (ref 130–450)
PLATELET # BLD: 52 E9/L (ref 130–450)
PLATELET # BLD: 54 E9/L (ref 130–450)
PLATELET # BLD: 55 E9/L (ref 130–450)
PLATELET # BLD: 62 E9/L (ref 130–450)
PLATELET # BLD: 69 E9/L (ref 130–450)
PLATELET # BLD: 69 E9/L (ref 130–450)
PLATELET # BLD: 76 E9/L (ref 130–450)
PLATELET # BLD: 80 E9/L (ref 130–450)
PLATELET # BLD: 87 E9/L (ref 130–450)
PLATELET # BLD: 88 E9/L (ref 130–450)
PLATELET # BLD: 94 E9/L (ref 130–450)
PLATELET # BLD: 97 E9/L (ref 130–450)
PLATELET # BLD: 98 E9/L (ref 130–450)
PLATELET CONFIRMATION: NORMAL
PMV BLD AUTO: 10 FL (ref 7–12)
PMV BLD AUTO: 10.2 FL (ref 7–12)
PMV BLD AUTO: 10.5 FL (ref 7–12)
PMV BLD AUTO: 10.6 FL (ref 7–12)
PMV BLD AUTO: 10.6 FL (ref 7–12)
PMV BLD AUTO: 10.7 FL (ref 7–12)
PMV BLD AUTO: 10.8 FL (ref 7–12)
PMV BLD AUTO: 10.9 FL (ref 7–12)
PMV BLD AUTO: 11 FL (ref 7–12)
PMV BLD AUTO: 11.1 FL (ref 7–12)
PMV BLD AUTO: 11.3 FL (ref 7–12)
PMV BLD AUTO: 11.6 FL (ref 7–12)
PMV BLD AUTO: 11.8 FL (ref 7–12)
PMV BLD AUTO: 11.8 FL (ref 7–12)
PMV BLD AUTO: 9.5 FL (ref 7–12)
PMV BLD AUTO: 9.7 FL (ref 7–12)
PMV BLD AUTO: 9.7 FL (ref 7–12)
PMV BLD AUTO: 9.8 FL (ref 7–12)
PMV BLD AUTO: 9.9 FL (ref 7–12)
PO2 (TEMP CORRECTED): 186.5 MMHG (ref 60–80)
PO2 (TEMP CORRECTED): 25.8 MMHG (ref 60–80)
PO2 (TEMP CORRECTED): 27.8 MMHG (ref 60–80)
PO2 (TEMP CORRECTED): 29.2 MMHG (ref 60–80)
PO2 (TEMP CORRECTED): 29.7 MMHG (ref 60–80)
PO2 (TEMP CORRECTED): 32 MMHG (ref 60–80)
PO2 (TEMP CORRECTED): 32 MMHG (ref 60–80)
PO2 (TEMP CORRECTED): 33.9 MMHG (ref 60–80)
PO2 (TEMP CORRECTED): 35.9 MMHG (ref 60–80)
PO2 (TEMP CORRECTED): 45.8 MMHG (ref 60–80)
PO2 (TEMP CORRECTED): 46.4 MMHG (ref 60–80)
PO2 (TEMP CORRECTED): 61.2 MMHG (ref 60–80)
PO2 (TEMP CORRECTED): 63.4 MMHG (ref 60–80)
PO2 (TEMP CORRECTED): 64.4 MMHG (ref 60–80)
PO2 (TEMP CORRECTED): 65.5 MMHG (ref 60–80)
PO2 (TEMP CORRECTED): 68 MMHG (ref 60–80)
PO2 (TEMP CORRECTED): 75.4 MMHG (ref 60–80)
PO2 (TEMP CORRECTED): 81.8 MMHG (ref 60–80)
PO2 (TEMP CORRECTED): 85.5 MMHG (ref 60–80)
PO2 ARTERIAL: 110.6 MMHG (ref 60–80)
PO2 ARTERIAL: 118.2 MMHG (ref 60–80)
PO2 ARTERIAL: 123.5 MMHG (ref 60–80)
PO2 ARTERIAL: 132.7 MMHG (ref 60–80)
PO2 ARTERIAL: 139.1 MMHG (ref 60–80)
PO2 ARTERIAL: 145.9 MMHG (ref 60–80)
PO2 ARTERIAL: 178.3 MMHG (ref 60–80)
PO2 ARTERIAL: 181.5 MMHG (ref 60–80)
PO2 ARTERIAL: 27.9 MMHG (ref 60–80)
PO2 ARTERIAL: 298.5 MMHG (ref 60–80)
PO2 ARTERIAL: 30.6 MMHG (ref 60–80)
PO2 ARTERIAL: 308.4 MMHG (ref 60–80)
PO2 ARTERIAL: 39.1 MMHG (ref 60–80)
PO2 ARTERIAL: 41.3 MMHG (ref 60–80)
PO2 ARTERIAL: 423.5 MMHG (ref 60–80)
PO2 ARTERIAL: 499.4 MMHG (ref 60–80)
PO2 ARTERIAL: 518.2 MMHG (ref 60–80)
PO2 ARTERIAL: 526.5 MMHG (ref 60–80)
PO2 ARTERIAL: 53.7 MMHG (ref 60–80)
PO2 ARTERIAL: 54.8 MMHG (ref 60–80)
PO2 ARTERIAL: 56.8 MMHG (ref 60–80)
PO2 ARTERIAL: 58.7 MMHG (ref 60–80)
PO2 ARTERIAL: 59.3 MMHG (ref 60–80)
PO2 ARTERIAL: 64 MMHG (ref 60–80)
PO2 ARTERIAL: 64.4 MMHG (ref 60–80)
PO2 ARTERIAL: 73.1 MMHG (ref 60–80)
PO2 ARTERIAL: 76.1 MMHG (ref 60–80)
PO2 ARTERIAL: 76.6 MMHG (ref 60–80)
PO2 ARTERIAL: 85.2 MMHG (ref 60–80)
PO2 ARTERIAL: 86.2 MMHG (ref 60–80)
PO2 ARTERIAL: 91 MMHG (ref 60–80)
PO2 ARTERIAL: >600 MMHG (ref 60–80)
PO2: 57.7 MMHG (ref 75–100)
PO2: 61.6 MMHG (ref 75–100)
POC ACT LR: 151 SECONDS
POC ACT LR: 153 SECONDS
POC ACT LR: 157 SECONDS
POC ACT LR: 159 SECONDS
POC ACT LR: 162 SECONDS
POC ACT LR: 167 SECONDS
POC ACT LR: 174 SECONDS
POC ACT LR: 179 SECONDS
POC ACT LR: 182 SECONDS
POC ACT LR: 193 SECONDS
POC ACT LR: 197 SECONDS
POC ACT LR: 200 SECONDS
POC ACT LR: 201 SECONDS
POC ACT LR: 207 SECONDS
POC ACT LR: 210 SECONDS
POC ACT LR: 212 SECONDS
POC ACT LR: 217 SECONDS
POC ACT LR: 227 SECONDS
POC ACT LR: 232 SECONDS
POC ACT LR: 234 SECONDS
POC ACT LR: 234 SECONDS
POC ACT LR: 238 SECONDS
POC ACT LR: 245 SECONDS
POC ACT LR: 246 SECONDS
POC ACT LR: 263 SECONDS
POC ACT LR: 269 SECONDS
POC ACT LR: 276 SECONDS
POC ACT LR: 326 SECONDS
POC ACT LR: >400 SECONDS
POSITIVE END EXP PRESS: 5 CMH2O
POSITIVE END EXP PRESS: 7 CMH2O
POTASSIUM REFLEX MAGNESIUM: 3.3 MMOL/L (ref 3.5–5)
POTASSIUM REFLEX MAGNESIUM: 3.8 MMOL/L (ref 3.5–5)
POTASSIUM REFLEX MAGNESIUM: 3.9 MMOL/L (ref 3.5–5)
POTASSIUM REFLEX MAGNESIUM: 4 MMOL/L (ref 3.5–5)
POTASSIUM SERPL-SCNC: 2.9 MMOL/L (ref 3.5–5)
POTASSIUM SERPL-SCNC: 3.1 MMOL/L (ref 3.5–5.5)
POTASSIUM SERPL-SCNC: 3.1 MMOL/L (ref 3.5–5.5)
POTASSIUM SERPL-SCNC: 3.5 MMOL/L (ref 3.5–5)
POTASSIUM SERPL-SCNC: 3.6 MMOL/L (ref 3.5–5)
POTASSIUM SERPL-SCNC: 3.6 MMOL/L (ref 3.5–5.5)
POTASSIUM SERPL-SCNC: 3.7 MMOL/L (ref 3.5–5)
POTASSIUM SERPL-SCNC: 3.8 MMOL/L (ref 3.5–5)
POTASSIUM SERPL-SCNC: 3.9 MMOL/L (ref 3.5–5)
POTASSIUM SERPL-SCNC: 4 MMOL/L (ref 3.5–5)
POTASSIUM SERPL-SCNC: 4.1 MMOL/L (ref 3.5–5)
POTASSIUM SERPL-SCNC: 4.1 MMOL/L (ref 3.5–5.5)
POTASSIUM SERPL-SCNC: 4.2 MMOL/L (ref 3.5–5)
POTASSIUM SERPL-SCNC: 4.2 MMOL/L (ref 3.5–5)
POTASSIUM SERPL-SCNC: 4.2 MMOL/L (ref 3.5–5.5)
POTASSIUM SERPL-SCNC: 4.3 MMOL/L (ref 3.5–5.5)
POTASSIUM SERPL-SCNC: 4.4 MMOL/L (ref 3.5–5)
POTASSIUM SERPL-SCNC: 4.4 MMOL/L (ref 3.5–5)
POTASSIUM SERPL-SCNC: 4.4 MMOL/L (ref 3.5–5.5)
POTASSIUM SERPL-SCNC: 4.4 MMOL/L (ref 3.5–5.5)
POTASSIUM SERPL-SCNC: 4.5 MMOL/L (ref 3.5–5)
POTASSIUM SERPL-SCNC: 4.8 MMOL/L (ref 3.5–5)
POTASSIUM SERPL-SCNC: 4.8 MMOL/L (ref 3.5–5)
POTASSIUM SERPL-SCNC: 5.1 MMOL/L (ref 3.5–5)
POTASSIUM SERPL-SCNC: 5.1 MMOL/L (ref 3.5–5.5)
POTASSIUM SERPL-SCNC: 5.2 MMOL/L (ref 3.5–5.5)
POTASSIUM SERPL-SCNC: 5.22 MMOL/L (ref 3.5–5)
POTASSIUM SERPL-SCNC: 5.94 MMOL/L (ref 3.5–5)
PRO-BNP: 188 PG/ML (ref 0–450)
PRO-BNP: 197 PG/ML (ref 0–450)
PRO-BNP: 214 PG/ML (ref 0–450)
PRO-BNP: 337 PG/ML (ref 0–450)
PROCALCITONIN: 0.08 NG/ML (ref 0–0.08)
PROTEIN UA: NEGATIVE MG/DL
PROTHROMBIN TIME: 11.7 SEC (ref 9.3–12.4)
PROTHROMBIN TIME: 13.4 SEC (ref 9.3–12.4)
PROTHROMBIN TIME: 20.5 SEC (ref 9.3–12.4)
R (REACTION TIME): 12.8 MIN (ref 5–10)
R (REACTION TIME): 16.3 MIN (ref 5–10)
R (REACTION TIME): 7.6 MIN (ref 5–10)
R (REACTION TIME): 8.2 MIN (ref 5–10)
R (REACTION TIME): 8.5 MIN (ref 5–10)
RBC # BLD: 1.34 E12/L (ref 3.5–5.5)
RBC # BLD: 2.53 E12/L (ref 3.5–5.5)
RBC # BLD: 2.6 E12/L (ref 3.5–5.5)
RBC # BLD: 2.68 E12/L (ref 3.5–5.5)
RBC # BLD: 2.73 E12/L (ref 3.5–5.5)
RBC # BLD: 2.85 E12/L (ref 3.5–5.5)
RBC # BLD: 2.92 E12/L (ref 3.5–5.5)
RBC # BLD: 3.13 E12/L (ref 3.5–5.5)
RBC # BLD: 3.13 E12/L (ref 3.5–5.5)
RBC # BLD: 3.26 E12/L (ref 3.5–5.5)
RBC # BLD: 3.28 E12/L (ref 3.5–5.5)
RBC # BLD: 3.36 E12/L (ref 3.5–5.5)
RBC # BLD: 3.77 E12/L (ref 3.5–5.5)
RBC # BLD: 3.96 E12/L (ref 3.5–5.5)
RBC # BLD: 4.33 E12/L (ref 3.5–5.5)
RBC # BLD: 4.5 E12/L (ref 3.5–5.5)
RBC # BLD: 4.55 E12/L (ref 3.5–5.5)
RBC # BLD: 4.65 E12/L (ref 3.5–5.5)
RBC # BLD: 4.68 E12/L (ref 3.5–5.5)
RBC # BLD: 4.92 E12/L (ref 3.5–5.5)
RBC UA: ABNORMAL /HPF (ref 0–2)
RBC UA: ABNORMAL /HPF (ref 0–2)
RBC UA: NORMAL /HPF (ref 0–2)
RBC UA: NORMAL /HPF (ref 0–2)
RENAL EPITHELIAL, UA: NORMAL /HPF
RESPIRATORY RATE: 10 B/MIN
RESPIRATORY RATE: 12 B/MIN
RESPIRATORY RATE: 12 B/MIN
RESPIRATORY RATE: 14 B/MIN
RESPIRATORY RATE: 1474 B/MIN
RESPIRATORY RATE: 16 B/MIN
SARS-COV-2: NOT DETECTED
SODIUM BLD-SCNC: 121 MMOL/L (ref 132–146)
SODIUM BLD-SCNC: 122 MMOL/L (ref 132–146)
SODIUM BLD-SCNC: 122 MMOL/L (ref 132–146)
SODIUM BLD-SCNC: 124 MMOL/L (ref 132–146)
SODIUM BLD-SCNC: 127 MMOL/L (ref 132–146)
SODIUM BLD-SCNC: 127 MMOL/L (ref 132–146)
SODIUM BLD-SCNC: 132 MMOL/L (ref 132–146)
SODIUM BLD-SCNC: 134 MMOL/L (ref 132–146)
SODIUM BLD-SCNC: 136 MMOL/L (ref 132–146)
SODIUM BLD-SCNC: 137 MMOL/L (ref 132–146)
SODIUM BLD-SCNC: 138 MMOL/L (ref 132–146)
SODIUM BLD-SCNC: 140 MMOL/L (ref 132–146)
SODIUM BLD-SCNC: 140 MMOL/L (ref 132–146)
SODIUM BLD-SCNC: 142 MMOL/L (ref 132–146)
SODIUM BLD-SCNC: 142 MMOL/L (ref 132–146)
SODIUM BLD-SCNC: 143 MMOL/L (ref 132–146)
SODIUM BLD-SCNC: 143 MMOL/L (ref 132–146)
SODIUM BLD-SCNC: 147 MMOL/L (ref 132–146)
SOURCE, BLOOD GAS: ABNORMAL
SOURCE: NORMAL
SPECIFIC GRAVITY UA: 1.02 (ref 1–1.03)
SPECIFIC GRAVITY UA: <=1.005 (ref 1–1.03)
THB: 7.3 G/DL (ref 11.5–16.5)
THB: 9.5 G/DL (ref 11.5–16.5)
TIDAL VOLUME: 400 ML
TIDAL VOLUME: 500 ML
TIME ANALYZED: 2323
TIME ANALYZED: 2338
TOTAL CK: 1295 U/L (ref 20–180)
TOTAL CK: 1897 U/L (ref 20–180)
TOTAL CK: 2728 U/L (ref 20–180)
TOTAL CK: 3004 U/L (ref 20–180)
TOTAL CK: 3464 U/L (ref 20–180)
TOTAL CK: 3499 U/L (ref 20–180)
TOTAL CK: 3692 U/L (ref 20–180)
TOTAL CK: 3778 U/L (ref 20–180)
TOTAL CK: 3853 U/L (ref 20–180)
TOTAL CK: 3907 U/L (ref 20–180)
TOTAL CK: 3960 U/L (ref 20–180)
TOTAL CK: 3960 U/L (ref 20–180)
TOTAL CK: 3966 U/L (ref 20–180)
TOTAL CK: 4046 U/L (ref 20–180)
TOTAL CK: 4090 U/L (ref 20–180)
TOTAL CK: 4162 U/L (ref 20–180)
TOTAL CK: 4881 U/L (ref 20–180)
TOTAL CK: 5551 U/L (ref 20–180)
TOTAL CK: 5595 U/L (ref 20–180)
TOTAL CK: 5721 U/L (ref 20–180)
TOTAL CK: 5912 U/L (ref 20–180)
TOTAL CK: 6027 U/L (ref 20–180)
TOTAL CK: 6069 U/L (ref 20–180)
TOTAL CK: 6405 U/L (ref 20–180)
TOTAL CK: 800 U/L (ref 20–180)
TOTAL PROTEIN: 1.2 G/DL (ref 6.4–8.3)
TOTAL PROTEIN: 3.7 G/DL (ref 6.4–8.3)
TOTAL PROTEIN: 3.9 G/DL (ref 6.4–8.3)
TOTAL PROTEIN: 4 G/DL (ref 6.4–8.3)
TOTAL PROTEIN: 4 G/DL (ref 6.4–8.3)
TOTAL PROTEIN: 4.1 G/DL (ref 6.4–8.3)
TOTAL PROTEIN: 4.2 G/DL (ref 6.4–8.3)
TOTAL PROTEIN: 6.4 G/DL (ref 6.4–8.3)
TOTAL PROTEIN: 6.9 G/DL (ref 6.4–8.3)
TOTAL PROTEIN: 7 G/DL (ref 6.4–8.3)
TOTAL PROTEIN: 7.5 G/DL (ref 6.4–8.3)
TRIGL SERPL-MCNC: 183 MG/DL (ref 0–149)
TROPONIN: <0.01 NG/ML (ref 0–0.03)
TSH SERPL DL<=0.05 MIU/L-ACNC: 3.25 UIU/ML (ref 0.27–4.2)
URINE CULTURE, ROUTINE: NORMAL
URINE CULTURE, ROUTINE: NORMAL
UROBILINOGEN, URINE: 0.2 E.U./DL
VLDLC SERPL CALC-MCNC: 37 MG/DL
WBC # BLD: 13.8 E9/L (ref 4.5–11.5)
WBC # BLD: 14.5 E9/L (ref 4.5–11.5)
WBC # BLD: 15.5 E9/L (ref 4.5–11.5)
WBC # BLD: 15.5 E9/L (ref 4.5–11.5)
WBC # BLD: 15.7 E9/L (ref 4.5–11.5)
WBC # BLD: 16.6 E9/L (ref 4.5–11.5)
WBC # BLD: 16.7 E9/L (ref 4.5–11.5)
WBC # BLD: 17.4 E9/L (ref 4.5–11.5)
WBC # BLD: 17.9 E9/L (ref 4.5–11.5)
WBC # BLD: 19.2 E9/L (ref 4.5–11.5)
WBC # BLD: 19.4 E9/L (ref 4.5–11.5)
WBC # BLD: 19.5 E9/L (ref 4.5–11.5)
WBC # BLD: 19.9 E9/L (ref 4.5–11.5)
WBC # BLD: 25.3 E9/L (ref 4.5–11.5)
WBC # BLD: 7.5 E9/L (ref 4.5–11.5)
WBC # BLD: 7.6 E9/L (ref 4.5–11.5)
WBC # BLD: 7.9 E9/L (ref 4.5–11.5)
WBC # BLD: 8.6 E9/L (ref 4.5–11.5)
WBC # BLD: 8.8 E9/L (ref 4.5–11.5)
WBC # BLD: 8.9 E9/L (ref 4.5–11.5)
WBC # BLD: 9 E9/L (ref 4.5–11.5)
WBC # BLD: 9.6 E9/L (ref 4.5–11.5)
WBC UA: ABNORMAL /HPF (ref 0–5)
WBC UA: ABNORMAL /HPF (ref 0–5)
WBC UA: NORMAL /HPF (ref 0–5)
WBC UA: NORMAL /HPF (ref 0–5)

## 2020-01-01 PROCEDURE — 85576 BLOOD PLATELET AGGREGATION: CPT

## 2020-01-01 PROCEDURE — 93005 ELECTROCARDIOGRAM TRACING: CPT | Performed by: STUDENT IN AN ORGANIZED HEALTH CARE EDUCATION/TRAINING PROGRAM

## 2020-01-01 PROCEDURE — 36415 COLL VENOUS BLD VENIPUNCTURE: CPT

## 2020-01-01 PROCEDURE — 6370000000 HC RX 637 (ALT 250 FOR IP): Performed by: NURSE PRACTITIONER

## 2020-01-01 PROCEDURE — 2500000003 HC RX 250 WO HCPCS

## 2020-01-01 PROCEDURE — 94640 AIRWAY INHALATION TREATMENT: CPT

## 2020-01-01 PROCEDURE — 6360000002 HC RX W HCPCS: Performed by: NURSE PRACTITIONER

## 2020-01-01 PROCEDURE — 6370000000 HC RX 637 (ALT 250 FOR IP): Performed by: INTERNAL MEDICINE

## 2020-01-01 PROCEDURE — 82803 BLOOD GASES ANY COMBINATION: CPT

## 2020-01-01 PROCEDURE — 2500000003 HC RX 250 WO HCPCS: Performed by: NURSE PRACTITIONER

## 2020-01-01 PROCEDURE — 6360000002 HC RX W HCPCS: Performed by: INTERNAL MEDICINE

## 2020-01-01 PROCEDURE — 6360000002 HC RX W HCPCS

## 2020-01-01 PROCEDURE — 71045 X-RAY EXAM CHEST 1 VIEW: CPT

## 2020-01-01 PROCEDURE — G0378 HOSPITAL OBSERVATION PER HR: HCPCS

## 2020-01-01 PROCEDURE — 6360000004 HC RX CONTRAST MEDICATION: Performed by: RADIOLOGY

## 2020-01-01 PROCEDURE — 83880 ASSAY OF NATRIURETIC PEPTIDE: CPT

## 2020-01-01 PROCEDURE — 37799 UNLISTED PX VASCULAR SURGERY: CPT

## 2020-01-01 PROCEDURE — 85018 HEMOGLOBIN: CPT

## 2020-01-01 PROCEDURE — 83735 ASSAY OF MAGNESIUM: CPT

## 2020-01-01 PROCEDURE — 85347 COAGULATION TIME ACTIVATED: CPT

## 2020-01-01 PROCEDURE — 2580000003 HC RX 258: Performed by: NURSE PRACTITIONER

## 2020-01-01 PROCEDURE — 6370000000 HC RX 637 (ALT 250 FOR IP): Performed by: FAMILY MEDICINE

## 2020-01-01 PROCEDURE — 6370000000 HC RX 637 (ALT 250 FOR IP): Performed by: EMERGENCY MEDICINE

## 2020-01-01 PROCEDURE — 99291 CRITICAL CARE FIRST HOUR: CPT | Performed by: NURSE PRACTITIONER

## 2020-01-01 PROCEDURE — 80048 BASIC METABOLIC PNL TOTAL CA: CPT

## 2020-01-01 PROCEDURE — 80061 LIPID PANEL: CPT

## 2020-01-01 PROCEDURE — 3600000008 HC SURGERY OHS BASE: Performed by: THORACIC SURGERY (CARDIOTHORACIC VASCULAR SURGERY)

## 2020-01-01 PROCEDURE — 2580000003 HC RX 258: Performed by: THORACIC SURGERY (CARDIOTHORACIC VASCULAR SURGERY)

## 2020-01-01 PROCEDURE — 99233 SBSQ HOSP IP/OBS HIGH 50: CPT | Performed by: INTERNAL MEDICINE

## 2020-01-01 PROCEDURE — 06BY4ZZ EXCISION OF LOWER VEIN, PERCUTANEOUS ENDOSCOPIC APPROACH: ICD-10-PCS | Performed by: THORACIC SURGERY (CARDIOTHORACIC VASCULAR SURGERY)

## 2020-01-01 PROCEDURE — B246ZZ4 ULTRASONOGRAPHY OF RIGHT AND LEFT HEART, TRANSESOPHAGEAL: ICD-10-PCS | Performed by: ANESTHESIOLOGY

## 2020-01-01 PROCEDURE — 6370000000 HC RX 637 (ALT 250 FOR IP)

## 2020-01-01 PROCEDURE — 3600000005 HC SURGERY LEVEL 5 BASE: Performed by: THORACIC SURGERY (CARDIOTHORACIC VASCULAR SURGERY)

## 2020-01-01 PROCEDURE — 2580000003 HC RX 258

## 2020-01-01 PROCEDURE — 85025 COMPLETE CBC W/AUTO DIFF WBC: CPT

## 2020-01-01 PROCEDURE — 94645 CONT INHLJ TX EACH ADDL HOUR: CPT

## 2020-01-01 PROCEDURE — C1769 GUIDE WIRE: HCPCS | Performed by: THORACIC SURGERY (CARDIOTHORACIC VASCULAR SURGERY)

## 2020-01-01 PROCEDURE — 99024 POSTOP FOLLOW-UP VISIT: CPT | Performed by: THORACIC SURGERY (CARDIOTHORACIC VASCULAR SURGERY)

## 2020-01-01 PROCEDURE — 86038 ANTINUCLEAR ANTIBODIES: CPT

## 2020-01-01 PROCEDURE — 33949 ECMO/ECLS DAILY MGMT ARTERY: CPT | Performed by: NURSE PRACTITIONER

## 2020-01-01 PROCEDURE — 2580000003 HC RX 258: Performed by: RADIOLOGY

## 2020-01-01 PROCEDURE — 93970 EXTREMITY STUDY: CPT

## 2020-01-01 PROCEDURE — 5A02210 ASSISTANCE WITH CARDIAC OUTPUT USING BALLOON PUMP, CONTINUOUS: ICD-10-PCS | Performed by: THORACIC SURGERY (CARDIOTHORACIC VASCULAR SURGERY)

## 2020-01-01 PROCEDURE — 85049 AUTOMATED PLATELET COUNT: CPT

## 2020-01-01 PROCEDURE — 33966 ECMO/ECLS RMVL PRPH CANNULA: CPT | Performed by: SURGERY

## 2020-01-01 PROCEDURE — 80053 COMPREHEN METABOLIC PANEL: CPT

## 2020-01-01 PROCEDURE — 5A1221Z PERFORMANCE OF CARDIAC OUTPUT, CONTINUOUS: ICD-10-PCS | Performed by: THORACIC SURGERY (CARDIOTHORACIC VASCULAR SURGERY)

## 2020-01-01 PROCEDURE — 82550 ASSAY OF CK (CPK): CPT

## 2020-01-01 PROCEDURE — 2580000003 HC RX 258: Performed by: INTERNAL MEDICINE

## 2020-01-01 PROCEDURE — 99285 EMERGENCY DEPT VISIT HI MDM: CPT

## 2020-01-01 PROCEDURE — 2580000003 HC RX 258: Performed by: SURGERY

## 2020-01-01 PROCEDURE — 86901 BLOOD TYPING SEROLOGIC RH(D): CPT

## 2020-01-01 PROCEDURE — 84100 ASSAY OF PHOSPHORUS: CPT

## 2020-01-01 PROCEDURE — 3700000001 HC ADD 15 MINUTES (ANESTHESIA): Performed by: SURGERY

## 2020-01-01 PROCEDURE — 85027 COMPLETE CBC AUTOMATED: CPT

## 2020-01-01 PROCEDURE — 99232 SBSQ HOSP IP/OBS MODERATE 35: CPT | Performed by: INTERNAL MEDICINE

## 2020-01-01 PROCEDURE — 2580000003 HC RX 258: Performed by: EMERGENCY MEDICINE

## 2020-01-01 PROCEDURE — 2580000003 HC RX 258: Performed by: STUDENT IN AN ORGANIZED HEALTH CARE EDUCATION/TRAINING PROGRAM

## 2020-01-01 PROCEDURE — 93922 UPR/L XTREMITY ART 2 LEVELS: CPT

## 2020-01-01 PROCEDURE — 6360000002 HC RX W HCPCS: Performed by: FAMILY MEDICINE

## 2020-01-01 PROCEDURE — 99232 SBSQ HOSP IP/OBS MODERATE 35: CPT | Performed by: THORACIC SURGERY (CARDIOTHORACIC VASCULAR SURGERY)

## 2020-01-01 PROCEDURE — 96360 HYDRATION IV INFUSION INIT: CPT

## 2020-01-01 PROCEDURE — C9113 INJ PANTOPRAZOLE SODIUM, VIA: HCPCS | Performed by: NURSE PRACTITIONER

## 2020-01-01 PROCEDURE — 2140000000 HC CCU INTERMEDIATE R&B

## 2020-01-01 PROCEDURE — 87088 URINE BACTERIA CULTURE: CPT

## 2020-01-01 PROCEDURE — 2000000000 HC ICU R&B

## 2020-01-01 PROCEDURE — 83605 ASSAY OF LACTIC ACID: CPT

## 2020-01-01 PROCEDURE — 82962 GLUCOSE BLOOD TEST: CPT

## 2020-01-01 PROCEDURE — 71275 CT ANGIOGRAPHY CHEST: CPT

## 2020-01-01 PROCEDURE — 86923 COMPATIBILITY TEST ELECTRIC: CPT

## 2020-01-01 PROCEDURE — 93010 ELECTROCARDIOGRAM REPORT: CPT | Performed by: INTERNAL MEDICINE

## 2020-01-01 PROCEDURE — 85730 THROMBOPLASTIN TIME PARTIAL: CPT

## 2020-01-01 PROCEDURE — 2720000010 HC SURG SUPPLY STERILE: Performed by: SURGERY

## 2020-01-01 PROCEDURE — 5A1522G EXTRACORPOREAL OXYGENATION, MEMBRANE, PERIPHERAL VENO-ARTERIAL: ICD-10-PCS | Performed by: THORACIC SURGERY (CARDIOTHORACIC VASCULAR SURGERY)

## 2020-01-01 PROCEDURE — 3600000005 HC SURGERY LEVEL 5 BASE: Performed by: SURGERY

## 2020-01-01 PROCEDURE — 81001 URINALYSIS AUTO W/SCOPE: CPT

## 2020-01-01 PROCEDURE — 86850 RBC ANTIBODY SCREEN: CPT

## 2020-01-01 PROCEDURE — 6360000002 HC RX W HCPCS: Performed by: EMERGENCY MEDICINE

## 2020-01-01 PROCEDURE — 36592 COLLECT BLOOD FROM PICC: CPT

## 2020-01-01 PROCEDURE — 6370000000 HC RX 637 (ALT 250 FOR IP): Performed by: THORACIC SURGERY (CARDIOTHORACIC VASCULAR SURGERY)

## 2020-01-01 PROCEDURE — 93017 CV STRESS TEST TRACING ONLY: CPT

## 2020-01-01 PROCEDURE — 2720000010 HC SURG SUPPLY STERILE: Performed by: THORACIC SURGERY (CARDIOTHORACIC VASCULAR SURGERY)

## 2020-01-01 PROCEDURE — 3700000001 HC ADD 15 MINUTES (ANESTHESIA): Performed by: THORACIC SURGERY (CARDIOTHORACIC VASCULAR SURGERY)

## 2020-01-01 PROCEDURE — 94664 DEMO&/EVAL PT USE INHALER: CPT

## 2020-01-01 PROCEDURE — 93880 EXTRACRANIAL BILAT STUDY: CPT

## 2020-01-01 PROCEDURE — 82805 BLOOD GASES W/O2 SATURATION: CPT

## 2020-01-01 PROCEDURE — 93018 CV STRESS TEST I&R ONLY: CPT | Performed by: INTERNAL MEDICINE

## 2020-01-01 PROCEDURE — 6360000002 HC RX W HCPCS: Performed by: SURGERY

## 2020-01-01 PROCEDURE — 84145 PROCALCITONIN (PCT): CPT

## 2020-01-01 PROCEDURE — 02HA0RZ INSERTION OF SHORT-TERM EXTERNAL HEART ASSIST SYSTEM INTO HEART, OPEN APPROACH: ICD-10-PCS | Performed by: THORACIC SURGERY (CARDIOTHORACIC VASCULAR SURGERY)

## 2020-01-01 PROCEDURE — 6370000000 HC RX 637 (ALT 250 FOR IP): Performed by: STUDENT IN AN ORGANIZED HEALTH CARE EDUCATION/TRAINING PROGRAM

## 2020-01-01 PROCEDURE — 85384 FIBRINOGEN ACTIVITY: CPT

## 2020-01-01 PROCEDURE — 84484 ASSAY OF TROPONIN QUANT: CPT

## 2020-01-01 PROCEDURE — C1887 CATHETER, GUIDING: HCPCS | Performed by: THORACIC SURGERY (CARDIOTHORACIC VASCULAR SURGERY)

## 2020-01-01 PROCEDURE — 85014 HEMATOCRIT: CPT

## 2020-01-01 PROCEDURE — 93306 TTE W/DOPPLER COMPLETE: CPT

## 2020-01-01 PROCEDURE — 3700000000 HC ANESTHESIA ATTENDED CARE: Performed by: THORACIC SURGERY (CARDIOTHORACIC VASCULAR SURGERY)

## 2020-01-01 PROCEDURE — 3700000000 HC ANESTHESIA ATTENDED CARE: Performed by: SURGERY

## 2020-01-01 PROCEDURE — P9016 RBC LEUKOCYTES REDUCED: HCPCS

## 2020-01-01 PROCEDURE — 94003 VENT MGMT INPAT SUBQ DAY: CPT

## 2020-01-01 PROCEDURE — 36430 TRANSFUSION BLD/BLD COMPNT: CPT

## 2020-01-01 PROCEDURE — 83036 HEMOGLOBIN GLYCOSYLATED A1C: CPT

## 2020-01-01 PROCEDURE — 5A1955Z RESPIRATORY VENTILATION, GREATER THAN 96 CONSECUTIVE HOURS: ICD-10-PCS | Performed by: THORACIC SURGERY (CARDIOTHORACIC VASCULAR SURGERY)

## 2020-01-01 PROCEDURE — 3600000018 HC SURGERY OHS ADDTL 15MIN: Performed by: THORACIC SURGERY (CARDIOTHORACIC VASCULAR SURGERY)

## 2020-01-01 PROCEDURE — 84132 ASSAY OF SERUM POTASSIUM: CPT

## 2020-01-01 PROCEDURE — 96372 THER/PROPH/DIAG INJ SC/IM: CPT

## 2020-01-01 PROCEDURE — C1713 ANCHOR/SCREW BN/BN,TIS/BN: HCPCS | Performed by: THORACIC SURGERY (CARDIOTHORACIC VASCULAR SURGERY)

## 2020-01-01 PROCEDURE — 99232 SBSQ HOSP IP/OBS MODERATE 35: CPT | Performed by: SURGERY

## 2020-01-01 PROCEDURE — P9035 PLATELET PHERES LEUKOREDUCED: HCPCS

## 2020-01-01 PROCEDURE — 86900 BLOOD TYPING SEROLOGIC ABO: CPT

## 2020-01-01 PROCEDURE — P9045 ALBUMIN (HUMAN), 5%, 250 ML: HCPCS | Performed by: NURSE PRACTITIONER

## 2020-01-01 PROCEDURE — 76998 US GUIDE INTRAOP: CPT

## 2020-01-01 PROCEDURE — 2709999900 HC NON-CHARGEABLE SUPPLY: Performed by: THORACIC SURGERY (CARDIOTHORACIC VASCULAR SURGERY)

## 2020-01-01 PROCEDURE — 33949 ECMO/ECLS DAILY MGMT ARTERY: CPT | Performed by: THORACIC SURGERY (CARDIOTHORACIC VASCULAR SURGERY)

## 2020-01-01 PROCEDURE — 99222 1ST HOSP IP/OBS MODERATE 55: CPT | Performed by: THORACIC SURGERY (CARDIOTHORACIC VASCULAR SURGERY)

## 2020-01-01 PROCEDURE — 6360000002 HC RX W HCPCS: Performed by: THORACIC SURGERY (CARDIOTHORACIC VASCULAR SURGERY)

## 2020-01-01 PROCEDURE — 34716 OPN AX/SUBCLA ART EXPOS CNDT: CPT | Performed by: THORACIC SURGERY (CARDIOTHORACIC VASCULAR SURGERY)

## 2020-01-01 PROCEDURE — 93926 LOWER EXTREMITY STUDY: CPT

## 2020-01-01 PROCEDURE — 33952 ECMO/ECLS INSJ PRPH CANNULA: CPT | Performed by: THORACIC SURGERY (CARDIOTHORACIC VASCULAR SURGERY)

## 2020-01-01 PROCEDURE — 85300 ANTITHROMBIN III ACTIVITY: CPT

## 2020-01-01 PROCEDURE — 93454 CORONARY ARTERY ANGIO S&I: CPT | Performed by: INTERNAL MEDICINE

## 2020-01-01 PROCEDURE — 70450 CT HEAD/BRAIN W/O DYE: CPT

## 2020-01-01 PROCEDURE — 3430000000 HC RX DIAGNOSTIC RADIOPHARMACEUTICAL: Performed by: RADIOLOGY

## 2020-01-01 PROCEDURE — 2580000003 HC RX 258: Performed by: FAMILY MEDICINE

## 2020-01-01 PROCEDURE — 36591 DRAW BLOOD OFF VENOUS DEVICE: CPT

## 2020-01-01 PROCEDURE — 94375 RESPIRATORY FLOW VOLUME LOOP: CPT

## 2020-01-01 PROCEDURE — 99222 1ST HOSP IP/OBS MODERATE 55: CPT | Performed by: SURGERY

## 2020-01-01 PROCEDURE — 94726 PLETHYSMOGRAPHY LUNG VOLUMES: CPT

## 2020-01-01 PROCEDURE — 83615 LACTATE (LD) (LDH) ENZYME: CPT

## 2020-01-01 PROCEDURE — 93005 ELECTROCARDIOGRAM TRACING: CPT | Performed by: EMERGENCY MEDICINE

## 2020-01-01 PROCEDURE — 85610 PROTHROMBIN TIME: CPT

## 2020-01-01 PROCEDURE — 36140 INTRO NDL ICATH UPR/LXTR ART: CPT | Performed by: SURGERY

## 2020-01-01 PROCEDURE — C1768 GRAFT, VASCULAR: HCPCS | Performed by: THORACIC SURGERY (CARDIOTHORACIC VASCULAR SURGERY)

## 2020-01-01 PROCEDURE — 71046 X-RAY EXAM CHEST 2 VIEWS: CPT

## 2020-01-01 PROCEDURE — P9047 ALBUMIN (HUMAN), 25%, 50ML: HCPCS | Performed by: INTERNAL MEDICINE

## 2020-01-01 PROCEDURE — 99233 SBSQ HOSP IP/OBS HIGH 50: CPT | Performed by: SURGERY

## 2020-01-01 PROCEDURE — 2709999900 HC NON-CHARGEABLE SUPPLY: Performed by: SURGERY

## 2020-01-01 PROCEDURE — 5A0221D ASSISTANCE WITH CARDIAC OUTPUT USING IMPELLER PUMP, CONTINUOUS: ICD-10-PCS | Performed by: THORACIC SURGERY (CARDIOTHORACIC VASCULAR SURGERY)

## 2020-01-01 PROCEDURE — 33533 CABG ARTERIAL SINGLE: CPT | Performed by: THORACIC SURGERY (CARDIOTHORACIC VASCULAR SURGERY)

## 2020-01-01 PROCEDURE — 3209999900 FLUORO FOR SURGICAL PROCEDURES

## 2020-01-01 PROCEDURE — 99284 EMERGENCY DEPT VISIT MOD MDM: CPT

## 2020-01-01 PROCEDURE — 94002 VENT MGMT INPAT INIT DAY: CPT

## 2020-01-01 PROCEDURE — 3600000015 HC SURGERY LEVEL 5 ADDTL 15MIN: Performed by: SURGERY

## 2020-01-01 PROCEDURE — 33971 AORTIC CIRCULATION ASSIST: CPT | Performed by: THORACIC SURGERY (CARDIOTHORACIC VASCULAR SURGERY)

## 2020-01-01 PROCEDURE — B2111ZZ FLUOROSCOPY OF MULTIPLE CORONARY ARTERIES USING LOW OSMOLAR CONTRAST: ICD-10-PCS | Performed by: INTERNAL MEDICINE

## 2020-01-01 PROCEDURE — 78452 HT MUSCLE IMAGE SPECT MULT: CPT

## 2020-01-01 PROCEDURE — U0003 INFECTIOUS AGENT DETECTION BY NUCLEIC ACID (DNA OR RNA); SEVERE ACUTE RESPIRATORY SYNDROME CORONAVIRUS 2 (SARS-COV-2) (CORONAVIRUS DISEASE [COVID-19]), AMPLIFIED PROBE TECHNIQUE, MAKING USE OF HIGH THROUGHPUT TECHNOLOGIES AS DESCRIBED BY CMS-2020-01-R: HCPCS

## 2020-01-01 PROCEDURE — 02100Z9 BYPASS CORONARY ARTERY, ONE ARTERY FROM LEFT INTERNAL MAMMARY, OPEN APPROACH: ICD-10-PCS | Performed by: THORACIC SURGERY (CARDIOTHORACIC VASCULAR SURGERY)

## 2020-01-01 PROCEDURE — 93016 CV STRESS TEST SUPVJ ONLY: CPT | Performed by: INTERNAL MEDICINE

## 2020-01-01 PROCEDURE — 99222 1ST HOSP IP/OBS MODERATE 55: CPT | Performed by: INTERNAL MEDICINE

## 2020-01-01 PROCEDURE — 06PY03Z REMOVAL OF INFUSION DEVICE FROM LOWER VEIN, OPEN APPROACH: ICD-10-PCS | Performed by: SURGERY

## 2020-01-01 PROCEDURE — 2500000003 HC RX 250 WO HCPCS: Performed by: THORACIC SURGERY (CARDIOTHORACIC VASCULAR SURGERY)

## 2020-01-01 PROCEDURE — 75710 ARTERY X-RAYS ARM/LEG: CPT | Performed by: SURGERY

## 2020-01-01 PROCEDURE — APPSS60 APP SPLIT SHARED TIME 46-60 MINUTES: Performed by: NURSE PRACTITIONER

## 2020-01-01 PROCEDURE — A9500 TC99M SESTAMIBI: HCPCS | Performed by: RADIOLOGY

## 2020-01-01 PROCEDURE — 33517 CABG ARTERY-VEIN SINGLE: CPT | Performed by: THORACIC SURGERY (CARDIOTHORACIC VASCULAR SURGERY)

## 2020-01-01 PROCEDURE — 33990 INSJ PERQ VAD L HRT ARTERIAL: CPT | Performed by: THORACIC SURGERY (CARDIOTHORACIC VASCULAR SURGERY)

## 2020-01-01 PROCEDURE — 93931 UPPER EXTREMITY STUDY: CPT

## 2020-01-01 PROCEDURE — 33947 ECMO/ECLS INITIATION ARTERY: CPT | Performed by: THORACIC SURGERY (CARDIOTHORACIC VASCULAR SURGERY)

## 2020-01-01 PROCEDURE — 3600000015 HC SURGERY LEVEL 5 ADDTL 15MIN: Performed by: THORACIC SURGERY (CARDIOTHORACIC VASCULAR SURGERY)

## 2020-01-01 PROCEDURE — 94644 CONT INHLJ TX 1ST HOUR: CPT

## 2020-01-01 PROCEDURE — 99231 SBSQ HOSP IP/OBS SF/LOW 25: CPT | Performed by: INTERNAL MEDICINE

## 2020-01-01 PROCEDURE — 84443 ASSAY THYROID STIM HORMONE: CPT

## 2020-01-01 PROCEDURE — P9041 ALBUMIN (HUMAN),5%, 50ML: HCPCS

## 2020-01-01 PROCEDURE — 021009W BYPASS CORONARY ARTERY, ONE ARTERY FROM AORTA WITH AUTOLOGOUS VENOUS TISSUE, OPEN APPROACH: ICD-10-PCS | Performed by: THORACIC SURGERY (CARDIOTHORACIC VASCULAR SURGERY)

## 2020-01-01 PROCEDURE — 33508 ENDOSCOPIC VEIN HARVEST: CPT | Performed by: THORACIC SURGERY (CARDIOTHORACIC VASCULAR SURGERY)

## 2020-01-01 PROCEDURE — 74018 RADEX ABDOMEN 1 VIEW: CPT

## 2020-01-01 PROCEDURE — 85378 FIBRIN DEGRADE SEMIQUANT: CPT

## 2020-01-01 PROCEDURE — 94729 DIFFUSING CAPACITY: CPT

## 2020-01-01 DEVICE — SCREW BNE L9MM DIA2.3MM THOR STRNL TI LOK DRL FREE LEV 1: Type: IMPLANTABLE DEVICE | Site: STERNUM | Status: FUNCTIONAL

## 2020-01-01 DEVICE — PLATE LCK STRNL 8-H LAD T 1.8MM: Type: IMPLANTABLE DEVICE | Site: STERNUM | Status: FUNCTIONAL

## 2020-01-01 DEVICE — GRAFT VASC L300MM OD8MM UNIV STR STD WALL N TAPR NRINGED: Type: IMPLANTABLE DEVICE | Site: CHEST | Status: FUNCTIONAL

## 2020-01-01 DEVICE — PLATE BONE 1.8MM THICKNESS STRNL LCK 6 H CP TI: Type: IMPLANTABLE DEVICE | Site: STERNUM | Status: FUNCTIONAL

## 2020-01-01 RX ORDER — 0.9 % SODIUM CHLORIDE 0.9 %
20 INTRAVENOUS SOLUTION INTRAVENOUS ONCE
Status: DISCONTINUED | OUTPATIENT
Start: 2020-01-01 | End: 2020-06-08 | Stop reason: HOSPADM

## 2020-01-01 RX ORDER — DEXTROSE MONOHYDRATE 25 G/50ML
12.5 INJECTION, SOLUTION INTRAVENOUS PRN
Status: DISCONTINUED | OUTPATIENT
Start: 2020-01-01 | End: 2020-06-08 | Stop reason: HOSPADM

## 2020-01-01 RX ORDER — NICOTINE POLACRILEX 4 MG
15 LOZENGE BUCCAL PRN
Status: DISCONTINUED | OUTPATIENT
Start: 2020-01-01 | End: 2020-06-08 | Stop reason: HOSPADM

## 2020-01-01 RX ORDER — PANTOPRAZOLE SODIUM 40 MG/10ML
40 INJECTION, POWDER, LYOPHILIZED, FOR SOLUTION INTRAVENOUS DAILY
Status: DISCONTINUED | OUTPATIENT
Start: 2020-01-01 | End: 2020-06-08 | Stop reason: HOSPADM

## 2020-01-01 RX ORDER — SODIUM CHLORIDE 9 MG/ML
10 INJECTION INTRAVENOUS DAILY
Status: DISCONTINUED | OUTPATIENT
Start: 2020-01-01 | End: 2020-06-08 | Stop reason: HOSPADM

## 2020-01-01 RX ORDER — PROPOFOL 10 MG/ML
INJECTION, EMULSION INTRAVENOUS PRN
Status: DISCONTINUED | OUTPATIENT
Start: 2020-01-01 | End: 2020-01-01 | Stop reason: SDUPTHER

## 2020-01-01 RX ORDER — ORPHENADRINE CITRATE 30 MG/ML
60 INJECTION INTRAMUSCULAR; INTRAVENOUS ONCE
Status: COMPLETED | OUTPATIENT
Start: 2020-01-01 | End: 2020-01-01

## 2020-01-01 RX ORDER — HEPARIN SODIUM 10000 [USP'U]/ML
INJECTION, SOLUTION INTRAVENOUS; SUBCUTANEOUS PRN
Status: DISCONTINUED | OUTPATIENT
Start: 2020-01-01 | End: 2020-01-01 | Stop reason: SDUPTHER

## 2020-01-01 RX ORDER — ASPIRIN 81 MG/1
81 TABLET ORAL DAILY
Status: DISCONTINUED | OUTPATIENT
Start: 2020-01-01 | End: 2020-01-01

## 2020-01-01 RX ORDER — BUDESONIDE AND FORMOTEROL FUMARATE DIHYDRATE 80; 4.5 UG/1; UG/1
2 AEROSOL RESPIRATORY (INHALATION) 2 TIMES DAILY
Qty: 1 INHALER | Refills: 3 | Status: SHIPPED | OUTPATIENT
Start: 2020-01-01 | End: 2020-01-01 | Stop reason: SDUPTHER

## 2020-01-01 RX ORDER — ALBUMIN, HUMAN INJ 5% 5 %
25 SOLUTION INTRAVENOUS PRN
Status: DISCONTINUED | OUTPATIENT
Start: 2020-01-01 | End: 2020-01-01

## 2020-01-01 RX ORDER — IPRATROPIUM BROMIDE AND ALBUTEROL SULFATE 2.5; .5 MG/3ML; MG/3ML
1 SOLUTION RESPIRATORY (INHALATION)
Status: DISCONTINUED | OUTPATIENT
Start: 2020-01-01 | End: 2020-06-08 | Stop reason: HOSPADM

## 2020-01-01 RX ORDER — CALCIUM CHLORIDE 100 MG/ML
INJECTION INTRAVENOUS; INTRAVENTRICULAR
Status: DISPENSED
Start: 2020-01-01 | End: 2020-01-01

## 2020-01-01 RX ORDER — 0.9 % SODIUM CHLORIDE 0.9 %
1000 INTRAVENOUS SOLUTION INTRAVENOUS ONCE
Status: COMPLETED | OUTPATIENT
Start: 2020-01-01 | End: 2020-01-01

## 2020-01-01 RX ORDER — FENTANYL CITRATE 50 UG/ML
25 INJECTION, SOLUTION INTRAMUSCULAR; INTRAVENOUS
Status: DISCONTINUED | OUTPATIENT
Start: 2020-01-01 | End: 2020-06-08 | Stop reason: HOSPADM

## 2020-01-01 RX ORDER — PRAVASTATIN SODIUM 20 MG
20 TABLET ORAL NIGHTLY
Status: DISCONTINUED | OUTPATIENT
Start: 2020-01-01 | End: 2020-01-01

## 2020-01-01 RX ORDER — ACETAMINOPHEN 650 MG/1
650 SUPPOSITORY RECTAL EVERY 4 HOURS PRN
Status: DISCONTINUED | OUTPATIENT
Start: 2020-01-01 | End: 2020-06-08 | Stop reason: HOSPADM

## 2020-01-01 RX ORDER — ASPIRIN 325 MG
325 TABLET ORAL ONCE
Status: COMPLETED | OUTPATIENT
Start: 2020-01-01 | End: 2020-01-01

## 2020-01-01 RX ORDER — OXYCODONE HYDROCHLORIDE 5 MG/1
10 TABLET ORAL EVERY 4 HOURS PRN
Status: DISCONTINUED | OUTPATIENT
Start: 2020-01-01 | End: 2020-06-08 | Stop reason: HOSPADM

## 2020-01-01 RX ORDER — ALBUMIN, HUMAN INJ 5% 5 %
SOLUTION INTRAVENOUS
Status: COMPLETED
Start: 2020-01-01 | End: 2020-01-01

## 2020-01-01 RX ORDER — CALCIUM GLUCONATE 94 MG/ML
INJECTION, SOLUTION INTRAVENOUS
Status: DISCONTINUED
Start: 2020-01-01 | End: 2020-06-08 | Stop reason: HOSPADM

## 2020-01-01 RX ORDER — HYDRALAZINE HYDROCHLORIDE 50 MG/1
100 TABLET, FILM COATED ORAL EVERY 8 HOURS SCHEDULED
Status: DISCONTINUED | OUTPATIENT
Start: 2020-01-01 | End: 2020-01-01

## 2020-01-01 RX ORDER — PANTOPRAZOLE SODIUM 40 MG/1
40 TABLET, DELAYED RELEASE ORAL DAILY
Status: DISCONTINUED | OUTPATIENT
Start: 2020-01-01 | End: 2020-01-01

## 2020-01-01 RX ORDER — DILTIAZEM HYDROCHLORIDE 240 MG/1
240 CAPSULE, COATED, EXTENDED RELEASE ORAL DAILY
Status: DISCONTINUED | OUTPATIENT
Start: 2020-01-01 | End: 2020-01-01

## 2020-01-01 RX ORDER — FENTANYL CITRATE 50 UG/ML
50 INJECTION, SOLUTION INTRAMUSCULAR; INTRAVENOUS
Status: DISCONTINUED | OUTPATIENT
Start: 2020-01-01 | End: 2020-06-08 | Stop reason: HOSPADM

## 2020-01-01 RX ORDER — POTASSIUM CHLORIDE 20 MEQ/1
40 TABLET, EXTENDED RELEASE ORAL ONCE
Status: COMPLETED | OUTPATIENT
Start: 2020-01-01 | End: 2020-01-01

## 2020-01-01 RX ORDER — SODIUM CHLORIDE 0.9 % (FLUSH) 0.9 %
10 SYRINGE (ML) INJECTION PRN
Status: DISCONTINUED | OUTPATIENT
Start: 2020-01-01 | End: 2020-06-08 | Stop reason: HOSPADM

## 2020-01-01 RX ORDER — NITROGLYCERIN 5 MG/ML
INJECTION, SOLUTION INTRAVENOUS PRN
Status: DISCONTINUED | OUTPATIENT
Start: 2020-01-01 | End: 2020-01-01 | Stop reason: SDUPTHER

## 2020-01-01 RX ORDER — IPRATROPIUM BROMIDE AND ALBUTEROL SULFATE 2.5; .5 MG/3ML; MG/3ML
1 SOLUTION RESPIRATORY (INHALATION) EVERY 4 HOURS PRN
Status: DISCONTINUED | OUTPATIENT
Start: 2020-01-01 | End: 2020-01-01 | Stop reason: HOSPADM

## 2020-01-01 RX ORDER — SODIUM CHLORIDE 0.9 % (FLUSH) 0.9 %
10 SYRINGE (ML) INJECTION PRN
Status: DISCONTINUED | OUTPATIENT
Start: 2020-01-01 | End: 2020-01-01

## 2020-01-01 RX ORDER — VECURONIUM BROMIDE 1 MG/ML
INJECTION, POWDER, LYOPHILIZED, FOR SOLUTION INTRAVENOUS PRN
Status: DISCONTINUED | OUTPATIENT
Start: 2020-01-01 | End: 2020-01-01 | Stop reason: SDUPTHER

## 2020-01-01 RX ORDER — HEPARIN SODIUM 1000 [USP'U]/ML
INJECTION, SOLUTION INTRAVENOUS; SUBCUTANEOUS PRN
Status: DISCONTINUED | OUTPATIENT
Start: 2020-01-01 | End: 2020-01-01 | Stop reason: SDUPTHER

## 2020-01-01 RX ORDER — SODIUM CHLORIDE 9 MG/ML
10 INJECTION INTRAVENOUS DAILY
Status: DISCONTINUED | OUTPATIENT
Start: 2020-01-01 | End: 2020-01-01

## 2020-01-01 RX ORDER — SODIUM CHLORIDE 0.9 % (FLUSH) 0.9 %
10 SYRINGE (ML) INJECTION ONCE
Status: COMPLETED | OUTPATIENT
Start: 2020-01-01 | End: 2020-01-01

## 2020-01-01 RX ORDER — AMLODIPINE BESYLATE 5 MG/1
5 TABLET ORAL NIGHTLY
Status: DISCONTINUED | OUTPATIENT
Start: 2020-01-01 | End: 2020-01-01 | Stop reason: HOSPADM

## 2020-01-01 RX ORDER — ALBUMIN, HUMAN INJ 5% 5 %
25 SOLUTION INTRAVENOUS ONCE
Status: DISCONTINUED | OUTPATIENT
Start: 2020-01-01 | End: 2020-06-08 | Stop reason: HOSPADM

## 2020-01-01 RX ORDER — ACETAMINOPHEN 325 MG/1
650 TABLET ORAL EVERY 4 HOURS PRN
Status: DISCONTINUED | OUTPATIENT
Start: 2020-01-01 | End: 2020-01-01

## 2020-01-01 RX ORDER — DEXTROSE MONOHYDRATE 50 MG/ML
100 INJECTION, SOLUTION INTRAVENOUS PRN
Status: DISCONTINUED | OUTPATIENT
Start: 2020-01-01 | End: 2020-06-08 | Stop reason: HOSPADM

## 2020-01-01 RX ORDER — EPINEPHRINE 0.1 MG/ML
SYRINGE (ML) INJECTION PRN
Status: DISCONTINUED | OUTPATIENT
Start: 2020-01-01 | End: 2020-01-01 | Stop reason: SDUPTHER

## 2020-01-01 RX ORDER — LEVOTHYROXINE SODIUM ANHYDROUS 100 UG/5ML
12.5 INJECTION, POWDER, LYOPHILIZED, FOR SOLUTION INTRAVENOUS DAILY
Status: DISCONTINUED | OUTPATIENT
Start: 2020-01-01 | End: 2020-01-01 | Stop reason: CLARIF

## 2020-01-01 RX ORDER — VITAMIN B COMPLEX
1000 TABLET ORAL DAILY
Status: DISCONTINUED | OUTPATIENT
Start: 2020-01-01 | End: 2020-01-01 | Stop reason: HOSPADM

## 2020-01-01 RX ORDER — ALBUMIN (HUMAN) 12.5 G/50ML
25 SOLUTION INTRAVENOUS EVERY 8 HOURS
Status: DISCONTINUED | OUTPATIENT
Start: 2020-01-01 | End: 2020-06-08 | Stop reason: HOSPADM

## 2020-01-01 RX ORDER — MONTELUKAST SODIUM 10 MG/1
10 TABLET ORAL NIGHTLY
Status: DISCONTINUED | OUTPATIENT
Start: 2020-01-01 | End: 2020-01-01 | Stop reason: HOSPADM

## 2020-01-01 RX ORDER — ROCURONIUM BROMIDE 10 MG/ML
INJECTION, SOLUTION INTRAVENOUS
Status: COMPLETED
Start: 2020-01-01 | End: 2020-01-01

## 2020-01-01 RX ORDER — SODIUM CHLORIDE 0.9 % (FLUSH) 0.9 %
10 SYRINGE (ML) INJECTION PRN
Status: DISCONTINUED | OUTPATIENT
Start: 2020-01-01 | End: 2020-01-01 | Stop reason: SDUPTHER

## 2020-01-01 RX ORDER — SENNA AND DOCUSATE SODIUM 50; 8.6 MG/1; MG/1
1 TABLET, FILM COATED ORAL 2 TIMES DAILY
Status: DISCONTINUED | OUTPATIENT
Start: 2020-01-01 | End: 2020-06-08 | Stop reason: HOSPADM

## 2020-01-01 RX ORDER — ALBUMIN, HUMAN INJ 5% 5 %
25 SOLUTION INTRAVENOUS ONCE
Status: COMPLETED | OUTPATIENT
Start: 2020-01-01 | End: 2020-01-01

## 2020-01-01 RX ORDER — CEFAZOLIN SODIUM 2 G/50ML
2 SOLUTION INTRAVENOUS EVERY 8 HOURS
Status: COMPLETED | OUTPATIENT
Start: 2020-01-01 | End: 2020-01-01

## 2020-01-01 RX ORDER — NITROGLYCERIN 20 MG/100ML
5 INJECTION INTRAVENOUS CONTINUOUS
Status: DISCONTINUED | OUTPATIENT
Start: 2020-01-01 | End: 2020-01-01

## 2020-01-01 RX ORDER — POTASSIUM CHLORIDE 29.8 MG/ML
20 INJECTION INTRAVENOUS PRN
Status: DISCONTINUED | OUTPATIENT
Start: 2020-01-01 | End: 2020-06-08 | Stop reason: HOSPADM

## 2020-01-01 RX ORDER — MAGNESIUM SULFATE IN WATER 40 MG/ML
2 INJECTION, SOLUTION INTRAVENOUS PRN
Status: DISCONTINUED | OUTPATIENT
Start: 2020-01-01 | End: 2020-06-08 | Stop reason: HOSPADM

## 2020-01-01 RX ORDER — ASPIRIN 300 MG/1
300 SUPPOSITORY RECTAL ONCE
Status: DISCONTINUED | OUTPATIENT
Start: 2020-01-01 | End: 2020-01-01

## 2020-01-01 RX ORDER — HEPARIN SODIUM 10000 [USP'U]/100ML
10 INJECTION, SOLUTION INTRAVENOUS CONTINUOUS
Status: DISCONTINUED | OUTPATIENT
Start: 2020-01-01 | End: 2020-01-01

## 2020-01-01 RX ORDER — SODIUM CHLORIDE 9 MG/ML
INJECTION, SOLUTION INTRAVENOUS CONTINUOUS PRN
Status: DISCONTINUED | OUTPATIENT
Start: 2020-01-01 | End: 2020-01-01 | Stop reason: SDUPTHER

## 2020-01-01 RX ORDER — SODIUM CHLORIDE 9 MG/ML
INJECTION, SOLUTION INTRAVENOUS CONTINUOUS
Status: ACTIVE | OUTPATIENT
Start: 2020-01-01 | End: 2020-01-01

## 2020-01-01 RX ORDER — MILRINONE LACTATE 0.2 MG/ML
0.5 INJECTION, SOLUTION INTRAVENOUS CONTINUOUS
Status: DISCONTINUED | OUTPATIENT
Start: 2020-01-01 | End: 2020-06-08 | Stop reason: HOSPADM

## 2020-01-01 RX ORDER — ACETAMINOPHEN 500 MG
1000 TABLET ORAL ONCE
Status: COMPLETED | OUTPATIENT
Start: 2020-01-01 | End: 2020-01-01

## 2020-01-01 RX ORDER — BUDESONIDE 0.5 MG/2ML
0.5 INHALANT ORAL 2 TIMES DAILY
Status: DISCONTINUED | OUTPATIENT
Start: 2020-01-01 | End: 2020-06-08 | Stop reason: HOSPADM

## 2020-01-01 RX ORDER — 0.9 % SODIUM CHLORIDE 0.9 %
20 INTRAVENOUS SOLUTION INTRAVENOUS ONCE
Status: COMPLETED | OUTPATIENT
Start: 2020-01-01 | End: 2020-01-01

## 2020-01-01 RX ORDER — CALCIUM CHLORIDE 100 MG/ML
1 INJECTION INTRAVENOUS; INTRAVENTRICULAR ONCE
Status: COMPLETED | OUTPATIENT
Start: 2020-01-01 | End: 2020-01-01

## 2020-01-01 RX ORDER — BUDESONIDE AND FORMOTEROL FUMARATE DIHYDRATE 80; 4.5 UG/1; UG/1
2 AEROSOL RESPIRATORY (INHALATION) 2 TIMES DAILY
Status: DISCONTINUED | OUTPATIENT
Start: 2020-01-01 | End: 2020-01-01 | Stop reason: CLARIF

## 2020-01-01 RX ORDER — SODIUM CHLORIDE 0.9 % (FLUSH) 0.9 %
10 SYRINGE (ML) INJECTION EVERY 12 HOURS SCHEDULED
Status: DISCONTINUED | OUTPATIENT
Start: 2020-01-01 | End: 2020-01-01 | Stop reason: SDUPTHER

## 2020-01-01 RX ORDER — ARFORMOTEROL TARTRATE 15 UG/2ML
15 SOLUTION RESPIRATORY (INHALATION) 2 TIMES DAILY
Status: DISCONTINUED | OUTPATIENT
Start: 2020-01-01 | End: 2020-01-01 | Stop reason: HOSPADM

## 2020-01-01 RX ORDER — FENTANYL CITRATE 50 UG/ML
INJECTION, SOLUTION INTRAMUSCULAR; INTRAVENOUS PRN
Status: DISCONTINUED | OUTPATIENT
Start: 2020-01-01 | End: 2020-01-01 | Stop reason: SDUPTHER

## 2020-01-01 RX ORDER — LIDOCAINE HYDROCHLORIDE ANHYDROUS AND DEXTROSE MONOHYDRATE .4; 5 G/100ML; G/100ML
INJECTION, SOLUTION INTRAVENOUS CONTINUOUS PRN
Status: DISCONTINUED | OUTPATIENT
Start: 2020-01-01 | End: 2020-01-01 | Stop reason: SDUPTHER

## 2020-01-01 RX ORDER — GABAPENTIN 100 MG/1
100 CAPSULE ORAL 2 TIMES DAILY
Status: DISCONTINUED | OUTPATIENT
Start: 2020-01-01 | End: 2020-01-01 | Stop reason: HOSPADM

## 2020-01-01 RX ORDER — HEPARIN SODIUM 10000 [USP'U]/100ML
500 INJECTION, SOLUTION INTRAVENOUS CONTINUOUS PRN
Status: DISCONTINUED | OUTPATIENT
Start: 2020-01-01 | End: 2020-06-08 | Stop reason: HOSPADM

## 2020-01-01 RX ORDER — IPRATROPIUM BROMIDE AND ALBUTEROL SULFATE 2.5; .5 MG/3ML; MG/3ML
1 SOLUTION RESPIRATORY (INHALATION)
Status: COMPLETED | OUTPATIENT
Start: 2020-01-01 | End: 2020-01-01

## 2020-01-01 RX ORDER — PROPOFOL 10 MG/ML
10 INJECTION, EMULSION INTRAVENOUS CONTINUOUS PRN
Status: DISCONTINUED | OUTPATIENT
Start: 2020-01-01 | End: 2020-06-08 | Stop reason: HOSPADM

## 2020-01-01 RX ORDER — BUDESONIDE AND FORMOTEROL FUMARATE DIHYDRATE 160; 4.5 UG/1; UG/1
2 AEROSOL RESPIRATORY (INHALATION) 2 TIMES DAILY
Status: DISCONTINUED | OUTPATIENT
Start: 2020-01-01 | End: 2020-01-01 | Stop reason: CLARIF

## 2020-01-01 RX ORDER — AMINOCAPROIC ACID 250 MG/ML
INJECTION, SOLUTION INTRAVENOUS PRN
Status: DISCONTINUED | OUTPATIENT
Start: 2020-01-01 | End: 2020-01-01 | Stop reason: SDUPTHER

## 2020-01-01 RX ORDER — ACETAMINOPHEN 325 MG/1
650 TABLET ORAL EVERY 4 HOURS PRN
Status: DISCONTINUED | OUTPATIENT
Start: 2020-01-01 | End: 2020-01-01 | Stop reason: SDUPTHER

## 2020-01-01 RX ORDER — BUDESONIDE 0.25 MG/2ML
0.25 INHALANT ORAL 2 TIMES DAILY
Status: DISCONTINUED | OUTPATIENT
Start: 2020-01-01 | End: 2020-01-01 | Stop reason: HOSPADM

## 2020-01-01 RX ORDER — LIDOCAINE HYDROCHLORIDE ANHYDROUS AND DEXTROSE MONOHYDRATE .4; 5 G/100ML; G/100ML
1 INJECTION, SOLUTION INTRAVENOUS CONTINUOUS
Status: DISCONTINUED | OUTPATIENT
Start: 2020-01-01 | End: 2020-01-01

## 2020-01-01 RX ORDER — FLUTICASONE PROPIONATE 50 MCG
2 SPRAY, SUSPENSION (ML) NASAL DAILY
Status: DISCONTINUED | OUTPATIENT
Start: 2020-01-01 | End: 2020-01-01

## 2020-01-01 RX ORDER — FENTANYL CITRATE 0.05 MG/ML
INJECTION, SOLUTION INTRAMUSCULAR; INTRAVENOUS PRN
Status: DISCONTINUED | OUTPATIENT
Start: 2020-01-01 | End: 2020-01-01 | Stop reason: SDUPTHER

## 2020-01-01 RX ORDER — ATORVASTATIN CALCIUM 40 MG/1
40 TABLET, FILM COATED ORAL NIGHTLY
Status: DISCONTINUED | OUTPATIENT
Start: 2020-01-01 | End: 2020-01-01

## 2020-01-01 RX ORDER — CEFAZOLIN SODIUM 1 G/3ML
INJECTION, POWDER, FOR SOLUTION INTRAMUSCULAR; INTRAVENOUS PRN
Status: DISCONTINUED | OUTPATIENT
Start: 2020-01-01 | End: 2020-01-01 | Stop reason: SDUPTHER

## 2020-01-01 RX ORDER — CETIRIZINE HYDROCHLORIDE 10 MG/1
10 TABLET ORAL DAILY
Status: DISCONTINUED | OUTPATIENT
Start: 2020-01-01 | End: 2020-01-01 | Stop reason: HOSPADM

## 2020-01-01 RX ORDER — LORAZEPAM 0.5 MG/1
0.5 TABLET ORAL ONCE
Status: DISCONTINUED | OUTPATIENT
Start: 2020-01-01 | End: 2020-01-01

## 2020-01-01 RX ORDER — MAGNESIUM HYDROXIDE/ALUMINUM HYDROXICE/SIMETHICONE 120; 1200; 1200 MG/30ML; MG/30ML; MG/30ML
30 SUSPENSION ORAL EVERY 4 HOURS PRN
Status: DISCONTINUED | OUTPATIENT
Start: 2020-01-01 | End: 2020-06-08 | Stop reason: HOSPADM

## 2020-01-01 RX ORDER — SODIUM CHLORIDE 9 MG/ML
INJECTION, SOLUTION INTRAVENOUS CONTINUOUS
Status: DISCONTINUED | OUTPATIENT
Start: 2020-01-01 | End: 2020-01-01

## 2020-01-01 RX ORDER — SODIUM CHLORIDE, SODIUM LACTATE, POTASSIUM CHLORIDE, CALCIUM CHLORIDE 600; 310; 30; 20 MG/100ML; MG/100ML; MG/100ML; MG/100ML
INJECTION, SOLUTION INTRAVENOUS CONTINUOUS PRN
Status: DISCONTINUED | OUTPATIENT
Start: 2020-01-01 | End: 2020-01-01 | Stop reason: SDUPTHER

## 2020-01-01 RX ORDER — SODIUM CHLORIDE 0.9 % (FLUSH) 0.9 %
10 SYRINGE (ML) INJECTION EVERY 12 HOURS SCHEDULED
Status: DISCONTINUED | OUTPATIENT
Start: 2020-01-01 | End: 2020-06-08 | Stop reason: HOSPADM

## 2020-01-01 RX ORDER — LACTOBACILLUS RHAMNOSUS GG 10B CELL
1 CAPSULE ORAL DAILY
Status: DISCONTINUED | OUTPATIENT
Start: 2020-01-01 | End: 2020-01-01 | Stop reason: HOSPADM

## 2020-01-01 RX ORDER — ALBUMIN (HUMAN) 12.5 G/50ML
25 SOLUTION INTRAVENOUS ONCE
Status: DISCONTINUED | OUTPATIENT
Start: 2020-01-01 | End: 2020-01-01

## 2020-01-01 RX ORDER — HEPARIN SODIUM 10000 [USP'U]/100ML
12 INJECTION, SOLUTION INTRAVENOUS CONTINUOUS
Status: DISCONTINUED | OUTPATIENT
Start: 2020-01-01 | End: 2020-01-01 | Stop reason: ALTCHOICE

## 2020-01-01 RX ORDER — SODIUM CHLORIDE 9 MG/ML
30 INJECTION, SOLUTION INTRAVENOUS CONTINUOUS
Status: DISCONTINUED | OUTPATIENT
Start: 2020-01-01 | End: 2020-06-08 | Stop reason: HOSPADM

## 2020-01-01 RX ORDER — ALBUTEROL SULFATE 0.63 MG/3ML
0.63 SOLUTION RESPIRATORY (INHALATION) EVERY 6 HOURS PRN
Status: DISCONTINUED | OUTPATIENT
Start: 2020-01-01 | End: 2020-01-01

## 2020-01-01 RX ORDER — ARFORMOTEROL TARTRATE 15 UG/2ML
15 SOLUTION RESPIRATORY (INHALATION) 2 TIMES DAILY
Status: DISCONTINUED | OUTPATIENT
Start: 2020-01-01 | End: 2020-06-08 | Stop reason: HOSPADM

## 2020-01-01 RX ORDER — NITROGLYCERIN 20 MG/100ML
10 INJECTION INTRAVENOUS CONTINUOUS PRN
Status: DISCONTINUED | OUTPATIENT
Start: 2020-01-01 | End: 2020-06-08 | Stop reason: HOSPADM

## 2020-01-01 RX ORDER — LORAZEPAM 0.5 MG/1
0.25 TABLET ORAL ONCE
Status: COMPLETED | OUTPATIENT
Start: 2020-01-01 | End: 2020-01-01

## 2020-01-01 RX ORDER — MELOXICAM 7.5 MG/1
7.5 TABLET ORAL DAILY
Status: DISCONTINUED | OUTPATIENT
Start: 2020-01-01 | End: 2020-01-01 | Stop reason: HOSPADM

## 2020-01-01 RX ORDER — INSULIN GLARGINE 100 [IU]/ML
0.15 INJECTION, SOLUTION SUBCUTANEOUS NIGHTLY
Status: DISCONTINUED | OUTPATIENT
Start: 2020-01-01 | End: 2020-01-01

## 2020-01-01 RX ORDER — 0.9 % SODIUM CHLORIDE 0.9 %
250 INTRAVENOUS SOLUTION INTRAVENOUS CONTINUOUS PRN
Status: DISCONTINUED | OUTPATIENT
Start: 2020-01-01 | End: 2020-06-08 | Stop reason: HOSPADM

## 2020-01-01 RX ORDER — ALBUMIN, HUMAN INJ 5% 5 %
SOLUTION INTRAVENOUS PRN
Status: DISCONTINUED | OUTPATIENT
Start: 2020-01-01 | End: 2020-01-01 | Stop reason: SDUPTHER

## 2020-01-01 RX ORDER — SODIUM CHLORIDE 0.9 % (FLUSH) 0.9 %
10 SYRINGE (ML) INJECTION EVERY 12 HOURS SCHEDULED
Status: DISCONTINUED | OUTPATIENT
Start: 2020-01-01 | End: 2020-01-01

## 2020-01-01 RX ORDER — AMLODIPINE BESYLATE 5 MG/1
5 TABLET ORAL DAILY
Status: DISCONTINUED | OUTPATIENT
Start: 2020-01-01 | End: 2020-01-01

## 2020-01-01 RX ORDER — CHLORHEXIDINE GLUCONATE 4 G/100ML
SOLUTION TOPICAL SEE ADMIN INSTRUCTIONS
Status: DISCONTINUED | OUTPATIENT
Start: 2020-01-01 | End: 2020-01-01

## 2020-01-01 RX ORDER — PRAVASTATIN SODIUM 20 MG
20 TABLET ORAL NIGHTLY
Status: DISCONTINUED | OUTPATIENT
Start: 2020-01-01 | End: 2020-01-01 | Stop reason: HOSPADM

## 2020-01-01 RX ORDER — AMIODARONE HYDROCHLORIDE 50 MG/ML
INJECTION, SOLUTION INTRAVENOUS PRN
Status: DISCONTINUED | OUTPATIENT
Start: 2020-01-01 | End: 2020-01-01 | Stop reason: SDUPTHER

## 2020-01-01 RX ORDER — PANTOPRAZOLE SODIUM 40 MG/10ML
40 INJECTION, POWDER, LYOPHILIZED, FOR SOLUTION INTRAVENOUS DAILY
Status: DISCONTINUED | OUTPATIENT
Start: 2020-01-01 | End: 2020-01-01

## 2020-01-01 RX ORDER — LEVOTHYROXINE SODIUM 0.03 MG/1
25 TABLET ORAL DAILY
Status: DISCONTINUED | OUTPATIENT
Start: 2020-01-01 | End: 2020-01-01

## 2020-01-01 RX ORDER — LEVOTHYROXINE SODIUM ANHYDROUS 100 UG/5ML
12.5 INJECTION, POWDER, LYOPHILIZED, FOR SOLUTION INTRAVENOUS DAILY
Status: DISCONTINUED | OUTPATIENT
Start: 2020-06-10 | End: 2020-06-08 | Stop reason: HOSPADM

## 2020-01-01 RX ORDER — LEVOTHYROXINE SODIUM 0.03 MG/1
25 TABLET ORAL DAILY
Status: DISCONTINUED | OUTPATIENT
Start: 2020-01-01 | End: 2020-01-01 | Stop reason: HOSPADM

## 2020-01-01 RX ORDER — GUAIFENESIN 600 MG/1
1200 TABLET, EXTENDED RELEASE ORAL 2 TIMES DAILY PRN
Qty: 60 TABLET | Refills: 0 | Status: SHIPPED | OUTPATIENT
Start: 2020-01-01 | End: 2020-06-19

## 2020-01-01 RX ORDER — CEFAZOLIN SODIUM 2 G/50ML
2 SOLUTION INTRAVENOUS SEE ADMIN INSTRUCTIONS
Status: DISCONTINUED | OUTPATIENT
Start: 2020-01-01 | End: 2020-01-01

## 2020-01-01 RX ORDER — CALCIUM CHLORIDE 100 MG/ML
INJECTION INTRAVENOUS; INTRAVENTRICULAR PRN
Status: DISCONTINUED | OUTPATIENT
Start: 2020-01-01 | End: 2020-01-01 | Stop reason: SDUPTHER

## 2020-01-01 RX ORDER — ACETAMINOPHEN 325 MG/1
650 TABLET ORAL EVERY 4 HOURS PRN
Status: DISCONTINUED | OUTPATIENT
Start: 2020-01-01 | End: 2020-06-08 | Stop reason: HOSPADM

## 2020-01-01 RX ORDER — MEPERIDINE HYDROCHLORIDE 25 MG/ML
25 INJECTION INTRAMUSCULAR; INTRAVENOUS; SUBCUTANEOUS
Status: ACTIVE | OUTPATIENT
Start: 2020-01-01 | End: 2020-01-01

## 2020-01-01 RX ORDER — LIDOCAINE HYDROCHLORIDE 20 MG/ML
INJECTION, SOLUTION INTRAVENOUS PRN
Status: DISCONTINUED | OUTPATIENT
Start: 2020-01-01 | End: 2020-01-01 | Stop reason: SDUPTHER

## 2020-01-01 RX ORDER — ALBUTEROL SULFATE 90 UG/1
2 AEROSOL, METERED RESPIRATORY (INHALATION) EVERY 6 HOURS PRN
Qty: 1 INHALER | Refills: 0 | Status: SHIPPED | OUTPATIENT
Start: 2020-01-01

## 2020-01-01 RX ORDER — AMLODIPINE BESYLATE 10 MG/1
10 TABLET ORAL DAILY
Status: DISCONTINUED | OUTPATIENT
Start: 2020-01-01 | End: 2020-01-01

## 2020-01-01 RX ORDER — ONDANSETRON 2 MG/ML
4 INJECTION INTRAMUSCULAR; INTRAVENOUS EVERY 8 HOURS PRN
Status: DISCONTINUED | OUTPATIENT
Start: 2020-01-01 | End: 2020-06-08 | Stop reason: HOSPADM

## 2020-01-01 RX ORDER — CHLORHEXIDINE GLUCONATE 0.12 MG/ML
15 RINSE ORAL 2 TIMES DAILY
Status: DISCONTINUED | OUTPATIENT
Start: 2020-01-01 | End: 2020-06-08 | Stop reason: HOSPADM

## 2020-01-01 RX ORDER — PANTOPRAZOLE SODIUM 40 MG/1
40 TABLET, DELAYED RELEASE ORAL
Status: DISCONTINUED | OUTPATIENT
Start: 2020-01-01 | End: 2020-01-01

## 2020-01-01 RX ORDER — EPINEPHRINE 1 MG/ML
INJECTION, SOLUTION, CONCENTRATE INTRAVENOUS PRN
Status: DISCONTINUED | OUTPATIENT
Start: 2020-01-01 | End: 2020-01-01 | Stop reason: SDUPTHER

## 2020-01-01 RX ORDER — HYDRALAZINE HYDROCHLORIDE 50 MG/1
50 TABLET, FILM COATED ORAL EVERY 8 HOURS SCHEDULED
Status: DISCONTINUED | OUTPATIENT
Start: 2020-01-01 | End: 2020-01-01 | Stop reason: HOSPADM

## 2020-01-01 RX ORDER — PROPOFOL 10 MG/ML
INJECTION, EMULSION INTRAVENOUS
Status: DISPENSED
Start: 2020-01-01 | End: 2020-01-01

## 2020-01-01 RX ORDER — ALBUMIN, HUMAN INJ 5% 5 %
SOLUTION INTRAVENOUS
Status: DISCONTINUED
Start: 2020-01-01 | End: 2020-06-08 | Stop reason: HOSPADM

## 2020-01-01 RX ORDER — CHLORHEXIDINE GLUCONATE 0.12 MG/ML
15 RINSE ORAL ONCE
Status: COMPLETED | OUTPATIENT
Start: 2020-01-01 | End: 2020-01-01

## 2020-01-01 RX ORDER — IPRATROPIUM BROMIDE AND ALBUTEROL SULFATE 2.5; .5 MG/3ML; MG/3ML
1 SOLUTION RESPIRATORY (INHALATION) ONCE
Status: COMPLETED | OUTPATIENT
Start: 2020-01-01 | End: 2020-01-01

## 2020-01-01 RX ORDER — VASOPRESSIN 20 U/ML
INJECTION PARENTERAL PRN
Status: DISCONTINUED | OUTPATIENT
Start: 2020-01-01 | End: 2020-01-01 | Stop reason: SDUPTHER

## 2020-01-01 RX ORDER — CLONAZEPAM 0.5 MG/1
0.25 TABLET ORAL 2 TIMES DAILY PRN
Status: DISCONTINUED | OUTPATIENT
Start: 2020-01-01 | End: 2020-01-01

## 2020-01-01 RX ORDER — PREDNISONE 10 MG/1
TABLET ORAL
Qty: 30 TABLET | Refills: 0 | Status: SHIPPED | OUTPATIENT
Start: 2020-01-01 | End: 2020-01-01

## 2020-01-01 RX ORDER — GUAIFENESIN 400 MG/1
400 TABLET ORAL 3 TIMES DAILY
Status: DISCONTINUED | OUTPATIENT
Start: 2020-01-01 | End: 2020-01-01

## 2020-01-01 RX ORDER — MIDAZOLAM HYDROCHLORIDE 1 MG/ML
INJECTION INTRAMUSCULAR; INTRAVENOUS PRN
Status: DISCONTINUED | OUTPATIENT
Start: 2020-01-01 | End: 2020-01-01 | Stop reason: SDUPTHER

## 2020-01-01 RX ORDER — MAGNESIUM SULFATE HEPTAHYDRATE 500 MG/ML
INJECTION, SOLUTION INTRAMUSCULAR; INTRAVENOUS PRN
Status: DISCONTINUED | OUTPATIENT
Start: 2020-01-01 | End: 2020-01-01 | Stop reason: SDUPTHER

## 2020-01-01 RX ORDER — PANTOPRAZOLE SODIUM 40 MG/1
40 TABLET, DELAYED RELEASE ORAL
Status: DISCONTINUED | OUTPATIENT
Start: 2020-01-01 | End: 2020-01-01 | Stop reason: HOSPADM

## 2020-01-01 RX ORDER — VASOPRESSIN 20 U/ML
INJECTION PARENTERAL CONTINUOUS PRN
Status: DISCONTINUED | OUTPATIENT
Start: 2020-01-01 | End: 2020-01-01

## 2020-01-01 RX ORDER — PROTAMINE SULFATE 10 MG/ML
INJECTION, SOLUTION INTRAVENOUS PRN
Status: DISCONTINUED | OUTPATIENT
Start: 2020-01-01 | End: 2020-01-01 | Stop reason: SDUPTHER

## 2020-01-01 RX ORDER — ASPIRIN 81 MG/1
241 TABLET, CHEWABLE ORAL ONCE
Status: COMPLETED | OUTPATIENT
Start: 2020-01-01 | End: 2020-01-01

## 2020-01-01 RX ORDER — OXYCODONE HYDROCHLORIDE 5 MG/1
5 TABLET ORAL EVERY 4 HOURS PRN
Status: DISCONTINUED | OUTPATIENT
Start: 2020-01-01 | End: 2020-06-08 | Stop reason: HOSPADM

## 2020-01-01 RX ORDER — MONTELUKAST SODIUM 10 MG/1
10 TABLET ORAL NIGHTLY
Status: DISCONTINUED | OUTPATIENT
Start: 2020-01-01 | End: 2020-06-08 | Stop reason: HOSPADM

## 2020-01-01 RX ORDER — ROCURONIUM BROMIDE 10 MG/ML
30 INJECTION, SOLUTION INTRAVENOUS ONCE
Status: COMPLETED | OUTPATIENT
Start: 2020-01-01 | End: 2020-01-01

## 2020-01-01 RX ORDER — HYDRALAZINE HYDROCHLORIDE 50 MG/1
50 TABLET, FILM COATED ORAL EVERY 8 HOURS SCHEDULED
Qty: 90 TABLET | Refills: 3 | Status: SHIPPED | OUTPATIENT
Start: 2020-01-01

## 2020-01-01 RX ADMIN — ATORVASTATIN CALCIUM 40 MG: 40 TABLET, FILM COATED ORAL at 20:02

## 2020-01-01 RX ADMIN — Medication 10 MCG: at 09:01

## 2020-01-01 RX ADMIN — ARFORMOTEROL TARTRATE 15 MCG: 15 SOLUTION RESPIRATORY (INHALATION) at 19:31

## 2020-01-01 RX ADMIN — IPRATROPIUM BROMIDE AND ALBUTEROL SULFATE 1 AMPULE: 2.5; .5 SOLUTION RESPIRATORY (INHALATION) at 16:35

## 2020-01-01 RX ADMIN — FENTANYL CITRATE 250 MCG: 50 INJECTION, SOLUTION INTRAMUSCULAR; INTRAVENOUS at 08:29

## 2020-01-01 RX ADMIN — BUDESONIDE 500 MCG: 0.5 SUSPENSION RESPIRATORY (INHALATION) at 22:14

## 2020-01-01 RX ADMIN — MONTELUKAST 10 MG: 10 TABLET, FILM COATED ORAL at 00:16

## 2020-01-01 RX ADMIN — BUDESONIDE 500 MCG: 0.5 SUSPENSION RESPIRATORY (INHALATION) at 20:46

## 2020-01-01 RX ADMIN — MUPIROCIN: 20 OINTMENT TOPICAL at 21:14

## 2020-01-01 RX ADMIN — SODIUM CHLORIDE 25 ML: 9 INJECTION, SOLUTION INTRAVENOUS at 06:02

## 2020-01-01 RX ADMIN — SODIUM CHLORIDE, PRESERVATIVE FREE 10 ML: 5 INJECTION INTRAVENOUS at 21:46

## 2020-01-01 RX ADMIN — CHLORHEXIDINE GLUCONATE 15 ML: 1.2 RINSE ORAL at 09:41

## 2020-01-01 RX ADMIN — FLUTICASONE PROPIONATE 2 SPRAY: 50 SPRAY, METERED NASAL at 08:17

## 2020-01-01 RX ADMIN — NITROGLYCERIN 50 MCG: 5 INJECTION, SOLUTION INTRAVENOUS at 12:05

## 2020-01-01 RX ADMIN — PANTOPRAZOLE SODIUM 40 MG: 40 INJECTION, POWDER, FOR SOLUTION INTRAVENOUS at 09:54

## 2020-01-01 RX ADMIN — INSULIN LISPRO 3 UNITS: 100 INJECTION, SOLUTION INTRAVENOUS; SUBCUTANEOUS at 12:15

## 2020-01-01 RX ADMIN — PHENYLEPHRINE HYDROCHLORIDE 100 MCG: 10 INJECTION INTRAVENOUS at 08:14

## 2020-01-01 RX ADMIN — SODIUM CHLORIDE: 9 INJECTION, SOLUTION INTRAVENOUS at 08:18

## 2020-01-01 RX ADMIN — HYDRALAZINE HYDROCHLORIDE 100 MG: 50 TABLET, FILM COATED ORAL at 06:50

## 2020-01-01 RX ADMIN — CALCIUM GLUCONATE 1 G: 98 INJECTION, SOLUTION INTRAVENOUS at 18:51

## 2020-01-01 RX ADMIN — CHLORHEXIDINE GLUCONATE 15 ML: 1.2 RINSE ORAL at 20:55

## 2020-01-01 RX ADMIN — LIDOCAINE HYDROCHLORIDE 2 MG/MIN: 4 INJECTION INTRAVENOUS at 15:00

## 2020-01-01 RX ADMIN — GABAPENTIN 100 MG: 100 CAPSULE ORAL at 08:39

## 2020-01-01 RX ADMIN — EPOPROSTENOL 50 NG/KG/MIN: 1.5 INJECTION, POWDER, LYOPHILIZED, FOR SOLUTION INTRAVENOUS at 14:00

## 2020-01-01 RX ADMIN — INSULIN LISPRO 6 UNITS: 100 INJECTION, SOLUTION INTRAVENOUS; SUBCUTANEOUS at 08:30

## 2020-01-01 RX ADMIN — SODIUM BICARBONATE 50 MEQ: 84 INJECTION INTRAVENOUS at 13:24

## 2020-01-01 RX ADMIN — BUDESONIDE 250 MCG: 0.25 SUSPENSION RESPIRATORY (INHALATION) at 08:06

## 2020-01-01 RX ADMIN — AMIODARONE HYDROCHLORIDE 0.5 MG/MIN: 50 INJECTION, SOLUTION INTRAVENOUS at 00:43

## 2020-01-01 RX ADMIN — ALBUMIN (HUMAN) 12.5 G: 12.5 INJECTION, SOLUTION INTRAVENOUS at 14:08

## 2020-01-01 RX ADMIN — SODIUM CHLORIDE 20 ML: 9 INJECTION, SOLUTION INTRAVENOUS at 20:19

## 2020-01-01 RX ADMIN — POTASSIUM CHLORIDE 40 MEQ: 1500 TABLET, EXTENDED RELEASE ORAL at 14:07

## 2020-01-01 RX ADMIN — ROCURONIUM BROMIDE 30 MG: 50 INJECTION INTRAVENOUS at 12:27

## 2020-01-01 RX ADMIN — VASOPRESSIN 0.06 UNITS/MIN: 20 INJECTION INTRAVENOUS at 15:00

## 2020-01-01 RX ADMIN — SODIUM CHLORIDE, PRESERVATIVE FREE 10 ML: 5 INJECTION INTRAVENOUS at 09:55

## 2020-01-01 RX ADMIN — BUDESONIDE 500 MCG: 0.5 SUSPENSION RESPIRATORY (INHALATION) at 08:17

## 2020-01-01 RX ADMIN — SODIUM CHLORIDE: 9 INJECTION, SOLUTION INTRAVENOUS at 09:26

## 2020-01-01 RX ADMIN — Medication 35 MILLICURIE: at 11:36

## 2020-01-01 RX ADMIN — PANTOPRAZOLE SODIUM 40 MG: 40 TABLET, DELAYED RELEASE ORAL at 06:47

## 2020-01-01 RX ADMIN — CALCIUM GLUCONATE 1 G: 98 INJECTION, SOLUTION INTRAVENOUS at 09:16

## 2020-01-01 RX ADMIN — HYDROCORTISONE SODIUM SUCCINATE 100 MG: 100 INJECTION, POWDER, FOR SOLUTION INTRAMUSCULAR; INTRAVENOUS at 05:38

## 2020-01-01 RX ADMIN — IPRATROPIUM BROMIDE AND ALBUTEROL SULFATE 1 AMPULE: .5; 3 SOLUTION RESPIRATORY (INHALATION) at 03:28

## 2020-01-01 RX ADMIN — SODIUM CHLORIDE: 9 INJECTION, SOLUTION INTRAVENOUS at 15:36

## 2020-01-01 RX ADMIN — VASOPRESSIN 0.05 UNITS/MIN: 20 INJECTION INTRAVENOUS at 00:34

## 2020-01-01 RX ADMIN — CALCIUM GLUCONATE 1 G: 98 INJECTION, SOLUTION INTRAVENOUS at 05:17

## 2020-01-01 RX ADMIN — ATORVASTATIN CALCIUM 40 MG: 40 TABLET, FILM COATED ORAL at 20:10

## 2020-01-01 RX ADMIN — EPINEPHRINE 1 MG: 0.1 INJECTION INTRACARDIAC; INTRAVENOUS at 13:12

## 2020-01-01 RX ADMIN — HEPARIN SODIUM 25000 UNITS: 10000 INJECTION, SOLUTION INTRAVENOUS; SUBCUTANEOUS at 08:12

## 2020-01-01 RX ADMIN — HEPARIN SODIUM 300 UNITS/HR: 10000 INJECTION, SOLUTION INTRAVENOUS at 13:29

## 2020-01-01 RX ADMIN — BUDESONIDE 500 MCG: 0.5 SUSPENSION RESPIRATORY (INHALATION) at 19:47

## 2020-01-01 RX ADMIN — IPRATROPIUM BROMIDE AND ALBUTEROL SULFATE 1 AMPULE: 2.5; .5 SOLUTION RESPIRATORY (INHALATION) at 08:02

## 2020-01-01 RX ADMIN — HYDRALAZINE HYDROCHLORIDE 100 MG: 50 TABLET, FILM COATED ORAL at 21:53

## 2020-01-01 RX ADMIN — ACETAMINOPHEN 650 MG: 325 TABLET, FILM COATED ORAL at 16:44

## 2020-01-01 RX ADMIN — EPINEPHRINE 30 MCG: 1 INJECTION, SOLUTION INTRAMUSCULAR; SUBCUTANEOUS at 11:19

## 2020-01-01 RX ADMIN — SODIUM CHLORIDE 5 MG/HR: 900 INJECTION, SOLUTION INTRAVENOUS at 20:25

## 2020-01-01 RX ADMIN — MUPIROCIN: 20 OINTMENT TOPICAL at 20:38

## 2020-01-01 RX ADMIN — FLUTICASONE PROPIONATE 2 SPRAY: 50 SPRAY, METERED NASAL at 09:26

## 2020-01-01 RX ADMIN — MAGNESIUM GLUCONATE 500 MG ORAL TABLET 400 MG: 500 TABLET ORAL at 09:20

## 2020-01-01 RX ADMIN — PANTOPRAZOLE SODIUM 40 MG: 40 TABLET, DELAYED RELEASE ORAL at 06:31

## 2020-01-01 RX ADMIN — HYDRALAZINE HYDROCHLORIDE 100 MG: 50 TABLET, FILM COATED ORAL at 06:32

## 2020-01-01 RX ADMIN — ACETAMINOPHEN 1000 MG: 500 TABLET ORAL at 03:32

## 2020-01-01 RX ADMIN — MONTELUKAST SODIUM 10 MG: 10 TABLET, FILM COATED ORAL at 00:16

## 2020-01-01 RX ADMIN — VECURONIUM BROMIDE 10 MG: 10 INJECTION, POWDER, LYOPHILIZED, FOR SOLUTION INTRAVENOUS at 08:12

## 2020-01-01 RX ADMIN — MAGNESIUM GLUCONATE 500 MG ORAL TABLET 400 MG: 500 TABLET ORAL at 09:47

## 2020-01-01 RX ADMIN — METOPROLOL TARTRATE 25 MG: 25 TABLET, FILM COATED ORAL at 20:03

## 2020-01-01 RX ADMIN — MIDAZOLAM 2 MG: 1 INJECTION INTRAMUSCULAR; INTRAVENOUS at 06:46

## 2020-01-01 RX ADMIN — INSULIN LISPRO 6 UNITS: 100 INJECTION, SOLUTION INTRAVENOUS; SUBCUTANEOUS at 20:06

## 2020-01-01 RX ADMIN — EPOPROSTENOL 50 NG/KG/MIN: 1.5 INJECTION, POWDER, LYOPHILIZED, FOR SOLUTION INTRAVENOUS at 19:59

## 2020-01-01 RX ADMIN — MINERAL OIL AND PETROLATUM: 150; 830 OINTMENT OPHTHALMIC at 09:20

## 2020-01-01 RX ADMIN — ENOXAPARIN SODIUM 60 MG: 60 INJECTION SUBCUTANEOUS at 19:08

## 2020-01-01 RX ADMIN — LEVOTHYROXINE SODIUM 25 MCG: 0.03 TABLET ORAL at 06:31

## 2020-01-01 RX ADMIN — SODIUM CHLORIDE, PRESERVATIVE FREE 10 ML: 5 INJECTION INTRAVENOUS at 21:26

## 2020-01-01 RX ADMIN — IPRATROPIUM BROMIDE AND ALBUTEROL SULFATE 1 AMPULE: 2.5; .5 SOLUTION RESPIRATORY (INHALATION) at 12:27

## 2020-01-01 RX ADMIN — VECURONIUM BROMIDE 20 MG: 10 INJECTION, POWDER, LYOPHILIZED, FOR SOLUTION INTRAVENOUS at 06:59

## 2020-01-01 RX ADMIN — SODIUM CHLORIDE 12.2 UNITS/HR: 9 INJECTION, SOLUTION INTRAVENOUS at 22:19

## 2020-01-01 RX ADMIN — PROPOFOL INJECTABLE EMULSION 20 MCG/KG/MIN: 10 INJECTION, EMULSION INTRAVENOUS at 09:10

## 2020-01-01 RX ADMIN — CEFAZOLIN 2000 MG: 1 INJECTION, POWDER, FOR SOLUTION INTRAMUSCULAR; INTRAVENOUS at 08:52

## 2020-01-01 RX ADMIN — MILRINONE LACTATE IN DEXTROSE 0.25 MCG/KG/MIN: 200 INJECTION, SOLUTION INTRAVENOUS at 15:00

## 2020-01-01 RX ADMIN — ENOXAPARIN SODIUM 60 MG: 60 INJECTION SUBCUTANEOUS at 09:25

## 2020-01-01 RX ADMIN — Medication 10 ML: at 02:47

## 2020-01-01 RX ADMIN — ROCURONIUM BROMIDE 50 MG: 10 INJECTION, SOLUTION INTRAVENOUS at 12:03

## 2020-01-01 RX ADMIN — CLONAZEPAM 0.25 MG: 0.5 TABLET ORAL at 03:24

## 2020-01-01 RX ADMIN — ARFORMOTEROL TARTRATE 15 MCG: 15 SOLUTION RESPIRATORY (INHALATION) at 08:17

## 2020-01-01 RX ADMIN — Medication 10 MCG: at 08:37

## 2020-01-01 RX ADMIN — CEFAZOLIN SODIUM 2 G: 2 SOLUTION INTRAVENOUS at 06:59

## 2020-01-01 RX ADMIN — DILTIAZEM HYDROCHLORIDE 240 MG: 240 CAPSULE, EXTENDED RELEASE ORAL at 09:08

## 2020-01-01 RX ADMIN — ARFORMOTEROL TARTRATE 15 MCG: 15 SOLUTION RESPIRATORY (INHALATION) at 19:46

## 2020-01-01 RX ADMIN — SODIUM CHLORIDE 1000 ML: 9 INJECTION, SOLUTION INTRAVENOUS at 03:34

## 2020-01-01 RX ADMIN — BUDESONIDE 500 MCG: 0.5 SUSPENSION RESPIRATORY (INHALATION) at 08:31

## 2020-01-01 RX ADMIN — ARFORMOTEROL TARTRATE 15 MCG: 15 SOLUTION RESPIRATORY (INHALATION) at 09:19

## 2020-01-01 RX ADMIN — SODIUM CHLORIDE 5 MG/HR: 900 INJECTION, SOLUTION INTRAVENOUS at 17:15

## 2020-01-01 RX ADMIN — MILRINONE LACTATE IN DEXTROSE 0.5 MCG/KG/MIN: 200 INJECTION, SOLUTION INTRAVENOUS at 20:01

## 2020-01-01 RX ADMIN — POTASSIUM CHLORIDE 20 MEQ: 400 INJECTION, SOLUTION INTRAVENOUS at 18:19

## 2020-01-01 RX ADMIN — SODIUM CHLORIDE, PRESERVATIVE FREE 10 ML: 5 INJECTION INTRAVENOUS at 09:41

## 2020-01-01 RX ADMIN — CALCIUM CHLORIDE 1 G: 100 INJECTION, SOLUTION INTRAVENOUS; INTRAVENTRICULAR at 13:16

## 2020-01-01 RX ADMIN — ALBUMIN (HUMAN) 500 ML: 12.5 INJECTION, SOLUTION INTRAVENOUS at 14:06

## 2020-01-01 RX ADMIN — IPRATROPIUM BROMIDE AND ALBUTEROL SULFATE 1 AMPULE: .5; 3 SOLUTION RESPIRATORY (INHALATION) at 01:48

## 2020-01-01 RX ADMIN — GUAIFENESIN 400 MG: 400 TABLET, FILM COATED ORAL at 08:17

## 2020-01-01 RX ADMIN — HEPARIN SODIUM: 10000 INJECTION, SOLUTION INTRAVENOUS; SUBCUTANEOUS at 14:33

## 2020-01-01 RX ADMIN — BUDESONIDE 500 MCG: 0.5 SUSPENSION RESPIRATORY (INHALATION) at 20:39

## 2020-01-01 RX ADMIN — BUDESONIDE 500 MCG: 0.5 SUSPENSION RESPIRATORY (INHALATION) at 21:37

## 2020-01-01 RX ADMIN — METOPROLOL TARTRATE 25 MG: 25 TABLET, FILM COATED ORAL at 08:47

## 2020-01-01 RX ADMIN — INSULIN LISPRO 6 UNITS: 100 INJECTION, SOLUTION INTRAVENOUS; SUBCUTANEOUS at 09:02

## 2020-01-01 RX ADMIN — METOPROLOL TARTRATE 25 MG: 25 TABLET, FILM COATED ORAL at 21:22

## 2020-01-01 RX ADMIN — FENTANYL CITRATE 650 MCG: 50 INJECTION, SOLUTION INTRAMUSCULAR; INTRAVENOUS at 07:33

## 2020-01-01 RX ADMIN — IPRATROPIUM BROMIDE AND ALBUTEROL SULFATE 1 AMPULE: 2.5; .5 SOLUTION RESPIRATORY (INHALATION) at 16:08

## 2020-01-01 RX ADMIN — VASOPRESSIN 2 UNITS: 20 INJECTION INTRAVENOUS at 11:16

## 2020-01-01 RX ADMIN — HEPARIN SODIUM 5000 UNITS: 1000 INJECTION INTRAVENOUS; SUBCUTANEOUS at 09:07

## 2020-01-01 RX ADMIN — SODIUM CHLORIDE, PRESERVATIVE FREE 10 ML: 5 INJECTION INTRAVENOUS at 13:24

## 2020-01-01 RX ADMIN — POTASSIUM CHLORIDE 20 MEQ: 400 INJECTION, SOLUTION INTRAVENOUS at 05:35

## 2020-01-01 RX ADMIN — HYDROCORTISONE SODIUM SUCCINATE 100 MG: 100 INJECTION, POWDER, FOR SOLUTION INTRAMUSCULAR; INTRAVENOUS at 09:44

## 2020-01-01 RX ADMIN — HYDROCORTISONE SODIUM SUCCINATE 100 MG: 100 INJECTION, POWDER, FOR SOLUTION INTRAMUSCULAR; INTRAVENOUS at 20:56

## 2020-01-01 RX ADMIN — BUDESONIDE 500 MCG: 0.5 SUSPENSION RESPIRATORY (INHALATION) at 08:41

## 2020-01-01 RX ADMIN — EPOPROSTENOL 50 NG/KG/MIN: 1.5 INJECTION, POWDER, LYOPHILIZED, FOR SOLUTION INTRAVENOUS at 19:51

## 2020-01-01 RX ADMIN — SODIUM BICARBONATE: 84 INJECTION, SOLUTION INTRAVENOUS at 12:10

## 2020-01-01 RX ADMIN — ARFORMOTEROL TARTRATE 15 MCG: 15 SOLUTION RESPIRATORY (INHALATION) at 20:11

## 2020-01-01 RX ADMIN — VASOPRESSIN 0.02 UNITS/MIN: 20 INJECTION INTRAVENOUS at 13:56

## 2020-01-01 RX ADMIN — EPINEPHRINE 5 MCG/MIN: 1 INJECTION PARENTERAL at 15:00

## 2020-01-01 RX ADMIN — MUPIROCIN: 20 OINTMENT TOPICAL at 20:02

## 2020-01-01 RX ADMIN — ARFORMOTEROL TARTRATE 15 MCG: 15 SOLUTION RESPIRATORY (INHALATION) at 20:39

## 2020-01-01 RX ADMIN — HYDROCORTISONE SODIUM SUCCINATE 100 MG: 100 INJECTION, POWDER, FOR SOLUTION INTRAMUSCULAR; INTRAVENOUS at 13:12

## 2020-01-01 RX ADMIN — LIDOCAINE HYDROCHLORIDE 2 MG/MIN: 4 INJECTION INTRAVENOUS at 18:32

## 2020-01-01 RX ADMIN — SODIUM CHLORIDE: 9 INJECTION, SOLUTION INTRAVENOUS at 06:43

## 2020-01-01 RX ADMIN — AMINOCAPROIC ACID 5000 MG: 250 INJECTION, SOLUTION INTRAVENOUS at 06:59

## 2020-01-01 RX ADMIN — CALCIUM GLUCONATE 1 G: 98 INJECTION, SOLUTION INTRAVENOUS at 18:31

## 2020-01-01 RX ADMIN — IPRATROPIUM BROMIDE AND ALBUTEROL SULFATE 1 AMPULE: .5; 3 SOLUTION RESPIRATORY (INHALATION) at 01:55

## 2020-01-01 RX ADMIN — PANTOPRAZOLE SODIUM 40 MG: 40 TABLET, DELAYED RELEASE ORAL at 06:20

## 2020-01-01 RX ADMIN — PROTAMINE SULFATE 25 MG: 10 INJECTION, SOLUTION INTRAVENOUS at 10:06

## 2020-01-01 RX ADMIN — BUDESONIDE 500 MCG: 0.5 SUSPENSION RESPIRATORY (INHALATION) at 08:03

## 2020-01-01 RX ADMIN — PHENYLEPHRINE HYDROCHLORIDE 100 MCG: 10 INJECTION INTRAVENOUS at 07:01

## 2020-01-01 RX ADMIN — EPINEPHRINE 4 MCG/MIN: 1 INJECTION PARENTERAL at 21:48

## 2020-01-01 RX ADMIN — POTASSIUM CHLORIDE 20 MEQ: 400 INJECTION, SOLUTION INTRAVENOUS at 02:13

## 2020-01-01 RX ADMIN — VASOPRESSIN 0.02 UNITS/MIN: 20 INJECTION INTRAVENOUS at 05:17

## 2020-01-01 RX ADMIN — IOPAMIDOL 60 ML: 755 INJECTION, SOLUTION INTRAVENOUS at 17:22

## 2020-01-01 RX ADMIN — HYDRALAZINE HYDROCHLORIDE 100 MG: 50 TABLET, FILM COATED ORAL at 23:14

## 2020-01-01 RX ADMIN — HYDRALAZINE HYDROCHLORIDE 100 MG: 50 TABLET, FILM COATED ORAL at 21:45

## 2020-01-01 RX ADMIN — HYDRALAZINE HYDROCHLORIDE 100 MG: 50 TABLET, FILM COATED ORAL at 06:35

## 2020-01-01 RX ADMIN — FENTANYL CITRATE 50 MCG: 50 INJECTION, SOLUTION INTRAMUSCULAR; INTRAVENOUS at 13:04

## 2020-01-01 RX ADMIN — BUDESONIDE 500 MCG: 0.5 SUSPENSION RESPIRATORY (INHALATION) at 21:06

## 2020-01-01 RX ADMIN — NITROGLYCERIN 50 MCG: 5 INJECTION, SOLUTION INTRAVENOUS at 11:58

## 2020-01-01 RX ADMIN — HEPARIN SODIUM 10000 UNITS: 10000 INJECTION, SOLUTION INTRAVENOUS; SUBCUTANEOUS at 13:49

## 2020-01-01 RX ADMIN — IPRATROPIUM BROMIDE AND ALBUTEROL SULFATE 1 AMPULE: 2.5; .5 SOLUTION RESPIRATORY (INHALATION) at 16:22

## 2020-01-01 RX ADMIN — PHENYLEPHRINE HYDROCHLORIDE 100 MCG: 10 INJECTION INTRAVENOUS at 08:43

## 2020-01-01 RX ADMIN — IPRATROPIUM BROMIDE AND ALBUTEROL SULFATE 1 AMPULE: 2.5; .5 SOLUTION RESPIRATORY (INHALATION) at 20:56

## 2020-01-01 RX ADMIN — PHENYLEPHRINE HYDROCHLORIDE 50 MCG: 10 INJECTION INTRAVENOUS at 08:10

## 2020-01-01 RX ADMIN — SODIUM CHLORIDE 25 ML: 9 INJECTION, SOLUTION INTRAVENOUS at 05:48

## 2020-01-01 RX ADMIN — SODIUM CHLORIDE 3 UNITS/HR: 9 INJECTION, SOLUTION INTRAVENOUS at 08:45

## 2020-01-01 RX ADMIN — ARFORMOTEROL TARTRATE 15 MCG: 15 SOLUTION RESPIRATORY (INHALATION) at 20:56

## 2020-01-01 RX ADMIN — HYDRALAZINE HYDROCHLORIDE 100 MG: 50 TABLET, FILM COATED ORAL at 21:27

## 2020-01-01 RX ADMIN — MILRINONE LACTATE IN DEXTROSE 0.5 MCG/KG/MIN: 200 INJECTION, SOLUTION INTRAVENOUS at 15:09

## 2020-01-01 RX ADMIN — SODIUM CHLORIDE, PRESERVATIVE FREE 10 ML: 5 INJECTION INTRAVENOUS at 20:48

## 2020-01-01 RX ADMIN — INSULIN LISPRO 6 UNITS: 100 INJECTION, SOLUTION INTRAVENOUS; SUBCUTANEOUS at 00:19

## 2020-01-01 RX ADMIN — IPRATROPIUM BROMIDE AND ALBUTEROL SULFATE 1 AMPULE: .5; 3 SOLUTION RESPIRATORY (INHALATION) at 02:01

## 2020-01-01 RX ADMIN — SODIUM BICARBONATE 100 MEQ: 84 INJECTION INTRAVENOUS at 17:43

## 2020-01-01 RX ADMIN — Medication 10 ML: at 02:39

## 2020-01-01 RX ADMIN — PROPOFOL 50 MG: 10 INJECTION, EMULSION INTRAVENOUS at 06:59

## 2020-01-01 RX ADMIN — SODIUM CHLORIDE 20 ML: 9 INJECTION, SOLUTION INTRAVENOUS at 04:41

## 2020-01-01 RX ADMIN — SODIUM CHLORIDE, PRESERVATIVE FREE 10 ML: 5 INJECTION INTRAVENOUS at 09:20

## 2020-01-01 RX ADMIN — VASOPRESSIN 0.05 UNITS/MIN: 20 INJECTION INTRAVENOUS at 21:08

## 2020-01-01 RX ADMIN — ARFORMOTEROL TARTRATE 15 MCG: 15 SOLUTION RESPIRATORY (INHALATION) at 20:29

## 2020-01-01 RX ADMIN — INSULIN LISPRO 3 UNITS: 100 INJECTION, SOLUTION INTRAVENOUS; SUBCUTANEOUS at 10:09

## 2020-01-01 RX ADMIN — VASOPRESSIN 2 UNITS: 20 INJECTION INTRAVENOUS at 12:20

## 2020-01-01 RX ADMIN — EPINEPHRINE 40 MCG: 1 INJECTION, SOLUTION INTRAMUSCULAR; SUBCUTANEOUS at 13:32

## 2020-01-01 RX ADMIN — SODIUM CHLORIDE: 9 INJECTION, SOLUTION INTRAVENOUS at 18:08

## 2020-01-01 RX ADMIN — MONTELUKAST SODIUM 10 MG: 10 TABLET, FILM COATED ORAL at 22:09

## 2020-01-01 RX ADMIN — METOPROLOL TARTRATE 25 MG: 25 TABLET, FILM COATED ORAL at 09:25

## 2020-01-01 RX ADMIN — MAGNESIUM GLUCONATE 500 MG ORAL TABLET 400 MG: 500 TABLET ORAL at 07:35

## 2020-01-01 RX ADMIN — SODIUM CHLORIDE: 9 INJECTION, SOLUTION INTRAVENOUS at 06:20

## 2020-01-01 RX ADMIN — Medication 50 MCG/HR: at 01:42

## 2020-01-01 RX ADMIN — EPOPROSTENOL 50 NG/KG/MIN: 1.5 INJECTION, POWDER, LYOPHILIZED, FOR SOLUTION INTRAVENOUS at 11:19

## 2020-01-01 RX ADMIN — CHLORHEXIDINE GLUCONATE: 213 SOLUTION TOPICAL at 21:28

## 2020-01-01 RX ADMIN — ARFORMOTEROL TARTRATE 15 MCG: 15 SOLUTION RESPIRATORY (INHALATION) at 08:46

## 2020-01-01 RX ADMIN — BUDESONIDE 500 MCG: 0.5 SUSPENSION RESPIRATORY (INHALATION) at 20:57

## 2020-01-01 RX ADMIN — MAGNESIUM SULFATE HEPTAHYDRATE 1 G: 500 INJECTION, SOLUTION INTRAMUSCULAR; INTRAVENOUS at 13:41

## 2020-01-01 RX ADMIN — PHENYLEPHRINE HYDROCHLORIDE 100 MCG: 10 INJECTION INTRAVENOUS at 08:35

## 2020-01-01 RX ADMIN — SODIUM CHLORIDE: 9 INJECTION, SOLUTION INTRAVENOUS at 09:17

## 2020-01-01 RX ADMIN — VASOPRESSIN 0.05 UNITS/MIN: 20 INJECTION INTRAVENOUS at 05:33

## 2020-01-01 RX ADMIN — SODIUM BICARBONATE: 84 INJECTION, SOLUTION INTRAVENOUS at 07:58

## 2020-01-01 RX ADMIN — HYDROCORTISONE SODIUM SUCCINATE 100 MG: 100 INJECTION, POWDER, FOR SOLUTION INTRAMUSCULAR; INTRAVENOUS at 22:08

## 2020-01-01 RX ADMIN — HYDRALAZINE HYDROCHLORIDE 100 MG: 50 TABLET, FILM COATED ORAL at 14:07

## 2020-01-01 RX ADMIN — VASOPRESSIN 0.02 UNITS/MIN: 20 INJECTION INTRAVENOUS at 00:51

## 2020-01-01 RX ADMIN — HYDRALAZINE HYDROCHLORIDE 100 MG: 50 TABLET, FILM COATED ORAL at 14:48

## 2020-01-01 RX ADMIN — CHLORHEXIDINE GLUCONATE 15 ML: 1.2 RINSE ORAL at 09:54

## 2020-01-01 RX ADMIN — ENOXAPARIN SODIUM 60 MG: 60 INJECTION SUBCUTANEOUS at 08:39

## 2020-01-01 RX ADMIN — LIDOCAINE HYDROCHLORIDE 2 MG/MIN: 4 INJECTION INTRAVENOUS at 02:23

## 2020-01-01 RX ADMIN — IPRATROPIUM BROMIDE AND ALBUTEROL SULFATE 1 AMPULE: 2.5; .5 SOLUTION RESPIRATORY (INHALATION) at 20:10

## 2020-01-01 RX ADMIN — ARFORMOTEROL TARTRATE 15 MCG: 15 SOLUTION RESPIRATORY (INHALATION) at 08:41

## 2020-01-01 RX ADMIN — METOPROLOL TARTRATE 25 MG: 25 TABLET, FILM COATED ORAL at 08:18

## 2020-01-01 RX ADMIN — ARFORMOTEROL TARTRATE 15 MCG: 15 SOLUTION RESPIRATORY (INHALATION) at 08:06

## 2020-01-01 RX ADMIN — CEFAZOLIN SODIUM 2 G: 2 SOLUTION INTRAVENOUS at 04:30

## 2020-01-01 RX ADMIN — PROPOFOL INJECTABLE EMULSION 15 MCG/KG/MIN: 10 INJECTION, EMULSION INTRAVENOUS at 23:29

## 2020-01-01 RX ADMIN — SODIUM CHLORIDE, PRESERVATIVE FREE 10 ML: 5 INJECTION INTRAVENOUS at 08:47

## 2020-01-01 RX ADMIN — SODIUM CHLORIDE, PRESERVATIVE FREE 10 ML: 5 INJECTION INTRAVENOUS at 00:16

## 2020-01-01 RX ADMIN — SODIUM CHLORIDE 1000 ML: 9 INJECTION, SOLUTION INTRAVENOUS at 03:06

## 2020-01-01 RX ADMIN — GUAIFENESIN 400 MG: 400 TABLET, FILM COATED ORAL at 21:45

## 2020-01-01 RX ADMIN — ENOXAPARIN SODIUM 60 MG: 60 INJECTION SUBCUTANEOUS at 21:46

## 2020-01-01 RX ADMIN — HYDRALAZINE HYDROCHLORIDE 50 MG: 50 TABLET, FILM COATED ORAL at 06:20

## 2020-01-01 RX ADMIN — PRAVASTATIN SODIUM 20 MG: 20 TABLET ORAL at 21:38

## 2020-01-01 RX ADMIN — CALCIUM GLUCONATE 1 G: 98 INJECTION, SOLUTION INTRAVENOUS at 22:14

## 2020-01-01 RX ADMIN — FENTANYL CITRATE 50 MCG: 50 INJECTION, SOLUTION INTRAMUSCULAR; INTRAVENOUS at 13:22

## 2020-01-01 RX ADMIN — LEVOTHYROXINE SODIUM 25 MCG: 0.03 TABLET ORAL at 06:35

## 2020-01-01 RX ADMIN — GUAIFENESIN 400 MG: 400 TABLET, FILM COATED ORAL at 20:11

## 2020-01-01 RX ADMIN — APIXABAN 5 MG: 5 TABLET, FILM COATED ORAL at 20:47

## 2020-01-01 RX ADMIN — MAGNESIUM HYDROXIDE 30 ML: 2400 SUSPENSION ORAL at 09:46

## 2020-01-01 RX ADMIN — ASPIRIN 81 MG: 81 TABLET, COATED ORAL at 09:08

## 2020-01-01 RX ADMIN — AMIODARONE HYDROCHLORIDE 0.25 MG/MIN: 50 INJECTION, SOLUTION INTRAVENOUS at 03:39

## 2020-01-01 RX ADMIN — HYDROCORTISONE SODIUM SUCCINATE 50 MG: 100 INJECTION, POWDER, FOR SOLUTION INTRAMUSCULAR; INTRAVENOUS at 20:55

## 2020-01-01 RX ADMIN — PANTOPRAZOLE SODIUM 40 MG: 40 INJECTION, POWDER, FOR SOLUTION INTRAVENOUS at 09:41

## 2020-01-01 RX ADMIN — ALBUMIN (HUMAN) 25 G: 12.5 INJECTION, SOLUTION INTRAVENOUS at 12:17

## 2020-01-01 RX ADMIN — MILRINONE LACTATE IN DEXTROSE 0.5 MCG/KG/MIN: 200 INJECTION, SOLUTION INTRAVENOUS at 00:35

## 2020-01-01 RX ADMIN — BUDESONIDE 500 MCG: 0.5 SUSPENSION RESPIRATORY (INHALATION) at 20:58

## 2020-01-01 RX ADMIN — GUAIFENESIN 400 MG: 400 TABLET, FILM COATED ORAL at 14:48

## 2020-01-01 RX ADMIN — MAGNESIUM HYDROXIDE 30 ML: 400 SUSPENSION ORAL at 14:48

## 2020-01-01 RX ADMIN — CEFEPIME HYDROCHLORIDE 2 G: 2 INJECTION, POWDER, FOR SOLUTION INTRAVENOUS at 01:41

## 2020-01-01 RX ADMIN — PROPOFOL INJECTABLE EMULSION 25 MCG/KG/MIN: 10 INJECTION, EMULSION INTRAVENOUS at 00:26

## 2020-01-01 RX ADMIN — ASPIRIN 81 MG: 81 TABLET, COATED ORAL at 09:25

## 2020-01-01 RX ADMIN — PHENYLEPHRINE HYDROCHLORIDE 50 MCG: 10 INJECTION INTRAVENOUS at 07:47

## 2020-01-01 RX ADMIN — IPRATROPIUM BROMIDE AND ALBUTEROL SULFATE 1 AMPULE: 2.5; .5 SOLUTION RESPIRATORY (INHALATION) at 19:31

## 2020-01-01 RX ADMIN — BUDESONIDE 500 MCG: 0.5 SUSPENSION RESPIRATORY (INHALATION) at 07:58

## 2020-01-01 RX ADMIN — ENOXAPARIN SODIUM 60 MG: 60 INJECTION SUBCUTANEOUS at 08:47

## 2020-01-01 RX ADMIN — MUPIROCIN: 20 OINTMENT TOPICAL at 09:47

## 2020-01-01 RX ADMIN — HYDROCORTISONE SODIUM SUCCINATE 100 MG: 100 INJECTION, POWDER, FOR SOLUTION INTRAMUSCULAR; INTRAVENOUS at 07:37

## 2020-01-01 RX ADMIN — INSULIN HUMAN 3 UNITS: 100 INJECTION, SOLUTION PARENTERAL at 09:13

## 2020-01-01 RX ADMIN — HYDRALAZINE HYDROCHLORIDE 50 MG: 50 TABLET, FILM COATED ORAL at 23:23

## 2020-01-01 RX ADMIN — LIDOCAINE HYDROCHLORIDE 100 MG: 20 INJECTION, SOLUTION INTRAVENOUS at 13:10

## 2020-01-01 RX ADMIN — HYDROCORTISONE SODIUM SUCCINATE 100 MG: 100 INJECTION, POWDER, FOR SOLUTION INTRAMUSCULAR; INTRAVENOUS at 02:57

## 2020-01-01 RX ADMIN — DOCUSATE SODIUM 50MG AND SENNOSIDES 8.6MG 1 TABLET: 8.6; 5 TABLET, FILM COATED ORAL at 22:37

## 2020-01-01 RX ADMIN — LEVOTHYROXINE SODIUM 25 MCG: 0.03 TABLET ORAL at 12:37

## 2020-01-01 RX ADMIN — PROPOFOL INJECTABLE EMULSION 15 MCG/KG/MIN: 10 INJECTION, EMULSION INTRAVENOUS at 05:14

## 2020-01-01 RX ADMIN — BUDESONIDE 500 MCG: 0.5 SUSPENSION RESPIRATORY (INHALATION) at 20:29

## 2020-01-01 RX ADMIN — EPINEPHRINE 10 MCG: 1 INJECTION, SOLUTION INTRAMUSCULAR; SUBCUTANEOUS at 13:45

## 2020-01-01 RX ADMIN — SODIUM CHLORIDE 4.8 UNITS/HR: 9 INJECTION, SOLUTION INTRAVENOUS at 12:23

## 2020-01-01 RX ADMIN — ARFORMOTEROL TARTRATE 15 MCG: 15 SOLUTION RESPIRATORY (INHALATION) at 20:46

## 2020-01-01 RX ADMIN — HYDRALAZINE HYDROCHLORIDE 100 MG: 50 TABLET, FILM COATED ORAL at 20:47

## 2020-01-01 RX ADMIN — PRAVASTATIN SODIUM 20 MG: 20 TABLET ORAL at 00:16

## 2020-01-01 RX ADMIN — AMIODARONE HYDROCHLORIDE 0.5 MG/MIN: 50 INJECTION, SOLUTION INTRAVENOUS at 11:30

## 2020-01-01 RX ADMIN — PROPOFOL INJECTABLE EMULSION 30 MCG/KG/MIN: 10 INJECTION, EMULSION INTRAVENOUS at 20:06

## 2020-01-01 RX ADMIN — SODIUM CHLORIDE, PRESERVATIVE FREE 10 ML: 5 INJECTION INTRAVENOUS at 09:26

## 2020-01-01 RX ADMIN — PROPOFOL INJECTABLE EMULSION 25 MCG/KG/MIN: 10 INJECTION, EMULSION INTRAVENOUS at 23:55

## 2020-01-01 RX ADMIN — LEVOTHYROXINE SODIUM 25 MCG: 0.03 TABLET ORAL at 06:46

## 2020-01-01 RX ADMIN — SODIUM BICARBONATE: 84 INJECTION, SOLUTION INTRAVENOUS at 03:09

## 2020-01-01 RX ADMIN — HYDRALAZINE HYDROCHLORIDE 100 MG: 50 TABLET, FILM COATED ORAL at 14:04

## 2020-01-01 RX ADMIN — Medication 10 MCG: at 09:17

## 2020-01-01 RX ADMIN — IPRATROPIUM BROMIDE AND ALBUTEROL SULFATE 1 AMPULE: 2.5; .5 SOLUTION RESPIRATORY (INHALATION) at 16:13

## 2020-01-01 RX ADMIN — SODIUM CHLORIDE 6.57 UNITS/HR: 9 INJECTION, SOLUTION INTRAVENOUS at 18:30

## 2020-01-01 RX ADMIN — PHENYLEPHRINE HYDROCHLORIDE 100 MCG: 10 INJECTION INTRAVENOUS at 07:34

## 2020-01-01 RX ADMIN — PROPOFOL INJECTABLE EMULSION 25 MCG/KG/MIN: 10 INJECTION, EMULSION INTRAVENOUS at 19:21

## 2020-01-01 RX ADMIN — PROPOFOL INJECTABLE EMULSION 15 MCG/KG/MIN: 10 INJECTION, EMULSION INTRAVENOUS at 12:09

## 2020-01-01 RX ADMIN — MONTELUKAST SODIUM 10 MG: 10 TABLET, FILM COATED ORAL at 20:47

## 2020-01-01 RX ADMIN — IPRATROPIUM BROMIDE AND ALBUTEROL SULFATE 1 AMPULE: 2.5; .5 SOLUTION RESPIRATORY (INHALATION) at 09:50

## 2020-01-01 RX ADMIN — CALCIUM CHLORIDE 1 G: 100 INJECTION, SOLUTION INTRAVENOUS; INTRAVENTRICULAR at 12:16

## 2020-01-01 RX ADMIN — CEFAZOLIN SODIUM 2 G: 2 SOLUTION INTRAVENOUS at 20:24

## 2020-01-01 RX ADMIN — ARFORMOTEROL TARTRATE 15 MCG: 15 SOLUTION RESPIRATORY (INHALATION) at 20:58

## 2020-01-01 RX ADMIN — SODIUM CHLORIDE, PRESERVATIVE FREE 10 ML: 5 INJECTION INTRAVENOUS at 09:08

## 2020-01-01 RX ADMIN — ALBUMIN (HUMAN) 25 G: 0.25 INJECTION, SOLUTION INTRAVENOUS at 17:16

## 2020-01-01 RX ADMIN — LEVOTHYROXINE SODIUM 25 MCG: 0.03 TABLET ORAL at 06:47

## 2020-01-01 RX ADMIN — PANTOPRAZOLE SODIUM 40 MG: 40 TABLET, DELAYED RELEASE ORAL at 06:50

## 2020-01-01 RX ADMIN — IPRATROPIUM BROMIDE AND ALBUTEROL SULFATE 1 AMPULE: 2.5; .5 SOLUTION RESPIRATORY (INHALATION) at 20:25

## 2020-01-01 RX ADMIN — INSULIN LISPRO 3 UNITS: 100 INJECTION, SOLUTION INTRAVENOUS; SUBCUTANEOUS at 14:53

## 2020-01-01 RX ADMIN — CEFEPIME HYDROCHLORIDE 2 G: 2 INJECTION, POWDER, FOR SOLUTION INTRAVENOUS at 13:26

## 2020-01-01 RX ADMIN — Medication 65 MCG/HR: at 01:00

## 2020-01-01 RX ADMIN — SODIUM BICARBONATE: 84 INJECTION, SOLUTION INTRAVENOUS at 15:09

## 2020-01-01 RX ADMIN — CHLORHEXIDINE GLUCONATE 15 ML: 1.2 RINSE ORAL at 21:12

## 2020-01-01 RX ADMIN — PANTOPRAZOLE SODIUM 40 MG: 40 TABLET, DELAYED RELEASE ORAL at 14:50

## 2020-01-01 RX ADMIN — AMIODARONE HYDROCHLORIDE 150 MG: 50 INJECTION, SOLUTION INTRAVENOUS at 13:19

## 2020-01-01 RX ADMIN — HEPARIN SODIUM 10 UNITS/KG/HR: 10000 INJECTION, SOLUTION INTRAVENOUS at 16:38

## 2020-01-01 RX ADMIN — LEVOTHYROXINE SODIUM 25 MCG: 0.03 TABLET ORAL at 06:11

## 2020-01-01 RX ADMIN — POTASSIUM CHLORIDE 20 MEQ: 400 INJECTION, SOLUTION INTRAVENOUS at 04:27

## 2020-01-01 RX ADMIN — POTASSIUM CHLORIDE 20 MEQ: 400 INJECTION, SOLUTION INTRAVENOUS at 15:18

## 2020-01-01 RX ADMIN — ARFORMOTEROL TARTRATE 15 MCG: 15 SOLUTION RESPIRATORY (INHALATION) at 22:14

## 2020-01-01 RX ADMIN — SODIUM CHLORIDE 30 ML/HR: 9 INJECTION, SOLUTION INTRAVENOUS at 15:00

## 2020-01-01 RX ADMIN — MONTELUKAST SODIUM 10 MG: 10 TABLET, FILM COATED ORAL at 20:10

## 2020-01-01 RX ADMIN — HYDRALAZINE HYDROCHLORIDE 100 MG: 50 TABLET, FILM COATED ORAL at 21:18

## 2020-01-01 RX ADMIN — HYDRALAZINE HYDROCHLORIDE 100 MG: 50 TABLET, FILM COATED ORAL at 14:49

## 2020-01-01 RX ADMIN — VECURONIUM BROMIDE 10 MG: 10 INJECTION, POWDER, LYOPHILIZED, FOR SOLUTION INTRAVENOUS at 12:45

## 2020-01-01 RX ADMIN — CEFEPIME HYDROCHLORIDE 2 G: 2 INJECTION, POWDER, FOR SOLUTION INTRAVENOUS at 13:46

## 2020-01-01 RX ADMIN — POTASSIUM CHLORIDE 40 MEQ: 1500 TABLET, EXTENDED RELEASE ORAL at 03:12

## 2020-01-01 RX ADMIN — CEFEPIME HYDROCHLORIDE 2 G: 2 INJECTION, POWDER, FOR SOLUTION INTRAVENOUS at 02:01

## 2020-01-01 RX ADMIN — Medication 10 MILLICURIE: at 11:36

## 2020-01-01 RX ADMIN — MUPIROCIN: 20 OINTMENT TOPICAL at 09:51

## 2020-01-01 RX ADMIN — IPRATROPIUM BROMIDE AND ALBUTEROL SULFATE 1 AMPULE: 2.5; .5 SOLUTION RESPIRATORY (INHALATION) at 13:27

## 2020-01-01 RX ADMIN — INSULIN LISPRO 3 UNITS: 100 INJECTION, SOLUTION INTRAVENOUS; SUBCUTANEOUS at 15:48

## 2020-01-01 RX ADMIN — CALCIUM CHLORIDE 1 G: 100 INJECTION, SOLUTION INTRAVENOUS; INTRAVENTRICULAR at 11:15

## 2020-01-01 RX ADMIN — BUDESONIDE 500 MCG: 0.5 SUSPENSION RESPIRATORY (INHALATION) at 20:11

## 2020-01-01 RX ADMIN — DILTIAZEM HYDROCHLORIDE 240 MG: 240 CAPSULE, EXTENDED RELEASE ORAL at 14:48

## 2020-01-01 RX ADMIN — ARFORMOTEROL TARTRATE 15 MCG: 15 SOLUTION RESPIRATORY (INHALATION) at 07:47

## 2020-01-01 RX ADMIN — IPRATROPIUM BROMIDE AND ALBUTEROL SULFATE 1 AMPULE: 2.5; .5 SOLUTION RESPIRATORY (INHALATION) at 20:29

## 2020-01-01 RX ADMIN — CEFAZOLIN 2000 MG: 1 INJECTION, POWDER, FOR SOLUTION INTRAMUSCULAR; INTRAVENOUS at 12:48

## 2020-01-01 RX ADMIN — CALCIUM GLUCONATE 1 G: 98 INJECTION, SOLUTION INTRAVENOUS at 11:25

## 2020-01-01 RX ADMIN — PANTOPRAZOLE SODIUM 40 MG: 40 INJECTION, POWDER, FOR SOLUTION INTRAVENOUS at 07:34

## 2020-01-01 RX ADMIN — MUPIROCIN: 20 OINTMENT TOPICAL at 21:32

## 2020-01-01 RX ADMIN — ARFORMOTEROL TARTRATE 15 MCG: 15 SOLUTION RESPIRATORY (INHALATION) at 01:20

## 2020-01-01 RX ADMIN — SODIUM CHLORIDE, PRESERVATIVE FREE 10 ML: 5 INJECTION INTRAVENOUS at 08:18

## 2020-01-01 RX ADMIN — BUDESONIDE 500 MCG: 0.5 SUSPENSION RESPIRATORY (INHALATION) at 08:46

## 2020-01-01 RX ADMIN — PHENYLEPHRINE HYDROCHLORIDE 100 MCG: 10 INJECTION INTRAVENOUS at 07:12

## 2020-01-01 RX ADMIN — PROPOFOL INJECTABLE EMULSION 30 MCG/KG/MIN: 10 INJECTION, EMULSION INTRAVENOUS at 06:56

## 2020-01-01 RX ADMIN — BUDESONIDE 500 MCG: 0.5 SUSPENSION RESPIRATORY (INHALATION) at 20:25

## 2020-01-01 RX ADMIN — EPINEPHRINE 3 MCG/MIN: 1 INJECTION PARENTERAL at 20:25

## 2020-01-01 RX ADMIN — AMIODARONE HYDROCHLORIDE 0.5 MG/MIN: 50 INJECTION, SOLUTION INTRAVENOUS at 09:41

## 2020-01-01 RX ADMIN — MONTELUKAST SODIUM 10 MG: 10 TABLET, FILM COATED ORAL at 21:22

## 2020-01-01 RX ADMIN — INSULIN LISPRO 6 UNITS: 100 INJECTION, SOLUTION INTRAVENOUS; SUBCUTANEOUS at 04:50

## 2020-01-01 RX ADMIN — PROPOFOL INJECTABLE EMULSION 30 MCG/KG/MIN: 10 INJECTION, EMULSION INTRAVENOUS at 04:53

## 2020-01-01 RX ADMIN — LIDOCAINE HYDROCHLORIDE 1 MG/MIN: 4 INJECTION INTRAVENOUS at 11:30

## 2020-01-01 RX ADMIN — POTASSIUM CHLORIDE 20 MEQ: 400 INJECTION, SOLUTION INTRAVENOUS at 16:29

## 2020-01-01 RX ADMIN — ARFORMOTEROL TARTRATE 15 MCG: 15 SOLUTION RESPIRATORY (INHALATION) at 08:02

## 2020-01-01 RX ADMIN — IPRATROPIUM BROMIDE AND ALBUTEROL SULFATE 1 AMPULE: 2.5; .5 SOLUTION RESPIRATORY (INHALATION) at 11:34

## 2020-01-01 RX ADMIN — ATORVASTATIN CALCIUM 40 MG: 40 TABLET, FILM COATED ORAL at 21:45

## 2020-01-01 RX ADMIN — APIXABAN 5 MG: 5 TABLET, FILM COATED ORAL at 12:37

## 2020-01-01 RX ADMIN — MUPIROCIN: 20 OINTMENT TOPICAL at 07:35

## 2020-01-01 RX ADMIN — CHLORHEXIDINE GLUCONATE 15 ML: 1.2 RINSE ORAL at 20:23

## 2020-01-01 RX ADMIN — CALCIUM GLUCONATE 1 G: 98 INJECTION, SOLUTION INTRAVENOUS at 16:29

## 2020-01-01 RX ADMIN — BUDESONIDE 500 MCG: 0.5 SUSPENSION RESPIRATORY (INHALATION) at 21:18

## 2020-01-01 RX ADMIN — PROPOFOL INJECTABLE EMULSION 10 MCG/KG/MIN: 10 INJECTION, EMULSION INTRAVENOUS at 15:00

## 2020-01-01 RX ADMIN — SODIUM CHLORIDE 10 MG/HR: 900 INJECTION, SOLUTION INTRAVENOUS at 11:29

## 2020-01-01 RX ADMIN — ASPIRIN 81 MG: 81 TABLET, COATED ORAL at 08:47

## 2020-01-01 RX ADMIN — MAGNESIUM HYDROXIDE 30 ML: 2400 SUSPENSION ORAL at 20:11

## 2020-01-01 RX ADMIN — ENOXAPARIN SODIUM 60 MG: 60 INJECTION SUBCUTANEOUS at 21:17

## 2020-01-01 RX ADMIN — POTASSIUM CHLORIDE 40 MEQ: 1500 TABLET, EXTENDED RELEASE ORAL at 14:50

## 2020-01-01 RX ADMIN — IOPAMIDOL 60 ML: 755 INJECTION, SOLUTION INTRAVENOUS at 02:47

## 2020-01-01 RX ADMIN — ATORVASTATIN CALCIUM 40 MG: 40 TABLET, FILM COATED ORAL at 21:22

## 2020-01-01 RX ADMIN — ASPIRIN 81 MG: 81 TABLET, COATED ORAL at 08:17

## 2020-01-01 RX ADMIN — LIDOCAINE HYDROCHLORIDE ANHYDROUS AND DEXTROSE MONOHYDRATE 2 MG/MIN: .4; 5 INJECTION, SOLUTION INTRAVENOUS at 13:19

## 2020-01-01 RX ADMIN — VASOPRESSIN 2 UNITS: 20 INJECTION INTRAVENOUS at 11:11

## 2020-01-01 RX ADMIN — CHLORHEXIDINE GLUCONATE 15 ML: 1.2 RINSE ORAL at 08:29

## 2020-01-01 RX ADMIN — GABAPENTIN 100 MG: 100 CAPSULE ORAL at 23:23

## 2020-01-01 RX ADMIN — IPRATROPIUM BROMIDE AND ALBUTEROL SULFATE 1 AMPULE: 2.5; .5 SOLUTION RESPIRATORY (INHALATION) at 08:17

## 2020-01-01 RX ADMIN — BUDESONIDE 500 MCG: 0.5 SUSPENSION RESPIRATORY (INHALATION) at 19:31

## 2020-01-01 RX ADMIN — SODIUM CHLORIDE, PRESERVATIVE FREE 10 ML: 5 INJECTION INTRAVENOUS at 21:38

## 2020-01-01 RX ADMIN — CHLORHEXIDINE GLUCONATE 15 ML: 1.2 RINSE ORAL at 09:20

## 2020-01-01 RX ADMIN — MONTELUKAST SODIUM 10 MG: 10 TABLET, FILM COATED ORAL at 20:02

## 2020-01-01 RX ADMIN — ARFORMOTEROL TARTRATE 15 MCG: 15 SOLUTION RESPIRATORY (INHALATION) at 20:25

## 2020-01-01 RX ADMIN — PHENYLEPHRINE HYDROCHLORIDE 100 MCG: 10 INJECTION INTRAVENOUS at 07:10

## 2020-01-01 RX ADMIN — BUDESONIDE 500 MCG: 0.5 SUSPENSION RESPIRATORY (INHALATION) at 09:18

## 2020-01-01 RX ADMIN — PANTOPRAZOLE SODIUM 40 MG: 40 TABLET, DELAYED RELEASE ORAL at 06:11

## 2020-01-01 RX ADMIN — HYDROCORTISONE SODIUM SUCCINATE 100 MG: 100 INJECTION, POWDER, FOR SOLUTION INTRAMUSCULAR; INTRAVENOUS at 05:14

## 2020-01-01 RX ADMIN — MAGNESIUM SULFATE IN WATER 2 G: 40 INJECTION, SOLUTION INTRAVENOUS at 09:16

## 2020-01-01 RX ADMIN — SODIUM CHLORIDE 100 ML/HR: 9 INJECTION, SOLUTION INTRAVENOUS at 14:57

## 2020-01-01 RX ADMIN — PANTOPRAZOLE SODIUM 40 MG: 40 INJECTION, POWDER, FOR SOLUTION INTRAVENOUS at 09:20

## 2020-01-01 RX ADMIN — ARFORMOTEROL TARTRATE 15 MCG: 15 SOLUTION RESPIRATORY (INHALATION) at 08:31

## 2020-01-01 RX ADMIN — CEFAZOLIN SODIUM 2 G: 2 SOLUTION INTRAVENOUS at 10:47

## 2020-01-01 RX ADMIN — BUDESONIDE 500 MCG: 0.5 SUSPENSION RESPIRATORY (INHALATION) at 09:19

## 2020-01-01 RX ADMIN — LORAZEPAM 0.25 MG: 0.5 TABLET ORAL at 15:28

## 2020-01-01 RX ADMIN — GUAIFENESIN 400 MG: 400 TABLET, FILM COATED ORAL at 14:49

## 2020-01-01 RX ADMIN — CHLORHEXIDINE GLUCONATE 15 ML: 1.2 RINSE ORAL at 07:34

## 2020-01-01 RX ADMIN — HYDROCORTISONE SODIUM SUCCINATE 100 MG: 100 INJECTION, POWDER, FOR SOLUTION INTRAMUSCULAR; INTRAVENOUS at 13:24

## 2020-01-01 RX ADMIN — IPRATROPIUM BROMIDE AND ALBUTEROL SULFATE 1 AMPULE: 2.5; .5 SOLUTION RESPIRATORY (INHALATION) at 20:31

## 2020-01-01 RX ADMIN — BUDESONIDE 250 MCG: 0.25 SUSPENSION RESPIRATORY (INHALATION) at 01:19

## 2020-01-01 RX ADMIN — IOPAMIDOL 70 ML: 755 INJECTION, SOLUTION INTRAVENOUS at 02:39

## 2020-01-01 RX ADMIN — HEPARIN SODIUM: 10000 INJECTION, SOLUTION INTRAVENOUS; SUBCUTANEOUS at 02:18

## 2020-01-01 RX ADMIN — CALCIUM GLUCONATE 1 G: 98 INJECTION, SOLUTION INTRAVENOUS at 10:16

## 2020-01-01 RX ADMIN — IPRATROPIUM BROMIDE AND ALBUTEROL SULFATE 1 AMPULE: 2.5; .5 SOLUTION RESPIRATORY (INHALATION) at 07:58

## 2020-01-01 RX ADMIN — HYDRALAZINE HYDROCHLORIDE 100 MG: 50 TABLET, FILM COATED ORAL at 13:53

## 2020-01-01 RX ADMIN — INSULIN LISPRO 3 UNITS: 100 INJECTION, SOLUTION INTRAVENOUS; SUBCUTANEOUS at 11:58

## 2020-01-01 RX ADMIN — MAGNESIUM GLUCONATE 500 MG ORAL TABLET 400 MG: 500 TABLET ORAL at 08:29

## 2020-01-01 RX ADMIN — SODIUM CHLORIDE, PRESERVATIVE FREE 10 ML: 5 INJECTION INTRAVENOUS at 14:49

## 2020-01-01 RX ADMIN — PANTOPRAZOLE SODIUM 40 MG: 40 INJECTION, POWDER, FOR SOLUTION INTRAVENOUS at 08:28

## 2020-01-01 RX ADMIN — MILRINONE LACTATE IN DEXTROSE 0.5 MCG/KG/MIN: 200 INJECTION, SOLUTION INTRAVENOUS at 03:41

## 2020-01-01 RX ADMIN — INSULIN LISPRO 3 UNITS: 100 INJECTION, SOLUTION INTRAVENOUS; SUBCUTANEOUS at 16:56

## 2020-01-01 RX ADMIN — Medication 0.04 MCG/KG/MIN: at 10:05

## 2020-01-01 RX ADMIN — EPINEPHRINE 3 MCG/MIN: 1 INJECTION PARENTERAL at 20:45

## 2020-01-01 RX ADMIN — ASPIRIN 81 MG 241 MG: 81 TABLET ORAL at 15:50

## 2020-01-01 RX ADMIN — DILTIAZEM HYDROCHLORIDE 240 MG: 240 CAPSULE, EXTENDED RELEASE ORAL at 12:37

## 2020-01-01 RX ADMIN — HYDROCORTISONE SODIUM SUCCINATE 100 MG: 100 INJECTION, POWDER, FOR SOLUTION INTRAMUSCULAR; INTRAVENOUS at 18:02

## 2020-01-01 RX ADMIN — BUDESONIDE 500 MCG: 0.5 SUSPENSION RESPIRATORY (INHALATION) at 07:48

## 2020-01-01 RX ADMIN — EPINEPHRINE 10 MCG: 1 INJECTION, SOLUTION INTRAMUSCULAR; SUBCUTANEOUS at 09:57

## 2020-01-01 RX ADMIN — POTASSIUM CHLORIDE 20 MEQ: 400 INJECTION, SOLUTION INTRAVENOUS at 03:41

## 2020-01-01 RX ADMIN — METOPROLOL TARTRATE 25 MG: 25 TABLET, FILM COATED ORAL at 21:45

## 2020-01-01 RX ADMIN — ORPHENADRINE CITRATE 60 MG: 30 INJECTION INTRAMUSCULAR; INTRAVENOUS at 03:32

## 2020-01-01 RX ADMIN — Medication 10 ML: at 17:22

## 2020-01-01 RX ADMIN — CEFEPIME HYDROCHLORIDE 2 G: 2 INJECTION, POWDER, FOR SOLUTION INTRAVENOUS at 15:16

## 2020-01-01 RX ADMIN — HYDRALAZINE HYDROCHLORIDE 100 MG: 50 TABLET, FILM COATED ORAL at 06:46

## 2020-01-01 RX ADMIN — PROPOFOL INJECTABLE EMULSION 25 MCG/KG/MIN: 10 INJECTION, EMULSION INTRAVENOUS at 17:37

## 2020-01-01 RX ADMIN — BUDESONIDE 500 MCG: 0.5 SUSPENSION RESPIRATORY (INHALATION) at 09:50

## 2020-01-01 RX ADMIN — VASOPRESSIN 0.05 UNITS/MIN: 20 INJECTION INTRAVENOUS at 12:35

## 2020-01-01 RX ADMIN — HYDRALAZINE HYDROCHLORIDE 100 MG: 50 TABLET, FILM COATED ORAL at 06:11

## 2020-01-01 RX ADMIN — SODIUM CHLORIDE 30 ML/HR: 9 INJECTION, SOLUTION INTRAVENOUS at 18:19

## 2020-01-01 RX ADMIN — PHENYLEPHRINE HYDROCHLORIDE 100 MCG: 10 INJECTION INTRAVENOUS at 08:31

## 2020-01-01 RX ADMIN — CHLORHEXIDINE GLUCONATE 15 ML: 1.2 RINSE ORAL at 20:37

## 2020-01-01 RX ADMIN — GUAIFENESIN 400 MG: 400 TABLET, FILM COATED ORAL at 13:53

## 2020-01-01 RX ADMIN — SODIUM CHLORIDE, PRESERVATIVE FREE 10 ML: 5 INJECTION INTRAVENOUS at 20:10

## 2020-01-01 RX ADMIN — HYDROCORTISONE SODIUM SUCCINATE 100 MG: 100 INJECTION, POWDER, FOR SOLUTION INTRAMUSCULAR; INTRAVENOUS at 02:10

## 2020-01-01 RX ADMIN — CALCIUM GLUCONATE 1 G: 98 INJECTION, SOLUTION INTRAVENOUS at 18:52

## 2020-01-01 RX ADMIN — SODIUM CHLORIDE 4.89 UNITS/HR: 9 INJECTION, SOLUTION INTRAVENOUS at 10:24

## 2020-01-01 RX ADMIN — ARFORMOTEROL TARTRATE 15 MCG: 15 SOLUTION RESPIRATORY (INHALATION) at 21:18

## 2020-01-01 RX ADMIN — IPRATROPIUM BROMIDE AND ALBUTEROL SULFATE 1 AMPULE: 2.5; .5 SOLUTION RESPIRATORY (INHALATION) at 16:05

## 2020-01-01 RX ADMIN — Medication 25 MCG/HR: at 12:07

## 2020-01-01 RX ADMIN — EPINEPHRINE 3 MCG/MIN: 1 INJECTION PARENTERAL at 11:30

## 2020-01-01 RX ADMIN — PROPOFOL INJECTABLE EMULSION 30 MCG/KG/MIN: 10 INJECTION, EMULSION INTRAVENOUS at 14:23

## 2020-01-01 RX ADMIN — SODIUM CHLORIDE, PRESERVATIVE FREE 10 ML: 5 INJECTION INTRAVENOUS at 22:10

## 2020-01-01 RX ADMIN — MILRINONE LACTATE IN DEXTROSE 0.25 MCG/KG/MIN: 200 INJECTION, SOLUTION INTRAVENOUS at 10:54

## 2020-01-01 RX ADMIN — HYDROCORTISONE SODIUM SUCCINATE 100 MG: 100 INJECTION, POWDER, FOR SOLUTION INTRAMUSCULAR; INTRAVENOUS at 07:27

## 2020-01-01 RX ADMIN — MILRINONE LACTATE IN DEXTROSE 0.5 MCG/KG/MIN: 200 INJECTION, SOLUTION INTRAVENOUS at 13:29

## 2020-01-01 RX ADMIN — SODIUM CHLORIDE 7.3 UNITS/HR: 9 INJECTION, SOLUTION INTRAVENOUS at 16:36

## 2020-01-01 RX ADMIN — NITROGLYCERIN 3 MCG/MIN: 20 INJECTION INTRAVENOUS at 11:00

## 2020-01-01 RX ADMIN — LEVOTHYROXINE SODIUM 25 MCG: 0.03 TABLET ORAL at 06:48

## 2020-01-01 RX ADMIN — EPINEPHRINE 10 MCG: 1 INJECTION, SOLUTION INTRAMUSCULAR; SUBCUTANEOUS at 11:15

## 2020-01-01 RX ADMIN — AMIODARONE HYDROCHLORIDE 0.25 MG/MIN: 50 INJECTION, SOLUTION INTRAVENOUS at 23:52

## 2020-01-01 RX ADMIN — SODIUM CHLORIDE 30 ML/HR: 9 INJECTION, SOLUTION INTRAVENOUS at 10:40

## 2020-01-01 RX ADMIN — GUAIFENESIN 400 MG: 400 TABLET, FILM COATED ORAL at 09:25

## 2020-01-01 RX ADMIN — CETIRIZINE HYDROCHLORIDE 10 MG: 10 TABLET, FILM COATED ORAL at 08:38

## 2020-01-01 RX ADMIN — VASOPRESSIN 2 UNITS: 20 INJECTION INTRAVENOUS at 12:10

## 2020-01-01 RX ADMIN — VASOPRESSIN 0.03 UNITS/MIN: 20 INJECTION INTRAVENOUS at 09:15

## 2020-01-01 RX ADMIN — VITAMIN D, TAB 1000IU (100/BT) 1000 UNITS: 25 TAB at 08:38

## 2020-01-01 RX ADMIN — ARFORMOTEROL TARTRATE 15 MCG: 15 SOLUTION RESPIRATORY (INHALATION) at 21:06

## 2020-01-01 RX ADMIN — HYDROCORTISONE SODIUM SUCCINATE 100 MG: 100 INJECTION, POWDER, FOR SOLUTION INTRAMUSCULAR; INTRAVENOUS at 21:31

## 2020-01-01 RX ADMIN — PHENYLEPHRINE HYDROCHLORIDE 100 MCG: 10 INJECTION INTRAVENOUS at 08:34

## 2020-01-01 RX ADMIN — CHLORHEXIDINE GLUCONATE 15 ML: 1.2 RINSE ORAL at 06:03

## 2020-01-01 RX ADMIN — EPOPROSTENOL 50 NG/KG/MIN: 1.5 INJECTION, POWDER, LYOPHILIZED, FOR SOLUTION INTRAVENOUS at 20:25

## 2020-01-01 RX ADMIN — SODIUM BICARBONATE: 84 INJECTION, SOLUTION INTRAVENOUS at 08:30

## 2020-01-01 RX ADMIN — Medication 1 CAPSULE: at 08:38

## 2020-01-01 RX ADMIN — SODIUM CHLORIDE, PRESERVATIVE FREE 10 ML: 5 INJECTION INTRAVENOUS at 20:54

## 2020-01-01 RX ADMIN — HYDROCORTISONE SODIUM SUCCINATE 100 MG: 100 INJECTION, POWDER, FOR SOLUTION INTRAMUSCULAR; INTRAVENOUS at 18:39

## 2020-01-01 RX ADMIN — AMLODIPINE BESYLATE 5 MG: 5 TABLET ORAL at 23:23

## 2020-01-01 RX ADMIN — SODIUM CHLORIDE, PRESERVATIVE FREE 10 ML: 5 INJECTION INTRAVENOUS at 12:38

## 2020-01-01 RX ADMIN — MINERAL OIL AND PETROLATUM: 150; 830 OINTMENT OPHTHALMIC at 02:07

## 2020-01-01 RX ADMIN — CEFAZOLIN SODIUM 2 G: 2 SOLUTION INTRAVENOUS at 03:33

## 2020-01-01 RX ADMIN — PRAVASTATIN SODIUM 20 MG: 20 TABLET ORAL at 20:47

## 2020-01-01 RX ADMIN — INSULIN LISPRO 6 UNITS: 100 INJECTION, SOLUTION INTRAVENOUS; SUBCUTANEOUS at 00:29

## 2020-01-01 RX ADMIN — HYDRALAZINE HYDROCHLORIDE 100 MG: 50 TABLET, FILM COATED ORAL at 06:47

## 2020-01-01 RX ADMIN — EPINEPHRINE 5 MCG/MIN: 1 INJECTION PARENTERAL at 06:29

## 2020-01-01 RX ADMIN — SODIUM CHLORIDE, PRESERVATIVE FREE 10 ML: 5 INJECTION INTRAVENOUS at 09:10

## 2020-01-01 RX ADMIN — CLONAZEPAM 0.25 MG: 0.5 TABLET ORAL at 08:38

## 2020-01-01 RX ADMIN — SODIUM CHLORIDE 30 ML/HR: 9 INJECTION, SOLUTION INTRAVENOUS at 18:32

## 2020-01-01 RX ADMIN — HYDRALAZINE HYDROCHLORIDE 100 MG: 50 TABLET, FILM COATED ORAL at 12:37

## 2020-01-01 RX ADMIN — ALBUMIN (HUMAN) 500 ML: 12.5 INJECTION, SOLUTION INTRAVENOUS at 09:48

## 2020-01-01 RX ADMIN — ARFORMOTEROL TARTRATE 15 MCG: 15 SOLUTION RESPIRATORY (INHALATION) at 09:50

## 2020-01-01 RX ADMIN — HEPARIN SODIUM: 10000 INJECTION, SOLUTION INTRAVENOUS; SUBCUTANEOUS at 21:24

## 2020-01-01 RX ADMIN — PROPOFOL INJECTABLE EMULSION 25 MCG/KG/MIN: 10 INJECTION, EMULSION INTRAVENOUS at 09:43

## 2020-01-01 RX ADMIN — VECURONIUM BROMIDE 5 MG: 10 INJECTION, POWDER, LYOPHILIZED, FOR SOLUTION INTRAVENOUS at 10:30

## 2020-01-01 RX ADMIN — Medication 10 MCG: at 08:43

## 2020-01-01 RX ADMIN — INSULIN LISPRO 6 UNITS: 100 INJECTION, SOLUTION INTRAVENOUS; SUBCUTANEOUS at 20:12

## 2020-01-01 RX ADMIN — POTASSIUM CHLORIDE 20 MEQ: 400 INJECTION, SOLUTION INTRAVENOUS at 14:17

## 2020-01-01 RX ADMIN — INSULIN LISPRO 6 UNITS: 100 INJECTION, SOLUTION INTRAVENOUS; SUBCUTANEOUS at 04:55

## 2020-01-01 RX ADMIN — POTASSIUM CHLORIDE 20 MEQ: 400 INJECTION, SOLUTION INTRAVENOUS at 01:11

## 2020-01-01 RX ADMIN — HYDROCORTISONE SODIUM SUCCINATE 100 MG: 100 INJECTION, POWDER, FOR SOLUTION INTRAMUSCULAR; INTRAVENOUS at 13:06

## 2020-01-01 RX ADMIN — HYDRALAZINE HYDROCHLORIDE 100 MG: 50 TABLET, FILM COATED ORAL at 21:37

## 2020-01-01 RX ADMIN — MIDAZOLAM 1 MG: 1 INJECTION INTRAMUSCULAR; INTRAVENOUS at 06:51

## 2020-01-01 RX ADMIN — Medication 25 MCG/HR: at 16:30

## 2020-01-01 RX ADMIN — FLUTICASONE PROPIONATE 2 SPRAY: 50 SPRAY, METERED NASAL at 08:48

## 2020-01-01 RX ADMIN — CEFAZOLIN SODIUM 2 G: 2 SOLUTION INTRAVENOUS at 16:00

## 2020-01-01 RX ADMIN — SODIUM CHLORIDE, PRESERVATIVE FREE 10 ML: 5 INJECTION INTRAVENOUS at 21:14

## 2020-01-01 RX ADMIN — HYDROCORTISONE SODIUM SUCCINATE 100 MG: 100 INJECTION, POWDER, FOR SOLUTION INTRAMUSCULAR; INTRAVENOUS at 05:26

## 2020-01-01 RX ADMIN — SODIUM CHLORIDE 5 MG/HR: 900 INJECTION, SOLUTION INTRAVENOUS at 09:20

## 2020-01-01 RX ADMIN — SODIUM CHLORIDE, PRESERVATIVE FREE 10 ML: 5 INJECTION INTRAVENOUS at 08:12

## 2020-01-01 RX ADMIN — DILTIAZEM HYDROCHLORIDE 240 MG: 240 CAPSULE, EXTENDED RELEASE ORAL at 08:12

## 2020-01-01 RX ADMIN — PROTAMINE SULFATE 250 MG: 10 INJECTION, SOLUTION INTRAVENOUS at 11:52

## 2020-01-01 RX ADMIN — EPOPROSTENOL 50 NG/KG/MIN: 1.5 INJECTION, POWDER, LYOPHILIZED, FOR SOLUTION INTRAVENOUS at 08:00

## 2020-01-01 RX ADMIN — MUPIROCIN: 20 OINTMENT TOPICAL at 08:30

## 2020-01-01 RX ADMIN — SODIUM CHLORIDE, PRESERVATIVE FREE 10 ML: 5 INJECTION INTRAVENOUS at 20:02

## 2020-01-01 RX ADMIN — REGADENOSON 0.4 MG: 0.08 INJECTION, SOLUTION INTRAVENOUS at 11:20

## 2020-01-01 RX ADMIN — ASPIRIN 325 MG: 325 TABLET, FILM COATED ORAL at 09:14

## 2020-01-01 RX ADMIN — ACETAMINOPHEN 650 MG: 325 TABLET, FILM COATED ORAL at 23:14

## 2020-01-01 RX ADMIN — EPINEPHRINE 2 MCG/MIN: 1 INJECTION PARENTERAL at 07:07

## 2020-01-01 RX ADMIN — EPINEPHRINE 0.05 MCG/KG/MIN: 1 INJECTION PARENTERAL at 10:25

## 2020-01-01 RX ADMIN — MUPIROCIN: 20 OINTMENT TOPICAL at 20:24

## 2020-01-01 RX ADMIN — EPOPROSTENOL 50 NG/KG/MIN: 1.5 INJECTION, POWDER, LYOPHILIZED, FOR SOLUTION INTRAVENOUS at 10:41

## 2020-01-01 RX ADMIN — MONTELUKAST SODIUM 10 MG: 10 TABLET, FILM COATED ORAL at 21:38

## 2020-01-01 RX ADMIN — APIXABAN 5 MG: 5 TABLET, FILM COATED ORAL at 08:12

## 2020-01-01 RX ADMIN — GUAIFENESIN 400 MG: 400 TABLET, FILM COATED ORAL at 21:22

## 2020-01-01 RX ADMIN — METOPROLOL TARTRATE 25 MG: 25 TABLET, FILM COATED ORAL at 20:10

## 2020-01-01 RX ADMIN — MAGNESIUM HYDROXIDE 30 ML: 400 SUSPENSION ORAL at 14:50

## 2020-01-01 RX ADMIN — CHLORHEXIDINE GLUCONATE 15 ML: 1.2 RINSE ORAL at 21:31

## 2020-01-01 RX ADMIN — SODIUM CHLORIDE, POTASSIUM CHLORIDE, SODIUM LACTATE AND CALCIUM CHLORIDE: 600; 310; 30; 20 INJECTION, SOLUTION INTRAVENOUS at 06:58

## 2020-01-01 RX ADMIN — GUAIFENESIN 400 MG: 400 TABLET, FILM COATED ORAL at 20:02

## 2020-01-01 RX ADMIN — VASOPRESSIN 0.06 UNITS/MIN: 20 INJECTION INTRAVENOUS at 11:30

## 2020-01-01 RX ADMIN — CEFAZOLIN SODIUM 2 G: 2 SOLUTION INTRAVENOUS at 20:30

## 2020-01-01 RX ADMIN — Medication 10 MCG: at 09:39

## 2020-01-01 RX ADMIN — LEVOTHYROXINE SODIUM 25 MCG: 0.03 TABLET ORAL at 09:43

## 2020-01-01 RX ADMIN — AMIODARONE HYDROCHLORIDE 0.5 MG/MIN: 50 INJECTION, SOLUTION INTRAVENOUS at 22:17

## 2020-01-01 RX ADMIN — VASOPRESSIN 0.1 UNITS/MIN: 20 INJECTION INTRAVENOUS at 22:13

## 2020-01-01 RX ADMIN — VASOPRESSIN 0.06 UNITS/MIN: 20 INJECTION INTRAVENOUS at 18:35

## 2020-01-01 RX ADMIN — SODIUM CHLORIDE, PRESERVATIVE FREE 10 ML: 5 INJECTION INTRAVENOUS at 07:34

## 2020-01-01 RX ADMIN — ARFORMOTEROL TARTRATE 15 MCG: 15 SOLUTION RESPIRATORY (INHALATION) at 21:37

## 2020-01-01 RX ADMIN — MILRINONE LACTATE IN DEXTROSE 0.5 MCG/KG/MIN: 200 INJECTION, SOLUTION INTRAVENOUS at 04:43

## 2020-01-01 RX ADMIN — MUPIROCIN: 20 OINTMENT TOPICAL at 22:17

## 2020-01-01 RX ADMIN — FENTANYL CITRATE 100 MCG: 50 INJECTION, SOLUTION INTRAMUSCULAR; INTRAVENOUS at 06:59

## 2020-01-01 RX ADMIN — MUPIROCIN: 20 OINTMENT TOPICAL at 09:20

## 2020-01-01 RX ADMIN — VASOPRESSIN 0.03 UNITS/MIN: 20 INJECTION INTRAVENOUS at 22:19

## 2020-01-01 RX ADMIN — FLUTICASONE PROPIONATE 2 SPRAY: 50 SPRAY, METERED NASAL at 14:48

## 2020-01-01 RX ADMIN — CEFEPIME HYDROCHLORIDE 2 G: 2 INJECTION, POWDER, FOR SOLUTION INTRAVENOUS at 02:13

## 2020-01-01 RX ADMIN — GUAIFENESIN 400 MG: 400 TABLET, FILM COATED ORAL at 14:04

## 2020-01-01 RX ADMIN — IPRATROPIUM BROMIDE AND ALBUTEROL SULFATE 1 AMPULE: 2.5; .5 SOLUTION RESPIRATORY (INHALATION) at 12:15

## 2020-01-01 RX ADMIN — ALBUMIN (HUMAN) 25 G: 12.5 INJECTION, SOLUTION INTRAVENOUS at 15:00

## 2020-01-01 RX ADMIN — PHENYLEPHRINE HYDROCHLORIDE 100 MCG: 10 INJECTION INTRAVENOUS at 07:25

## 2020-01-01 RX ADMIN — ARFORMOTEROL TARTRATE 15 MCG: 15 SOLUTION RESPIRATORY (INHALATION) at 07:58

## 2020-01-01 RX ADMIN — CHLORHEXIDINE GLUCONATE 15 ML: 1.2 RINSE ORAL at 22:08

## 2020-01-01 RX ADMIN — AMIODARONE HYDROCHLORIDE 1 MG/MIN: 50 INJECTION, SOLUTION INTRAVENOUS at 22:13

## 2020-01-01 RX ADMIN — MONTELUKAST SODIUM 10 MG: 10 TABLET, FILM COATED ORAL at 21:45

## 2020-01-01 RX ADMIN — GUAIFENESIN 400 MG: 400 TABLET, FILM COATED ORAL at 08:47

## 2020-01-01 RX ADMIN — SODIUM CHLORIDE, PRESERVATIVE FREE 10 ML: 5 INJECTION INTRAVENOUS at 08:29

## 2020-01-01 RX ADMIN — PHENYLEPHRINE HYDROCHLORIDE 100 MCG: 10 INJECTION INTRAVENOUS at 07:41

## 2020-01-01 RX ADMIN — AMINOCAPROIC ACID 1 G/HR: 250 INJECTION, SOLUTION INTRAVENOUS at 07:08

## 2020-01-01 ASSESSMENT — PULMONARY FUNCTION TESTS
PIF_VALUE: 24
PIF_VALUE: 0
PIF_VALUE: 23
PIF_VALUE: 1
PIF_VALUE: 23
PIF_VALUE: 25
PIF_VALUE: 21
PIF_VALUE: 0
PIF_VALUE: 22
PIF_VALUE: 24
PIF_VALUE: 0
PIF_VALUE: 22
PIF_VALUE: 0
PIF_VALUE: 23
PIF_VALUE: 20
PIF_VALUE: 0
PIF_VALUE: 1
PIF_VALUE: 39
PIF_VALUE: 24
PIF_VALUE: 0
PIF_VALUE: 20
PIF_VALUE: 22
PIF_VALUE: 0
PIF_VALUE: 16
PIF_VALUE: 22
PIF_VALUE: 21
PIF_VALUE: 21
PIF_VALUE: 20
PIF_VALUE: 23
PIF_VALUE: 28
PIF_VALUE: 23
PIF_VALUE: 22
PIF_VALUE: 23
PIF_VALUE: 27
PIF_VALUE: 21
PIF_VALUE: 38
PIF_VALUE: 22
PIF_VALUE: 22
PIF_VALUE: 1
PIF_VALUE: 24
PIF_VALUE: 20
PIF_VALUE: 20
PIF_VALUE: 23
PIF_VALUE: 19
PIF_VALUE: 23
PIF_VALUE: 23
PIF_VALUE: 22
PIF_VALUE: 1
PIF_VALUE: 25
PIF_VALUE: 0
PIF_VALUE: 31
PIF_VALUE: 0
PIF_VALUE: 1
PIF_VALUE: 1
PIF_VALUE: 0
PIF_VALUE: 22
PIF_VALUE: 21
PIF_VALUE: 23
PIF_VALUE: 31
PIF_VALUE: 0
PIF_VALUE: 36
PIF_VALUE: 1
PIF_VALUE: 23
PIF_VALUE: 0
PIF_VALUE: 21
PIF_VALUE: 23
PIF_VALUE: 22
PIF_VALUE: 23
PIF_VALUE: 1
PIF_VALUE: 25
PIF_VALUE: 1
PIF_VALUE: 0
PIF_VALUE: 0
PIF_VALUE: 30
PIF_VALUE: 0
PIF_VALUE: 37
PIF_VALUE: 24
PIF_VALUE: 23
PIF_VALUE: 0
PIF_VALUE: 21
PIF_VALUE: 0
PIF_VALUE: 21
PIF_VALUE: 21
PIF_VALUE: 0
PIF_VALUE: 0
PIF_VALUE: 22
PIF_VALUE: 23
PIF_VALUE: 0
PIF_VALUE: 0
PIF_VALUE: 17
PIF_VALUE: 29
PIF_VALUE: 0
PIF_VALUE: 0
PIF_VALUE: 22
PIF_VALUE: 23
PIF_VALUE: 25
PIF_VALUE: 22
PIF_VALUE: 0
PIF_VALUE: 1
PIF_VALUE: 0
PIF_VALUE: 24
PIF_VALUE: 1
PIF_VALUE: 23
PIF_VALUE: 21
PIF_VALUE: 23
PIF_VALUE: 1
PIF_VALUE: 1
PIF_VALUE: 41
PIF_VALUE: 29
PIF_VALUE: 17
PIF_VALUE: 1
PIF_VALUE: 23
PIF_VALUE: 0
PIF_VALUE: 23
PIF_VALUE: 1
PIF_VALUE: 0
PIF_VALUE: 16
PIF_VALUE: 21
PIF_VALUE: 22
PIF_VALUE: 25
PIF_VALUE: 32
PIF_VALUE: 0
PIF_VALUE: 23
PIF_VALUE: 23
PIF_VALUE: 21
PIF_VALUE: 22
PIF_VALUE: 22
PIF_VALUE: 1
PIF_VALUE: 1
PIF_VALUE: 24
PIF_VALUE: 22
PIF_VALUE: 0
PIF_VALUE: 25
PIF_VALUE: 22
PIF_VALUE: 0
PIF_VALUE: 30
PIF_VALUE: 37
PIF_VALUE: 23
PIF_VALUE: 0
PIF_VALUE: 35
PIF_VALUE: 2
PIF_VALUE: 23
PIF_VALUE: 0
PIF_VALUE: 38
PIF_VALUE: 39
PIF_VALUE: 0
PIF_VALUE: 24
PIF_VALUE: 23
PIF_VALUE: 24
PIF_VALUE: 23
PIF_VALUE: 0
PIF_VALUE: 23
PIF_VALUE: 0
PIF_VALUE: 22
PIF_VALUE: 23
PIF_VALUE: 0
PIF_VALUE: 22
PIF_VALUE: 23
PIF_VALUE: 17
PIF_VALUE: 23
PIF_VALUE: 22
PIF_VALUE: 23
PIF_VALUE: 30
PIF_VALUE: 23
PIF_VALUE: 1
PIF_VALUE: 19
PIF_VALUE: 21
PIF_VALUE: 0
PIF_VALUE: 25
PIF_VALUE: 5
PIF_VALUE: 1
PIF_VALUE: 22
PIF_VALUE: 24
PIF_VALUE: 24
PIF_VALUE: 23
PIF_VALUE: 24
PIF_VALUE: 22
PIF_VALUE: 0
PIF_VALUE: 38
PIF_VALUE: 20
PIF_VALUE: 23
PIF_VALUE: 24
PIF_VALUE: 24
PIF_VALUE: 22
PIF_VALUE: 31
PIF_VALUE: 20
PIF_VALUE: 0
PIF_VALUE: 0
PIF_VALUE: 21
PIF_VALUE: 22
PIF_VALUE: 21
PIF_VALUE: 34
PIF_VALUE: 0
PIF_VALUE: 26
PIF_VALUE: 19
PIF_VALUE: 23
PIF_VALUE: 24
PIF_VALUE: 22
PIF_VALUE: 21
PIF_VALUE: 23
PIF_VALUE: 1
PIF_VALUE: 22
PIF_VALUE: 23
PIF_VALUE: 0
PIF_VALUE: 32
PIF_VALUE: 23
PIF_VALUE: 1
PIF_VALUE: 21
PIF_VALUE: 0
PIF_VALUE: 17
PIF_VALUE: 35
PIF_VALUE: 37
PIF_VALUE: 16
PIF_VALUE: 19
PIF_VALUE: 24
PIF_VALUE: 4
PIF_VALUE: 0
PIF_VALUE: 0
PIF_VALUE: 22
PIF_VALUE: 0
PIF_VALUE: 19
PIF_VALUE: 24
PIF_VALUE: 25
PIF_VALUE: 0
PIF_VALUE: 23
PIF_VALUE: 1
PIF_VALUE: 24
PIF_VALUE: 1
PIF_VALUE: 17
PIF_VALUE: 22
PIF_VALUE: 23
PIF_VALUE: 0
PIF_VALUE: 21
PIF_VALUE: 0
PIF_VALUE: 29
PIF_VALUE: 23
PIF_VALUE: 1
PIF_VALUE: 31
PIF_VALUE: 20
PIF_VALUE: 24
PIF_VALUE: 1
PIF_VALUE: 22
PIF_VALUE: 23
PIF_VALUE: 44
PIF_VALUE: 22
PIF_VALUE: 20
PIF_VALUE: 21
PIF_VALUE: 18
PIF_VALUE: 1
PIF_VALUE: 25
PIF_VALUE: 28
PIF_VALUE: 22
PIF_VALUE: 23
PIF_VALUE: 22
PIF_VALUE: 0
PIF_VALUE: 1
PIF_VALUE: 21
PIF_VALUE: 19
PIF_VALUE: 23
PIF_VALUE: 21
PIF_VALUE: 23
PIF_VALUE: 23
PIF_VALUE: 21
PIF_VALUE: 20
PIF_VALUE: 21
PIF_VALUE: 23
PIF_VALUE: 22
PIF_VALUE: 0
PIF_VALUE: 21
PIF_VALUE: 24
PIF_VALUE: 23
PIF_VALUE: 21
PIF_VALUE: 31
PIF_VALUE: 1
PIF_VALUE: 23
PIF_VALUE: 23
PIF_VALUE: 28
PIF_VALUE: 1
PIF_VALUE: 2
PIF_VALUE: 19
PIF_VALUE: 22
PIF_VALUE: 0
PIF_VALUE: 1
PIF_VALUE: 24
PIF_VALUE: 31
PIF_VALUE: 22
PIF_VALUE: 0
PIF_VALUE: 22
PIF_VALUE: 0
PIF_VALUE: 21
PIF_VALUE: 18
PIF_VALUE: 19
PIF_VALUE: 38
PIF_VALUE: 27
PIF_VALUE: 23
PIF_VALUE: 22
PIF_VALUE: 21
PIF_VALUE: 1
PIF_VALUE: 28
PIF_VALUE: 36
PIF_VALUE: 0
PIF_VALUE: 39
PIF_VALUE: 0
PIF_VALUE: 1
PIF_VALUE: 20
PIF_VALUE: 22
PIF_VALUE: 16
PIF_VALUE: 33
PIF_VALUE: 35
PIF_VALUE: 1
PIF_VALUE: 22
PIF_VALUE: 29
PIF_VALUE: 1
PIF_VALUE: 31
PIF_VALUE: 23
PIF_VALUE: 22
PIF_VALUE: 24
PIF_VALUE: 0
PIF_VALUE: 21
PIF_VALUE: 0
PIF_VALUE: 0
PIF_VALUE: 23
PIF_VALUE: 1
PIF_VALUE: 20
PIF_VALUE: 23
PIF_VALUE: 0
PIF_VALUE: 22
PIF_VALUE: 18
PIF_VALUE: 19
PIF_VALUE: 19
PIF_VALUE: 23
PIF_VALUE: 1
PIF_VALUE: 23
PIF_VALUE: 35
PIF_VALUE: 23
PIF_VALUE: 0
PIF_VALUE: 0
PIF_VALUE: 20
PIF_VALUE: 1
PIF_VALUE: 0
PIF_VALUE: 1
PIF_VALUE: 0
PIF_VALUE: 23
PIF_VALUE: 21
PIF_VALUE: 0
PIF_VALUE: 19
PIF_VALUE: 20
PIF_VALUE: 23
PIF_VALUE: 0
PIF_VALUE: 21
PIF_VALUE: 32
PIF_VALUE: 24
PIF_VALUE: 23
PIF_VALUE: 19
PIF_VALUE: 22
PIF_VALUE: 0
PIF_VALUE: 23
PIF_VALUE: 24
PIF_VALUE: 0
PIF_VALUE: 1
PIF_VALUE: 1
PIF_VALUE: 22
PIF_VALUE: 23
PIF_VALUE: 0
PIF_VALUE: 25
PIF_VALUE: 22
PIF_VALUE: 19
PIF_VALUE: 0
PIF_VALUE: 25
PIF_VALUE: 0
PIF_VALUE: 22
PIF_VALUE: 34
PIF_VALUE: 23
PIF_VALUE: 0
PIF_VALUE: 23
PIF_VALUE: 21
PIF_VALUE: 1
PIF_VALUE: 22
PIF_VALUE: 1
PIF_VALUE: 1
PIF_VALUE: 23
PIF_VALUE: 23
PIF_VALUE: 24
PIF_VALUE: 17
PIF_VALUE: 14
PIF_VALUE: 35
PIF_VALUE: 0
PIF_VALUE: 0
PIF_VALUE: 31
PIF_VALUE: 32
PIF_VALUE: 17
PIF_VALUE: 17
PIF_VALUE: 23
PIF_VALUE: 0
PIF_VALUE: 31
PIF_VALUE: 24
PIF_VALUE: 23
PIF_VALUE: 31
PIF_VALUE: 0
PIF_VALUE: 1
PIF_VALUE: 25
PIF_VALUE: 34
PIF_VALUE: 23
PIF_VALUE: 19
PIF_VALUE: 1
PIF_VALUE: 1
PIF_VALUE: 18
PIF_VALUE: 23
PIF_VALUE: 1
PIF_VALUE: 10
PIF_VALUE: 0
PIF_VALUE: 19
PIF_VALUE: 22
PIF_VALUE: 3
PIF_VALUE: 4
PIF_VALUE: 36
PIF_VALUE: 25
PIF_VALUE: 1
PIF_VALUE: 49
PIF_VALUE: 1
PIF_VALUE: 37
PIF_VALUE: 23
PIF_VALUE: 23
PIF_VALUE: 25
PIF_VALUE: 23
PIF_VALUE: 22
PIF_VALUE: 19
PIF_VALUE: 36
PIF_VALUE: 24
PIF_VALUE: 23
PIF_VALUE: 23
PIF_VALUE: 19
PIF_VALUE: 33
PIF_VALUE: 28
PIF_VALUE: 21
PIF_VALUE: 22
PIF_VALUE: 34
PIF_VALUE: 0
PIF_VALUE: 0
PIF_VALUE: 33
PIF_VALUE: 23
PIF_VALUE: 23
PIF_VALUE: 1
PIF_VALUE: 1
PIF_VALUE: 23
PIF_VALUE: 1
PIF_VALUE: 19
PIF_VALUE: 19
PIF_VALUE: 0
PIF_VALUE: 23
PIF_VALUE: 30
PIF_VALUE: 19
PIF_VALUE: 19
PIF_VALUE: 45
PIF_VALUE: 24
PIF_VALUE: 23
PIF_VALUE: 0
PIF_VALUE: 23
PIF_VALUE: 28
PIF_VALUE: 0
PIF_VALUE: 22
PIF_VALUE: 25
PIF_VALUE: 22
PIF_VALUE: 22
PIF_VALUE: 23
PIF_VALUE: 38
PIF_VALUE: 23
PIF_VALUE: 22
PIF_VALUE: 1
PIF_VALUE: 47
PIF_VALUE: 33
PIF_VALUE: 23
PIF_VALUE: 19
PIF_VALUE: 23
PIF_VALUE: 21
PIF_VALUE: 0
PIF_VALUE: 1
PIF_VALUE: 0
PIF_VALUE: 0
PIF_VALUE: 23
PIF_VALUE: 1
PIF_VALUE: 23
PIF_VALUE: 21
PIF_VALUE: 21
PIF_VALUE: 33
PIF_VALUE: 23
PIF_VALUE: 32
PIF_VALUE: 31
PIF_VALUE: 0
PIF_VALUE: 29
PIF_VALUE: 0
PIF_VALUE: 21
PIF_VALUE: 20
PIF_VALUE: 23
PIF_VALUE: 0
PIF_VALUE: 22
PIF_VALUE: 22
PIF_VALUE: 24
PIF_VALUE: 1
PIF_VALUE: 21
PIF_VALUE: 24
PIF_VALUE: 22
PIF_VALUE: 1
PIF_VALUE: 0
PIF_VALUE: 23
PIF_VALUE: 22
PIF_VALUE: 19
PIF_VALUE: 18
PIF_VALUE: 24
PIF_VALUE: 0
PIF_VALUE: 37
PIF_VALUE: 1
PIF_VALUE: 23
PIF_VALUE: 0
PIF_VALUE: 23
PIF_VALUE: 23
PIF_VALUE: 31
PIF_VALUE: 38
PIF_VALUE: 23
PIF_VALUE: 1
PIF_VALUE: 21
PIF_VALUE: 16
PIF_VALUE: 22
PIF_VALUE: 19
PIF_VALUE: 23
PIF_VALUE: 25
PIF_VALUE: 22
PIF_VALUE: 34
PIF_VALUE: 22
PIF_VALUE: 0
PIF_VALUE: 22
PIF_VALUE: 0
PIF_VALUE: 19
PIF_VALUE: 48
PIF_VALUE: 21
PIF_VALUE: 20
PIF_VALUE: 21
PIF_VALUE: 0
PIF_VALUE: 22
PIF_VALUE: 1
PIF_VALUE: 17
PIF_VALUE: 0
PIF_VALUE: 25
PIF_VALUE: 23
PIF_VALUE: 21
PIF_VALUE: 0
PIF_VALUE: 1
PIF_VALUE: 23
PIF_VALUE: 0
PIF_VALUE: 0
PIF_VALUE: 22
PIF_VALUE: 0
PIF_VALUE: 22
PIF_VALUE: 23
PIF_VALUE: 27
PIF_VALUE: 0
PIF_VALUE: 23
PIF_VALUE: 21
PIF_VALUE: 18
PIF_VALUE: 24
PIF_VALUE: 22
PIF_VALUE: 0
PIF_VALUE: 23
PIF_VALUE: 2
PIF_VALUE: 23
PIF_VALUE: 24
PIF_VALUE: 0
PIF_VALUE: 22
PIF_VALUE: 18
PIF_VALUE: 23
PIF_VALUE: 1
PIF_VALUE: 23
PIF_VALUE: 21
PIF_VALUE: 22
PIF_VALUE: 18
PIF_VALUE: 23
PIF_VALUE: 23
PIF_VALUE: 37
PIF_VALUE: 22
PIF_VALUE: 23
PIF_VALUE: 23
PIF_VALUE: 22
PIF_VALUE: 24
PIF_VALUE: 20
PIF_VALUE: 0
PIF_VALUE: 17
PIF_VALUE: 22
PIF_VALUE: 21
PIF_VALUE: 17
PIF_VALUE: 23
PIF_VALUE: 20
PIF_VALUE: 21
PIF_VALUE: 1
PIF_VALUE: 22
PIF_VALUE: 23
PIF_VALUE: 1
PIF_VALUE: 22
PIF_VALUE: 0
PIF_VALUE: 21
PIF_VALUE: 20
PIF_VALUE: 0
PIF_VALUE: 21
PIF_VALUE: 31
PIF_VALUE: 21
PIF_VALUE: 1
PIF_VALUE: 24
PIF_VALUE: 25
PIF_VALUE: 21
PIF_VALUE: 39
PIF_VALUE: 23
PIF_VALUE: 26
PIF_VALUE: 21
PIF_VALUE: 32
PIF_VALUE: 1
PIF_VALUE: 1
PIF_VALUE: 35
PIF_VALUE: 17
PIF_VALUE: 36
PIF_VALUE: 25
PIF_VALUE: 23
PIF_VALUE: 24
PIF_VALUE: 1
PIF_VALUE: 22
PIF_VALUE: 24
PIF_VALUE: 0
PIF_VALUE: 20
PIF_VALUE: 0
PIF_VALUE: 22
PIF_VALUE: 36
PIF_VALUE: 0
PIF_VALUE: 0
PIF_VALUE: 22
PIF_VALUE: 1
PIF_VALUE: 1
PIF_VALUE: 24
PIF_VALUE: 0
PIF_VALUE: 28
PIF_VALUE: 1
PIF_VALUE: 21
PIF_VALUE: 23
PIF_VALUE: 22
PIF_VALUE: 25
PIF_VALUE: 21
PIF_VALUE: 21
PIF_VALUE: 22
PIF_VALUE: 24
PIF_VALUE: 23
PIF_VALUE: 22
PIF_VALUE: 17
PIF_VALUE: 0
PIF_VALUE: 0
PIF_VALUE: 38
PIF_VALUE: 19
PIF_VALUE: 24
PIF_VALUE: 21
PIF_VALUE: 25
PIF_VALUE: 23
PIF_VALUE: 29
PIF_VALUE: 25
PIF_VALUE: 22
PIF_VALUE: 18
PIF_VALUE: 22
PIF_VALUE: 23
PIF_VALUE: 27
PIF_VALUE: 45
PIF_VALUE: 23
PIF_VALUE: 1
PIF_VALUE: 19
PIF_VALUE: 34
PIF_VALUE: 22
PIF_VALUE: 17
PIF_VALUE: 0
PIF_VALUE: 24
PIF_VALUE: 23
PIF_VALUE: 1
PIF_VALUE: 22
PIF_VALUE: 23
PIF_VALUE: 19
PIF_VALUE: 19
PIF_VALUE: 24
PIF_VALUE: 21
PIF_VALUE: 22
PIF_VALUE: 0
PIF_VALUE: 23
PIF_VALUE: 22
PIF_VALUE: 24
PIF_VALUE: 23
PIF_VALUE: 17
PIF_VALUE: 35
PIF_VALUE: 43
PIF_VALUE: 22
PIF_VALUE: 24
PIF_VALUE: 23
PIF_VALUE: 28
PIF_VALUE: 22
PIF_VALUE: 0
PIF_VALUE: 23
PIF_VALUE: 22
PIF_VALUE: 31
PIF_VALUE: 0
PIF_VALUE: 1
PIF_VALUE: 16
PIF_VALUE: 22
PIF_VALUE: 0
PIF_VALUE: 19
PIF_VALUE: 0
PIF_VALUE: 23
PIF_VALUE: 26
PIF_VALUE: 1
PIF_VALUE: 24
PIF_VALUE: 23
PIF_VALUE: 34
PIF_VALUE: 21
PIF_VALUE: 24
PIF_VALUE: 21
PIF_VALUE: 24
PIF_VALUE: 36
PIF_VALUE: 19
PIF_VALUE: 20
PIF_VALUE: 0
PIF_VALUE: 24
PIF_VALUE: 0
PIF_VALUE: 23
PIF_VALUE: 0
PIF_VALUE: 0
PIF_VALUE: 21
PIF_VALUE: 0
PIF_VALUE: 21
PIF_VALUE: 21
PIF_VALUE: 26
PIF_VALUE: 0
PIF_VALUE: 24
PIF_VALUE: 1
PIF_VALUE: 24
PIF_VALUE: 28
PIF_VALUE: 1
PIF_VALUE: 28
PIF_VALUE: 21
PIF_VALUE: 1
PIF_VALUE: 39
PIF_VALUE: 17
PIF_VALUE: 21
PIF_VALUE: 22
PIF_VALUE: 23
PIF_VALUE: 20
PIF_VALUE: 23
PIF_VALUE: 1
PIF_VALUE: 21
PIF_VALUE: 0
PIF_VALUE: 24
PIF_VALUE: 46
PIF_VALUE: 1
PIF_VALUE: 0
PIF_VALUE: 49
PIF_VALUE: 0
PIF_VALUE: 0
PIF_VALUE: 24
PIF_VALUE: 20
PIF_VALUE: 25
PIF_VALUE: 22
PIF_VALUE: 21
PIF_VALUE: 23
PIF_VALUE: 0
PIF_VALUE: 31
PIF_VALUE: 25
PIF_VALUE: 24
PIF_VALUE: 23
PIF_VALUE: 23
PIF_VALUE: 0
PIF_VALUE: 19
PIF_VALUE: 22
PIF_VALUE: 23
PIF_VALUE: 23
PIF_VALUE: 0
PIF_VALUE: 42
PIF_VALUE: 1
PIF_VALUE: 23
PIF_VALUE: 1
PIF_VALUE: 23
PIF_VALUE: 22
PIF_VALUE: 19
PIF_VALUE: 22
PIF_VALUE: 1
PIF_VALUE: 0
PIF_VALUE: 24
PIF_VALUE: 0
PIF_VALUE: 23
PIF_VALUE: 1
PIF_VALUE: 23
PIF_VALUE: 26
PIF_VALUE: 27
PIF_VALUE: 21
PIF_VALUE: 17
PIF_VALUE: 0
PIF_VALUE: 24
PIF_VALUE: 23
PIF_VALUE: 1
PIF_VALUE: 1
PIF_VALUE: 24
PIF_VALUE: 23
PIF_VALUE: 19
PIF_VALUE: 22
PIF_VALUE: 23
PIF_VALUE: 23
PIF_VALUE: 21
PIF_VALUE: 23
PIF_VALUE: 1
PIF_VALUE: 21
PIF_VALUE: 1
PIF_VALUE: 0
PIF_VALUE: 0
PIF_VALUE: 47
PIF_VALUE: 21
PIF_VALUE: 19
PIF_VALUE: 16
PIF_VALUE: 20
PIF_VALUE: 23
PIF_VALUE: 23
PIF_VALUE: 18
PIF_VALUE: 41
PIF_VALUE: 2
PIF_VALUE: 23
PIF_VALUE: 0
PIF_VALUE: 25
PIF_VALUE: 22
PIF_VALUE: 0
PIF_VALUE: 24
PIF_VALUE: 23
PIF_VALUE: 23
PIF_VALUE: 20
PIF_VALUE: 23
PIF_VALUE: 17
PIF_VALUE: 38
PIF_VALUE: 20
PIF_VALUE: 17
PIF_VALUE: 22
PIF_VALUE: 24
PIF_VALUE: 21
PIF_VALUE: 23
PIF_VALUE: 31
PIF_VALUE: 0
PIF_VALUE: 22
PIF_VALUE: 0
PIF_VALUE: 24
PIF_VALUE: 1
PIF_VALUE: 0
PIF_VALUE: 31
PIF_VALUE: 1
PIF_VALUE: 26
PIF_VALUE: 23
PIF_VALUE: 17
PIF_VALUE: 0
PIF_VALUE: 1
PIF_VALUE: 16
PIF_VALUE: 1
PIF_VALUE: 24
PIF_VALUE: 20
PIF_VALUE: 42
PIF_VALUE: 0
PIF_VALUE: 17
PIF_VALUE: 22
PIF_VALUE: 0
PIF_VALUE: 47
PIF_VALUE: 24
PIF_VALUE: 21
PIF_VALUE: 1
PIF_VALUE: 35
PIF_VALUE: 39
PIF_VALUE: 0
PIF_VALUE: 1
PIF_VALUE: 36
PIF_VALUE: 0
PIF_VALUE: 21
PIF_VALUE: 23
PIF_VALUE: 25
PIF_VALUE: 24
PIF_VALUE: 32
PIF_VALUE: 1
PIF_VALUE: 0
PIF_VALUE: 23
PIF_VALUE: 32
PIF_VALUE: 24
PIF_VALUE: 0
PIF_VALUE: 0
PIF_VALUE: 24
PIF_VALUE: 0
PIF_VALUE: 23
PIF_VALUE: 24
PIF_VALUE: 0
PIF_VALUE: 0
PIF_VALUE: 22
PIF_VALUE: 22
PIF_VALUE: 25
PIF_VALUE: 21
PIF_VALUE: 20
PIF_VALUE: 0
PIF_VALUE: 24
PIF_VALUE: 23
PIF_VALUE: 21
PIF_VALUE: 1
PIF_VALUE: 18
PIF_VALUE: 0
PIF_VALUE: 24
PIF_VALUE: 24
PIF_VALUE: 0
PIF_VALUE: 16
PIF_VALUE: 24
PIF_VALUE: 0
PIF_VALUE: 34
PIF_VALUE: 0
PIF_VALUE: 0
PIF_VALUE: 23
PIF_VALUE: 22
PIF_VALUE: 21
PIF_VALUE: 19
PIF_VALUE: 36
PIF_VALUE: 18
PIF_VALUE: 0
PIF_VALUE: 24
PIF_VALUE: 20
PIF_VALUE: 43
PIF_VALUE: 18
PIF_VALUE: 1
PIF_VALUE: 23
PIF_VALUE: 1
PIF_VALUE: 23
PIF_VALUE: 33
PIF_VALUE: 0
PIF_VALUE: 22
PIF_VALUE: 24
PIF_VALUE: 1
PIF_VALUE: 43
PIF_VALUE: 1
PIF_VALUE: 50
PIF_VALUE: 22
PIF_VALUE: 18
PIF_VALUE: 22
PIF_VALUE: 22
PIF_VALUE: 23
PIF_VALUE: 1
PIF_VALUE: 0
PIF_VALUE: 35
PIF_VALUE: 22
PIF_VALUE: 24
PIF_VALUE: 21
PIF_VALUE: 1
PIF_VALUE: 45
PIF_VALUE: 23
PIF_VALUE: 0
PIF_VALUE: 1
PIF_VALUE: 34
PIF_VALUE: 23
PIF_VALUE: 25
PIF_VALUE: 22
PIF_VALUE: 0
PIF_VALUE: 22
PIF_VALUE: 17
PIF_VALUE: 21
PIF_VALUE: 22
PIF_VALUE: 20
PIF_VALUE: 35
PIF_VALUE: 24
PIF_VALUE: 33
PIF_VALUE: 24
PIF_VALUE: 24
PIF_VALUE: 23
PIF_VALUE: 0
PIF_VALUE: 23
PIF_VALUE: 0
PIF_VALUE: 1
PIF_VALUE: 30
PIF_VALUE: 23
PIF_VALUE: 22
PIF_VALUE: 1
PIF_VALUE: 28
PIF_VALUE: 1
PIF_VALUE: 1
PIF_VALUE: 24
PIF_VALUE: 32
PIF_VALUE: 22
PIF_VALUE: 0
PIF_VALUE: 22

## 2020-01-01 ASSESSMENT — PAIN SCALES - GENERAL
PAINLEVEL_OUTOF10: 0
PAINLEVEL_OUTOF10: 2
PAINLEVEL_OUTOF10: 0
PAINLEVEL_OUTOF10: 2
PAINLEVEL_OUTOF10: 0
PAINLEVEL_OUTOF10: 7
PAINLEVEL_OUTOF10: 0
PAINLEVEL_OUTOF10: 7
PAINLEVEL_OUTOF10: 0
PAINLEVEL_OUTOF10: 0
PAINLEVEL_OUTOF10: 3
PAINLEVEL_OUTOF10: 0
PAINLEVEL_OUTOF10: 3
PAINLEVEL_OUTOF10: 0
PAINLEVEL_OUTOF10: 1
PAINLEVEL_OUTOF10: 0
PAINLEVEL_OUTOF10: 4
PAINLEVEL_OUTOF10: 0
PAINLEVEL_OUTOF10: 8

## 2020-01-01 ASSESSMENT — ENCOUNTER SYMPTOMS
SHORTNESS OF BREATH: 1
SHORTNESS OF BREATH: 1
ABDOMINAL PAIN: 0
SHORTNESS OF BREATH: 1
EYE PAIN: 0
NAUSEA: 0
ABDOMINAL DISTENTION: 0
COUGH: 0
COLOR CHANGE: 0
NAUSEA: 0
ABDOMINAL PAIN: 0
DIARRHEA: 0
DYSPNEA ACTIVITY LEVEL: AFTER AMBULATING 1 FLIGHT OF STAIRS
EYE REDNESS: 0
VOMITING: 0
COUGH: 0
VOMITING: 0
COUGH: 0
BACK PAIN: 0
SHORTNESS OF BREATH: 1
DIARRHEA: 0
CONSTIPATION: 0
DYSPNEA ACTIVITY LEVEL: AFTER AMBULATING 1 FLIGHT OF STAIRS
PHOTOPHOBIA: 0
SHORTNESS OF BREATH: 1
COLOR CHANGE: 0
RHINORRHEA: 0
DIARRHEA: 0
CHEST TIGHTNESS: 0
SHORTNESS OF BREATH: 1
DYSPNEA ACTIVITY LEVEL: AFTER AMBULATING 1 FLIGHT OF STAIRS
NAUSEA: 0
FACIAL SWELLING: 0
CONSTIPATION: 0
SORE THROAT: 0
WHEEZING: 0
RHINORRHEA: 0

## 2020-01-01 ASSESSMENT — PAIN DESCRIPTION - PAIN TYPE
TYPE: ACUTE PAIN

## 2020-01-01 ASSESSMENT — PAIN DESCRIPTION - DESCRIPTORS
DESCRIPTORS: TIGHTNESS;ACHING
DESCRIPTORS: HEAVINESS
DESCRIPTORS: HEAVINESS

## 2020-01-01 ASSESSMENT — PAIN DESCRIPTION - LOCATION
LOCATION: OTHER (COMMENT)
LOCATION: CHEST
LOCATION: CHEST
LOCATION: HAND
LOCATION: HAND

## 2020-01-01 ASSESSMENT — PAIN DESCRIPTION - ORIENTATION
ORIENTATION: RIGHT
ORIENTATION: RIGHT

## 2020-01-01 ASSESSMENT — PAIN - FUNCTIONAL ASSESSMENT: PAIN_FUNCTIONAL_ASSESSMENT: ACTIVITIES ARE NOT PREVENTED

## 2020-01-01 ASSESSMENT — PAIN DESCRIPTION - FREQUENCY
FREQUENCY: CONTINUOUS
FREQUENCY: CONTINUOUS

## 2020-04-28 PROBLEM — R07.9 CHEST PAIN: Status: ACTIVE | Noted: 2020-01-01

## 2020-04-28 PROBLEM — R07.9 CHEST PAIN, UNSPECIFIED: Status: ACTIVE | Noted: 2020-01-01

## 2020-04-28 NOTE — ED PROVIDER NOTES
reactive to light. Neck:      Musculoskeletal: Normal range of motion and neck supple. Cardiovascular:      Rate and Rhythm: Normal rate and regular rhythm. Pulmonary:      Effort: No respiratory distress. Breath sounds: Normal breath sounds. No wheezing or rales. Comments: While in the room the patient is tachypnic with a RR of 21-24. Abdominal:      General: Bowel sounds are normal.      Palpations: Abdomen is soft. Tenderness: There is no abdominal tenderness. There is no guarding or rebound. Skin:     General: Skin is warm and dry. Neurological:      Mental Status: She is alert and oriented to person, place, and time. Cranial Nerves: No cranial nerve deficit. Coordination: Coordination normal.             --------------------------------------------- PAST HISTORY ---------------------------------------------  Past Medical History:  has a past medical history of Acid reflux, Asthma, Chronic back pain, Hyperlipidemia, Hypertension, and Thyroid disease. Past Surgical History:  has a past surgical history that includes bladder repair; knee surgery; Tonsillectomy; hernia repair; Colonoscopy; back surgery (06/2015); Intracapsular cataract extraction (Bilateral, 2016, 6/2018); and endoscopic ultrasound (upper) (08/2018). Social History:  reports that she has never smoked. She has never used smokeless tobacco. She reports that she does not drink alcohol or use drugs. Family History: family history includes Heart Attack (age of onset: 76) in her father; Heart Disease (age of onset: 37) in her mother; Other in her sister; Stroke (age of onset: 61) in her sister; Stroke (age of onset: 79) in her sister. The patients home medications have been reviewed. Allergies: Medrol [methylprednisolone];  Biaxin [clarithromycin]; and Pcn [penicillins]    -------------------------------------------------- RESULTS -------------------------------------------------    LABS:  Results for

## 2020-04-29 NOTE — CARE COORDINATION
4/29 CM Note: spoke with Basilio Hayes, nurse, and informed her that patient is eligible for 30 days free eliquis thru our [address] [facility] outpatient pharmacy. Instructions given for face sheet, script, and coupon to be delivered to the outpatient pharmacy to be filled. Spoke to Vinay at the outpatient pharmacy at ext [phone] and she is waiting for the paperwork to fill the script for the patient.

## 2020-04-29 NOTE — CONSULTS
ending 20 1255  Vent Information  SpO2: 96 %                Physical Exam:  General: The patient is lying in bed comfortably without any distress. Breathing is not labored  HEENT: Pupils are equal round and reactive to light, there are no oral lesions and no post-nasal drip   Neck: supple without adenopathy  Cardiovascular: regular rate and rhythm without murmur or gallop  Respiratory: Clear to auscultation bilaterally without wheezing or crackles. Air entry is symmetric  Abdomen: soft, non-tender, non-distended, normal bowel sounds  Extremities: warm, no edema, no clubbing  Skin: no rash or lesion  Neurologic: CN II-XII grossly intact, no focal deficits    Pulmonary Function Testing personally reviewed and interpreted   Results for Diana Maciel (MRN [id number]) as of 2020 12:55   Ref. Range 7/10/2017 00:00   DLCO %Pred Unknown Pend   DLCO/VA %Pred Unknown Pend   FEF 25-75% PRE PRED Unknown 104   FEF 25-75%-Pre Unknown 2.08   FEV1 %Pred-Pre Unknown 106   FEV1/FVC Pred Unknown 98   FVC %Pred-Pre Unknown 104   PEF %Pred-Pre Unknown Pend   PEF-Pre Latest Units: L/sec Pend   TLC %Pred Unknown Pend       Imaging personally reviewed:  Patient MRN:  [id number]   : 1945   Age: 77 years   Gender: Female       Order Date:  2020 3:45 PM       EXAM: CTA PULMONARY W CONTRAST       INDICATION:  SOB . Patient states problem started yesterday, no fever   or cough. Constant midsternal chest pain.       TECHNIQUE: Axial images from the lung apices to below the diaphragm   with IV contrast. Axial, sagittal and coronal 2-D reconstructions in   soft tissue windows. Axial lung windows. Dose reduction techniques   were used.       Contrast is 60 mL Isovue-370 IV.       Dose is total  MG Y CM.        COMPARISON: Portable chest 2020 at 1518 hours. CT abdomen and   pelvis with contrast 2019.       FINDINGS:     There is no large or central pulmonary embolism.  One tiny peripheral   vessel in the medial right lower lobe and 2 tiny peripheral vessels in   the left lower lobe do not opacify well with contrast, cannot exclude   small peripheral emboli.       There is no infiltrate, atelectasis, effusion or pneumothorax. Central   airways are clear. Heart size slightly increased. Mild CAD. Mild   calcification of the aorta, no aneurysm or dissection. No mediastinum   hematomas or pericardial effusion. Great vessels are patent with mild   calcified stenosis at the origin of the left subclavian artery. Small   hilar and subcarinal lymph nodes. No significant adenopathy.       A chronic very small hiatal hernia. Esophagus is nondistended. Thyroid   is small in size. Tiny calcified nodule in the left thyroid.       ABDOMEN: Visualized upper abdomen does not show gross abnormality.       Bones: No compression fracture deformity. Moderate degenerative   spurring in the lower cervical spine. Mild spurring in the midthoracic   spine.           Impression       1. There are a few tiny lower lobe peripheral vessels which do not   opacify fully with contrast, cannot exclude tiny peripheral PEs.     2. No central or large PE.       3. No infiltrates or effusion.    4. Chronic small hiatal hernia.                     Echo:  4/29/2020  Normal aortic root and ascending aorta.   Miscellaneous   The inferior vena cava diameter is normal with normal respiratory   variation.      Conclusions      Summary   Ejection fraction is visually estimated at 65%.   No regional wall motion abnormalities seen.   Normal right ventricle structure and function.   Physiologic and/or trace mitral regurgitation is present.      Signature      ----------------------------------------------------------------    Labs:  Lab Results   Component Value Date    WBC 8.8 04/29/2020    HGB 14.4 04/29/2020    HCT 44.1 04/29/2020    MCV 94.2 04/29/2020    MCH 30.8 04/29/2020    MCHC 32.7 04/29/2020    RDW 14.0 04/29/2020     04/29/2020    MPV 11.3 04/29/2020

## 2020-04-29 NOTE — H&P
[address]                  [address] [facility],  [facility]                              HISTORY AND PHYSICAL    PATIENT NAME: Bethany Oropeza                     :        1945  MED REC NO:   [id number]                            ROOM:       1471  ACCOUNT NO:   [de-identified]                           ADMIT DATE: 2020  PROVIDER:     Alyssia Morel DO    CHIEF COMPLAINT/REASON FOR THIS HOSPITAL ADMISSION:  Shortness of breath  coupled with CTA of the chest, which cannot exclude a tiny peripheral  PE. HISTORY OF PRESENT ILLNESS:  The patient is a 69-year-old hypertensive  female with past medical history significant for acid reflux,  intermittent asthma, seasonal allergies, hypertension, hyperlipidemia,  and hypothyroidism, who presents to this hospital emergency room with  shortness of breath. Her last stress test was by Dr. Thaddeus Oppenheim back in  . Symptoms began the day prior. Her shortness of breath is  worsened with movement and activity. Upon arrival to this hospital  emergency room, her white blood cell count was 9, H and H of 15.4 and  46.3 respectively, platelets were 506,760. Blood sugar was 114, BUN 15,  creatinine 1.0. Liver functions were within normal limits. Potassium  was 4.0. Troponin was less than 0.01.  UA was negative. CTA of the  chest reveals no evidence for PE, chronic small hiatal hernia, cannot  exclude tiny peripheral PEs. Chest x-ray was unremarkable. Her initial  BP is 171/89. The patient denies any cough. She was afebrile. She  denies any myalgias, sweats, or chills. No loss of sense of smell. She  was admitted, placed on subcu Lovenox. Venous ultrasound ordered of  both lower extremities with Pulmonary consultations requested. She did  not appear to be hypoxic. MEDICATIONS:  Her recent and present medications are noted.     PAST SURGICAL HISTORY:  Consistent with prior bladder repair, knee  surgery,

## 2020-04-29 NOTE — PROGRESS NOTES
CLINICAL PHARMACY NOTE: MEDS TO [address] Select Patient?: No  Total # of Prescriptions Filled: 2   The following medications were delivered to the patient:  · ELIQUIS STARTER   · HYDRALAZINE HCL 50 MG   Total # of Interventions Completed: 3  Time Spent (min): 15    Additional Documentation:

## 2020-05-01 NOTE — DISCHARGE SUMMARY
echo  during her hospital stay, which revealed a normal ejection fraction with  normal right ventricle structure and function. Eventually, she was  transitioned over to Eliquis 10 mg b.i.d. for seven days to be followed  by Eliquis 5 mg b.i.d. thereafter. At the time of her discharge, she  was ambulating with little dyspnea, tolerating p.o. diet well. She had  no chest pain or heaviness. No difficulty breathing and she was  subsequently discharged home in satisfactory condition. PHYSICIANS FOLLOWING THE PATIENT DURING THIS HOSPITAL STAY:  Dr. Janneth David,  from Pulmonary Services. CONDITION ON DISCHARGE:  Level of function is good. UNREPORTED TEST RESULTS AND INTENDED FOLLOWUP:  The patient will follow  up with me accordingly and Pulmonary in eight weeks. DISCHARGE DIET:  Includes low-fat, low-cholesterol, 2 gm sodium diet. DISCHARGE MEDICATIONS:  As follows,  1. Eliquis 10 mg b.i.d. x7 days followed by Eliquis 5 mg b.i.d.  thereafter until further ordered. 2.  Symbicort 80/4.5 two inhalations b.i.d.  3.  Hydralazine 50 mg t.i.d.  4.  Pravachol 20 mg q.h.s.  5.  Neurontin 100 mg q.h.s.  6.  Singulair 10 mg daily. 7.  Norvasc 5 mg daily. 8.  Synthroid 25 mcg daily. 9.  Claritin 10 mg daily. 10.  Mobic 7.5 mg daily. 11.  Prilosec 40 mg b.i.d.  12.  Vitamin D 1000 IU daily. 13.  Probiotic one daily. 14.  ProAir HFA inhaler two puffs q. four to six p.r.n. FOLLOWUP:  Followup will be in my office in one week. Followup with  Pulmonary accordingly.         Selena Leslie DO    D: 04/30/2020 7:12:28       T: 04/30/2020 7:20:35     [id number]  Job#: [id number]     Doc#: [id number]    CC:

## 2020-05-12 PROBLEM — I26.99 ACUTE PULMONARY EMBOLISM WITHOUT ACUTE COR PULMONALE (HCC): Status: ACTIVE | Noted: 2020-01-01

## 2020-05-20 NOTE — ED PROVIDER NOTES
Natriuretic Peptide   Result Value Ref Range    Pro- 0 - 450 pg/mL   Troponin   Result Value Ref Range    Troponin <0.01 0.00 - 0.03 ng/mL       Radiology:  CTA CHEST W CONTRAST   Final Result   No pulmonary embolism. No acute thoracic disease. This report has been electronically signed by Erika Bae MD.      XR CHEST PORTABLE    (Results Pending)       ------------------------- NURSING NOTES AND VITALS REVIEWED ---------------------------  Date / Time Roomed:  5/20/2020 12:56 AM  ED Bed Assignment:  23/23    The nursing notes within the ED encounter and vital signs as below have been reviewed. BP (!) 164/67   Pulse 106   Temp 98.2 °F (36.8 °C) (Oral)   Resp 18   Ht 5' 1\" (1.549 m)   Wt 137 lb (62.1 kg)   SpO2 96%   BMI 25.89 kg/m²   Oxygen Saturation Interpretation: Normal      ------------------------------------------ PROGRESS NOTES ------------------------------------------  ED Course as of May 20 0346   Wed May 20, 2020   0317 Patient states she feels a little shaky. Could be related to the duoneb treatment. Patient was asking about COVID testing. Will obtain testing here, and then have patient discharged. [KS]      ED Course User Index  [KS] Carmen Donald DO         3:46 AM EDT  I have spoken with the patient and discussed todays results, in addition to providing specific details for the plan of care and counseling regarding the diagnosis and prognosis. Their questions are answered at this time and they are agreeable with the plan. I discussed at length with them reasons for immediate return here for re evaluation. They will followup with their primary care physician by calling their office on Monday.      --------------------------------- ADDITIONAL PROVIDER NOTES ---------------------------------  At this time the patient is without objective evidence of an acute process requiring hospitalization or inpatient management.   They have remained hemodynamically stable throughout their

## 2020-05-22 NOTE — CARE COORDINATION
Patient contacted regarding COVID-19   DX:  Dyspnea. Care Transition Nurse contacted the patient by telephone to perform post discharge assessment. Verified name and  with patient as identifiers. Provided introduction to self, and explanation of the CTN role, and reason for call due to risk factors for infection and/or exposure to COVID-19. Symptoms reviewed with patient who verbalized the following symptoms: fatigue and shortness of breath. Due to worsening symptoms encounter was routed to provider for escalation. Patient has following risk factors of: asthma. CTN reviewed discharge instructions, medical action plan and red flags such as increased shortness of breath, increasing fever and signs of decompensation with patient who verbalized understanding. Discussed exposure protocols and quarantine with CDC Guidelines What to do if you are sick with coronavirus disease .  Patient was given an opportunity for questions and concerns. The patient agrees to contact the [facility] exposure line [phone], [facility] department [place] [facility]: ([phone]) and PCP office for questions related to their healthcare. CTN provided contact information for future needs. Reviewed and educated patient on any new and changed medications related to discharge diagnosis     Patientgiven information for [facility] and agrees to enroll no     Patient reports she has follow up appointment with Dr. Katia Carney this morning. CTN provided patient with contact number for [facility] My Chart Help Desk:  [phone]    Patient will continue to follow with PCP; no further follow up needed from CTN as per patient. Emotional support provided.

## 2020-05-23 NOTE — ED PROVIDER NOTES
the ED revealed slight hypokalemia for which patient was given oral potassium; there is no single sided leg swelling. Patient has no back pain / urine incontinence. Good distal pulses. Patient continues to be non-toxic on re-evaluation. Findings were discussed with the patient and reasons to immediately return to the ED were articulated to them. They will follow-up with their PCP, Dr. Jacky Dillard.    --------------------------------------------- PAST HISTORY ---------------------------------------------  Past Medical History:  has a past medical history of Acid reflux, Asthma, Chronic back pain, Hyperlipidemia, Hypertension, and Thyroid disease. Past Surgical History:  has a past surgical history that includes bladder repair; knee surgery; Tonsillectomy; hernia repair; Colonoscopy; back surgery (06/2015); Intracapsular cataract extraction (Bilateral, 2016, 6/2018); and endoscopic ultrasound (upper) (08/2018). Social History:  reports that she has never smoked. She has never used smokeless tobacco. She reports that she does not drink alcohol or use drugs. Family History: family history includes Heart Attack (age of onset: 76) in her father; Heart Disease (age of onset: 37) in her mother; Other in her sister; Stroke (age of onset: 61) in her sister; Stroke (age of onset: 79) in her sister. The patients home medications have been reviewed. Allergies: Medrol [methylprednisolone];  Biaxin [clarithromycin]; and Pcn [penicillins]    -------------------------------------------------- RESULTS -------------------------------------------------  Labs:  Results for orders placed or performed during the hospital encounter of 05/23/20   CBC Auto Differential   Result Value Ref Range    WBC 8.6 4.5 - 11.5 E9/L    RBC 4.50 3.50 - 5.50 E12/L    Hemoglobin 14.1 11.5 - 15.5 g/dL    Hematocrit 42.7 34.0 - 48.0 %    MCV 94.9 80.0 - 99.9 fL    MCH 31.3 26.0 - 35.0 pg    MCHC 33.0 32.0 - 34.5 %    RDW 13.1 11.5 - 15.0 fL Platelets 902 506 - 384 E9/L    MPV 10.7 7.0 - 12.0 fL    Neutrophils % 73.2 43.0 - 80.0 %    Immature Granulocytes % 0.5 0.0 - 5.0 %    Lymphocytes % 13.5 (L) 20.0 - 42.0 %    Monocytes % 8.0 2.0 - 12.0 %    Eosinophils % 4.1 0.0 - 6.0 %    Basophils % 0.7 0.0 - 2.0 %    Neutrophils Absolute 6.28 1.80 - 7.30 E9/L    Immature Granulocytes # 0.04 E9/L    Lymphocytes Absolute 1.16 (L) 1.50 - 4.00 E9/L    Monocytes Absolute 0.69 0.10 - 0.95 E9/L    Eosinophils Absolute 0.35 0.05 - 0.50 E9/L    Basophils Absolute 0.06 0.00 - 0.20 J6/Y   Basic Metabolic Panel w/ Reflex to MG   Result Value Ref Range    Sodium 140 132 - 146 mmol/L    Potassium reflex Magnesium 3.3 (L) 3.5 - 5.0 mmol/L    Chloride 101 98 - 107 mmol/L    CO2 23 22 - 29 mmol/L    Anion Gap 16 7 - 16 mmol/L    Glucose 121 (H) 74 - 99 mg/dL    BUN 13 8 - 23 mg/dL    CREATININE 1.0 0.5 - 1.0 mg/dL    GFR Non-African American 54 >=60 mL/min/1.73    GFR African American >60     Calcium 9.4 8.6 - 10.2 mg/dL   Troponin   Result Value Ref Range    Troponin <0.01 0.00 - 0.03 ng/mL   Brain Natriuretic Peptide   Result Value Ref Range    Pro- 0 - 450 pg/mL   Urinalysis, reflex to microscopic   Result Value Ref Range    Color, UA Yellow Straw/Yellow    Clarity, UA Clear Clear    Glucose, Ur Negative Negative mg/dL    Bilirubin Urine Negative Negative    Ketones, Urine Negative Negative mg/dL    Specific Gravity, UA <=1.005 1.005 - 1.030    Blood, Urine Negative Negative    pH, UA 6.0 5.0 - 9.0    Protein, UA Negative Negative mg/dL    Urobilinogen, Urine 0.2 <2.0 E.U./dL    Nitrite, Urine Negative Negative    Leukocyte Esterase, Urine SMALL (A) Negative   Magnesium   Result Value Ref Range    Magnesium 2.1 1.6 - 2.6 mg/dL   EKG 12 Lead   Result Value Ref Range    Ventricular Rate 78 BPM    Atrial Rate 78 BPM    P-R Interval 158 ms    QRS Duration 82 ms    Q-T Interval 400 ms    QTc Calculation (Bazett) 456 ms    P Axis 82 degrees    R Axis 56 degrees    T Axis 30

## 2020-05-26 PROBLEM — R09.02 HYPOXIA: Status: ACTIVE | Noted: 2020-01-01

## 2020-05-26 NOTE — CONSULTS
Inpatient Cardiology Consultation      Reason for Consult: ANG out of proportion to any lung disease    Consulting Physician: Dr. Romano Printers    Requesting Physician:  Dr. Ben Trujillo    Date of Consultation: 5/26/2020    HISTORY OF PRESENT ILLNESS:   Ms. Celi Archibald 76 y/o female who was previously known to Dr. Charlie Pathak; most recently evaluated in IP consultation (10/27/2015) for atypical chest pain (mid-upper abdominal pain, associated with beltching, fatigue and nausea). Troponin <0.01 x 2, TSH normal. BUN/Cr 14/0.9, EKG: SB with no acute EKG changes. Lexiscan MPS (10/30/2015) showed no stress induced ischemia, EF 84%, no wall motion abnormalities. No follow-up with cardiology since 2015. [facility] ED 4/29/2020 increased SOB, small PE without cor pulmonale, discharged on Eliquis. 5/20/2020: Repeat CTA chest: No PE.    [facility] ED on 5/26/2020 with complaints gradually worsening ANG with intermittent mid-sternal chest \"heaviness,\" occurring at rest and while talking on the phone for > 20 minutes. Since she was diagnosed with PE (4/29/2020) she has continued taking Eliquis but still \"hasn't felt right. \" Her activity is limited due to ANG. She had called her pulmonologist over the weekend (5/23/2020) and was started on albuterol/prednisone--> with no symptom improvement. Denies recent cough, flu, pneumonia, fever, chills, nausea or vomiting. Lifelong smoker / no home O2. Denies lightheadedness, dizziness, LE swelling, abdominal bloating, orthopnea, PND, palpitations, heart racing. She has from back pain that has not changed and arthritis in her knees. She has chronic allergies and gets allergy shots / scheduled to see her allergist in 6/2020. Her coreg was changed to hydralazine ~ 1 month ago (due to have an interaction with her allergy shots) and remains on Norvasc. Upon arrival to the ED: Blood pressure 177/86, heart rate 95, afebrile, 87% on room air.   Labs: Sodium 137, potassium 3.9, CO2 19, BUN/creatinine 23/1.1, Pleasant affect     DATA:    ECG / Tele strips: SR.   Diagnostic:    Labs:   CBC:   Recent Labs     05/26/20 0116   WBC 16.7*   HGB 14.4   HCT 42.1        BMP:   Recent Labs     05/26/20 0116      K 3.9   CO2 19*   BUN 23   CREATININE 1.1*   LABGLOM 49   CALCIUM 9.9     HgA1c:   Lab Results   Component Value Date    LABA1C 5.7 (H) 09/17/2018     No results found for: EAG  proBNP:   Recent Labs     05/26/20 0116   PROBNP 337     CARDIAC ENZYMES:  Recent Labs     05/26/20 0116   TROPONINI <0.01     FASTING LIPID PANEL:  Lab Results   Component Value Date    CHOL 192 03/11/2020    HDL 49 03/11/2020    LDLCALC 106 03/11/2020    TRIG 183 03/11/2020     LIVER PROFILE:  Recent Labs     05/26/20 0116   AST 19   ALT 15   LABALBU 4.7     CTA pulmonary: 5/26/2020  No acute PE    Chest x-ray: 5/26/2020  No acute cardiopulmonary process. TTE: 4/29/2020   Ejection fraction is visually estimated at 65%. No regional wall motion abnormalities seen. Normal right ventricle structure and function. Physiologic and/or trace mitral regurgitation is present. Assessment/Plan as per Dr. Zen Beck:  1. Exertional Dyspnea -- etiology unclear. Hx of asthma  2. Atypical chest pain -- Troponin <0.01 x 1, No acute EKG changes. Recent PE 4/2020 -- now resolved on repeat CTA 5/26/2020. Nonischemic Lexiscan MPS (2015). 3. Normal LVEF on TTE 4/29/2020 and no VHD. 4. Uncontrolled HTN -- Norvasc discontinued. Started on Cardizem (5/26) and Hydralazine continued. 5. Environmental / Seasonal allergies: Last allergy shot > 1 year ago. Scheduled to follow-up 6/2020 with Dr. Kathy Castle. 6. HLD: on statin therapy   7. +Strong family history significant for premature CAD  8. Lifelong nonsmoker   9.  Hypothyroidism: on replacement therapy      -ECHO images (4/29/2020) reviewed by Dr. Zen Beck. Normal LVEF and RV function.  -Cycle CE  -Lexiscan MPS scheduled in am to rule out ischemic heart disease (unable to exercise due to chronic

## 2020-05-26 NOTE — CONSULTS
Nephrology Consult Note  Patient's Name: Xin Greco  8:05 AM  5/26/2020    Nephrologist: Dr. Yoel Felton    Reason for Consult:  Uncontrolled HTN  Requesting Physician:  Jessa Gary DO    Chief Complaint:  SOB    History Obtained From:  patient and past medical records    History of Present Ilness:  Xin Greco is a 77 y.o. female presenting to the ED for shortness of breath, beginning several days ago. The patient states that over the last several days she has been feeling short of breath, which is worse with ambulation. She states that she talked to her pulmonologist and recently started on albuterol and prednisone. She states despite these treatments she is having no improvement. She states she was started   on Eliquis for PE. Patient states that she does have some intermittent chest pain. Otherwise no other acute symptoms. Patient had presented to the ER on 5/23 with c/o leg numbness, stating it felt like her right leg was falling asleep, but upon examination, it was affecting both legs and she had decreased sensation of her right great toe. Workup revealed slight hypokalemia at 3.3,  for which she was treated with oral potassium. After she was discharged home on 5/23, she called the answering service that evening with c/o sob walking to end of her driveway. She had been diagnosed with PE about 1 month ago and had been placed on Eliquis. She was given a prednisone taper and albuterol inhaler to cover for asthma exacerbation and instructed to see how she did over the next several days and return to er if symptoms worsened. Labs revealed:;  Na 137, K 3.9, Cl 96, CO2 23, BUN 23, Cr 1.1, AG 22, eGFR 49, glucose 120, Ca++ 9.9, Pro-, Trop T <0.01. alb 4.7, alk phos 58, alt 15, ast 19, wbc 16.7, hgb 14.4, hct 42.1, plt 398. Upon examination, pt is resting comfortably in bed.   She reports that her PCP recently took her off the coreg because she needed to be off betablocker in order to

## 2020-05-26 NOTE — ED NOTES
Patient currently off floor     Genesis Tipton, [facility]  05/26/20 3863

## 2020-05-27 NOTE — PROGRESS NOTES
provider] apixaban  5 mg Oral BID    hydrALAZINE  100 mg Oral 3 times per day    pravastatin  20 mg Oral Nightly    levothyroxine  25 mcg Oral Daily    budesonide  0.5 mg Nebulization BID    And    Arformoterol Tartrate  15 mcg Nebulization BID    montelukast  10 mg Oral Nightly    dilTIAZem  240 mg Oral Daily       sodium chloride flush, sodium chloride flush, sodium chloride flush, acetaminophen, albuterol    IMPRESSION/RECOMMENDATIONS:      HTN improving  Hypokalemia states she did not eat much yesterday. Coronary disease work-up in progress. For cardiac cath in a.m.   History of pulmonary embolus  History of asthma  Hypothyroid disease      Riri Tate MD  5/27/2020 2:23 PM

## 2020-05-27 NOTE — PROCEDURES
[employer] Nuclear Stress Test:    Cardiologist: Dr. Natalie Parker    Date: 5/27/2020    Indications for study: Chest pain    1. She experienced chest pain (mid-sternal) following Lexiscan injection  2. No new arrhythmias  3. STT changes at baseline with +ischemic changes following Lexiscan injection  4.  Nuclear images pending    Michelle Orlando MD  [facility]

## 2020-05-27 NOTE — H&P
[address]                  [address] [facility],  [facility]                              HISTORY AND PHYSICAL    PATIENT NAME: Tammy Wick                     :        1945  MED REC NO:   [id number]                            ROOM:       8209  ACCOUNT NO:   [de-identified]                           ADMIT DATE: 2020  PROVIDER:     Tonya Morel DO    CHIEF COMPLAINT/REASON FOR THIS HOSPITAL ADMISSION:  Acute hypoxic  respiratory failure with 80 to 90% pulse oximetry reading with  exertional walking. HISTORY OF PRESENT ILLNESS:  The patient is a 60-year-old female with  intermittent asthma with associated environmental allergies which have  been well controlled. She was admitted to the hospital on 2020  with increasing shortness of breath and found to have a small pulmonary  embolism without cor pulmonale for which she was discharged on Eliquis. She continued to have shortness of breath with exertion which led to two  emergency room visits and several calls to the pulmonary office along  with visits to my office. A repeat CTA of the chest x2 has demonstrated  complete resolution of her PE without additional parenchymal disease. Over the weekend, she apparently called Pulmonary answering service and  was started on prednisone and albuterol; however, there has been no  improvement in her symptoms. She reports dyspnea with activity and  chest pressure which resolves when she rests. Sometimes the dyspnea is  noticeable with longer conversations. There is no cough. No fever,  sweats or chills. No hemoptysis. No wheezing. Her last stress test  was in . She has been a lifelong nonsmoker. Upon arrival to this  hospital facility, her white blood cell count was 16.7, H and H of 14.4  and 42.1 respectively. Platelets were 728,538. Potassium was 3.9. BUN  was 23. Creatinine was 1.1. Her blood pressure was elevated.    Cardiology

## 2020-05-27 NOTE — PROGRESS NOTES
Oral BID Annalee Buccino, DO   5 mg at 05/27/20 0797    albuterol (ACCUNEB) nebulizer solution 0.63 mg  0.63 mg Nebulization Q6H PRN Annalee Buccino, DO        hydrALAZINE (APRESOLINE) tablet 100 mg  100 mg Oral 3 times per day Annalee Buccino, DO   100 mg at 05/27/20 0635    pravastatin (PRAVACHOL) tablet 20 mg  20 mg Oral Nightly Annalee Buccino, DO   20 mg at 05/26/20 2047    levothyroxine (SYNTHROID) tablet 25 mcg  25 mcg Oral Daily Annalee Buccino, DO   25 mcg at 05/27/20 0635    budesonide (PULMICORT) nebulizer suspension 500 mcg  0.5 mg Nebulization BID Annalee Buccino, DO   500 mcg at 05/27/20 0748    And    Arformoterol Tartrate (BROVANA) nebulizer solution 15 mcg  15 mcg Nebulization BID Annalee Buccino, DO   15 mcg at 05/27/20 0747    montelukast (SINGULAIR) tablet 10 mg  10 mg Oral Nightly Annalee Buccino, DO   10 mg at 05/26/20 2047    dilTIAZem (CARDIZEM CD) extended release capsule 240 mg  240 mg Oral Daily Mary Jo Mcknight MD   240 mg at 05/27/20 9267    regadenoson (LEXISCAN) injection 0.4 mg  0.4 mg Intravenous ONCE PRN JEANETTE Hardin - CNP             Physical Exam:  BP (!) 140/65   Pulse 71   Temp 99.2 °F (37.3 °C) (Temporal)   Resp 16   Ht 5' 1\" (1.549 m)   Wt 132 lb (59.9 kg)   SpO2 95%   BMI 24.94 kg/m²   Wt Readings from Last 3 Encounters:   05/26/20 132 lb (59.9 kg)   05/23/20 136 lb (61.7 kg)   05/22/20 136 lb 11.2 oz (62 kg)     Appearance: Awake, alert, no acute respiratory distress  Skin: Intact, no rash  Head: Normocephalic, atraumatic  Eyes: EOMI, no conjunctival erythema  ENMT: No pharyngeal erythema, MMM, no rhinorrhea  Neck: Supple, no elevated JVP, no carotid bruits  Lungs: Clear to auscultation bilaterally. No wheezes, rales, or rhonchi.   Cardiac: Regular rate and rhythm, +S1S2, no murmurs apparent  Abdomen: Soft, nontender, +bowel sounds  Extremities: Moves all extremities x 4, no lower extremity edema  Neurologic: No focal motor deficits apparent, normal mood and structure and function.   Physiologic and/or trace mitral regurgitation is present.     Assessment/Plan:  1. Dyspnea on exertion / history of asthma, allergies, recent PE, r/o cardiac etiology  2. Atypical chest pain -- Troponin <0.01 x 2, No new acute EKG changes. Recent PE 4/2020 -- now resolved on repeat CTA 5/26/2020. Nonischemic Lexiscan MPS (2015). 3. Normal LVEF on TTE 4/29/2020 and no significant VHD. 4. Uncontrolled HTN -- Norvasc discontinued. Started on Cardizem (5/26) and Hydralazine continued. Renal following. 5. Environmental / Seasonal allergies: Last allergy shot > 1 year ago. Scheduled to follow-up 6/2020 with Dr. Breanna Raman. 6. HLD: on statin therapy   7. +Strong family history significant for premature CAD  8. Lifelong nonsmoker   9. Hypothyroidism: on replacement therapy  10. Hypokalemia -- K 2.9    - Will replace K  - Recent echocardiogram images reviewed (normal ventricular function)  - Lexiscan nuclear stress test today (patient states that she is unable to complete exercise stress test d/t chronic back pain and LE arthritis  - Care per pulmonary  - Continue current medications     Thank you for allowing me to participate in your patient's care.  Please feel free to contact me if you have any questions or concerns.     Raulito Wells MD  [facility]) Cardiology

## 2020-05-28 NOTE — PROGRESS NOTES
03/11/2020    TRIG 149 03/25/2019    TRIG 142 09/17/2018     Lab Results   Component Value Date    HDL 49 03/11/2020    HDL 46 03/25/2019    HDL 45 09/17/2018     Lab Results   Component Value Date    LDLCALC 106 (H) 03/11/2020    LDLCALC 93 03/25/2019    LDLCALC 91 09/17/2018     Lab Results   Component Value Date    LABVLDL 37 03/11/2020    LABVLDL 30 03/25/2019    LABVLDL 28 09/17/2018     No results found for: CHOLHDLRATIO  Recent Labs     05/26/20  0116   PROBNP 337       Cardiac Tests:  ECG: SR, NSSTT changes     Telemetry: SR/ST    CTA pulmonary: 5/26/2020  No acute PE     Chest x-ray: 5/26/2020  No acute cardiopulmonary process.     TTE: 4/29/2020   Ejection fraction is visually estimated at 65%.   No regional wall motion abnormalities seen.   Normal right ventricle structure and function.   Physiologic and/or trace mitral regurgitation is present.       Lexiscan Nuclear Stress Test:  Cardiologist: Dr. Robbin Parker  Date: 5/27/2020  Indications for study: Chest pain  1. She experienced chest pain (mid-sternal) following Lexiscan injection  2. No new arrhythmias  3. STT changes at baseline with +ischemic changes following Lexiscan injection  1. No reversible perfusion defect   2. Ejection fraction is 75 %. 3. No significant wall motion abnormality         Assessment/Plan:  1. Dyspnea on exertion / history of asthma, allergies, recent PE, r/o cardiac etiology  2. Atypical chest pain -- Troponin <0.01 x 2, No new acute EKG changes. Recent PE 4/2020 -- now resolved on repeat CTA 5/26/2020. Nonischemic Lexiscan MPS (2015). +ischemic STT changes and chest pain during Lexiscan nuclear stress test on 5/27/2020.  3. Normal LVEF on TTE 4/29/2020 and no significant VHD. 4. Uncontrolled HTN -- Norvasc discontinued. Started on Cardizem (5/26) and Hydralazine continued. Renal following. 5. Environmental / Seasonal allergies: Last allergy shot > 1 year ago. Scheduled to follow-up 6/2020 with Dr. Antonio Dupree.    6. HLD:

## 2020-05-28 NOTE — PROGRESS NOTES
Department of Internal Medicine  Nephrology Attending Progress Note        SUBJECTIVE:  Mrs Eda Rose back from her heart catheterization, 2 vessels found to have some blockages, she is being seen by cardiothoracic surgery for evaluation regarding surgical versus PTCA of these lesions. Continues to complain of shortness of breath, I did discuss with her the high likelihood of being changed to metoprolol in view of her coronary disease and that they will probably would need to be some discussion between cardiology and Dr. Flaca Conte who had requested that she be discontinued from her beta-blockers prior to any allergy shots.     Physical Exam:    Vitals:    05/28/20 1524   BP: 133/60   Pulse: 66   Resp: 18   Temp: 98 °F (36.7 °C)   SpO2: 95%       I/O last 24 hours:  Intake/Output inaccurate    Weight: Not done    General Appearance:  awake, alert, oriented, in no acute distress  Skin: no rash, turgor wnl  Heent:  eomi, mmm  Neck: no bruits or jvd noted  Cardiovascular:  S1, S2 without m/r/g  Respiratory: CTA B without w/r/r  Abdomen:  +bs, soft, nt, nd  Ext: negative lower extremity edema    DATA:    CBC with Differential:    Lab Results   Component Value Date    WBC 16.7 05/26/2020    RBC 4.55 05/26/2020    HGB 14.4 05/26/2020    HCT 42.1 05/26/2020     05/26/2020    MCV 92.5 05/26/2020    MCH 31.6 05/26/2020    MCHC 34.2 05/26/2020    RDW 13.0 05/26/2020    SEGSPCT 52 02/23/2011    LYMPHOPCT 12.2 05/26/2020    MONOPCT 6.1 05/26/2020    BASOPCT 0.2 05/26/2020    MONOSABS 1.01 05/26/2020    LYMPHSABS 2.03 05/26/2020    EOSABS 0.02 05/26/2020    BASOSABS 0.03 05/26/2020     BMP:    Lab Results   Component Value Date     05/28/2020    K 3.7 05/28/2020    K 3.9 05/26/2020     05/28/2020    CO2 23 05/28/2020    BUN 11 05/28/2020    LABALBU 4.7 05/26/2020    CREATININE 0.9 05/28/2020    CALCIUM 9.1 05/28/2020    GFRAA >60 05/28/2020    LABGLOM >60 05/28/2020    GLUCOSE 122 05/28/2020    GLUCOSE 118 02/23/2011 R   [START ON 5/29/2020] aspirin  81 mg Oral Daily    pantoprazole  40 mg Oral QAM AC    sodium chloride flush  10 mL Intravenous 2 times per day    [Held by provider] apixaban  5 mg Oral BID    hydrALAZINE  100 mg Oral 3 times per day    pravastatin  20 mg Oral Nightly    levothyroxine  25 mcg Oral Daily    budesonide  0.5 mg Nebulization BID    And    Arformoterol Tartrate  15 mcg Nebulization BID    montelukast  10 mg Oral Nightly    dilTIAZem  240 mg Oral Daily      sodium chloride 20 mL/hr at 05/28/20 3057    sodium chloride 20 mL/hr at 05/28/20 0934    sodium chloride       sodium chloride flush, magnesium hydroxide, sodium chloride flush, acetaminophen, albuterol    IMPRESSION/RECOMMENDATIONS:         HTN improving  Hypokalemia better after supplementation  Coronary disease work-up in progress. s/p cardiac cath  For surgical evaluation.   History of pulmonary embolus  History of asthma  Hypothyroid disease      Oleta Lanes, MD  5/28/2020 3:34 PM

## 2020-05-28 NOTE — PROCEDURES
[address]                  [address] [place],  [facility]                            CARDIAC CATHETERIZATION    PATIENT NAME: Bethany Oropeza                     :        1945  MED REC NO:   [id number]                            ROOM:       8209  ACCOUNT NO:   [de-identified]                           ADMIT DATE: 2020  PROVIDER:     Celine Prajapati MD    DATE OF PROCEDURE:  2020    PROCEDURES:  1. Coronary angiography. 2.  Conscious sedation using Versed and fentanyl. Indication #4, score of 7. INDICATION FOR PROCEDURE:  The patient is a 76-year-old female who  presented to the hospital with a chest pain. She was found to have  abnormal stress test.  The patient was brought to the cath lab to  evaluate the anatomy of her coronary arteries with the intention to  intervene as warranted. DESCRIPTION OF PROCEDURE:  After the appropriate informed consent, the  right wrist area was prepped and draped in the usual sterile fashion. A  timeout was called. Ranjit test was normal.  The right wrist area was  locally anesthetized with 2 mL of 2% lidocaine. A 21-gauge needle was  used to access the right common femoral artery. A 6-Afghan introducer  sheath was used to cannulate the right radial artery. The radial artery  was relatively small vessel; however, we were able to advance a  Glidewire, then a 6-Afghan JL4 catheter for the left coronary  angiography in multiple projections. However, when trying to pull out  the sheath, there was spasm that we were able to pull the catheter  slowly with mild discomfort. Then, we used 4-Afghan JL4 catheter to  engage the right coronary artery unsuccessfully. Then, we used [address] [place] [address]  4-Afghan to engage the vessel. ANGIOGRAPHIC FINDINGS:  1. The left main coronary artery arises normally from the left sinus of  Valsalva.   It is a good size vessel that bifurcates into left anterior  descending artery and left circumflex artery. 2.  The left anterior descending artery is a mid size vessel, extends  down to the apex. It gives off a mid size diagonal branch. The LAD has  80% stenosis just distal to the first diagonal and the first diagonal  branch has 80% disease in the mid segment. 3.  The left circumflex artery is a large vessel that is tortuous  without any disease. 4.  The ramus intermedius artery also is a large vessel that is  subdivided distally and it is tortuous without any disease. There was  grade 3 left-to-right collaterals to three branches where two of them  are large and one of them was small one.  5.  The right coronary artery has anterior takeoff. It is a mid size  vessel that has total occlusion in the mid segment. At the end of the procedure, the catheter and the wire were pulled out  from the body, the sheath was pulled out from the body, and a Vasc Band  was applied to the right wrist area with effective hemostasis. COMPLICATIONS:  None. ESTIMATED TOTAL BLOOD LOSS:  10 to 15 mL. MEDICATIONS USED DURING THE PROCEDURE:  We used intraarterial verapamil  to prevent vasospasm. We used intraarterial nitroglycerin to treat  vasospasm. We used intraarterial heparin for anticoagulation. CONSCIOUS SEDATION:  We used Versed and fentanyl for conscious sedation. First dose was given at 1330, procedure ended at 1405. There was a 35  minutes of direct face-to-face supervision during conscious sedation  administration. TOTAL CONTRAST USED:  100 mL. TOTAL FLUOROSCOPY TIME:  7.5 minutes. IMPRESSION:  1. Significant disease involving mid LAD. 2.  Significant disease involving mid size first diagonal branch. 3.  Totally occluded RCA with grade 3 left-to-right collaterals. RECOMMENDATIONS:  1.  CT Surgery evaluation for possible CABG. 2.  Continue current treatment.   3.  Continue to hold Eliquis just in case the patient will be brought  back for PCI to LAD and diagonal branch

## 2020-05-28 NOTE — PROGRESS NOTES
22 05/27/2020    BUN 16 05/27/2020    LABALBU 4.7 05/26/2020    CREATININE 1.0 05/27/2020    CALCIUM 9.2 05/27/2020    GFRAA >60 05/27/2020    LABGLOM 54 05/27/2020     PT/INR:  No results found for: PROTIME, INR  Last 3 Troponin:    Lab Results   Component Value Date    TROPONINI <0.01 05/27/2020    TROPONINI <0.01 05/26/2020    TROPONINI <0.01 05/23/2020     TSH:    Lab Results   Component Value Date    TSH 3.250 03/11/2020      Assessment:     1. Acute hypoxemia with exertion  2. Abnormal lexiscan stress test with mid sternal CP following lexiscan injection with STT ischemic changes  3.  HTN    Plan:       Continue same plan and orders    For heart cath today

## 2020-05-29 NOTE — PROGRESS NOTES
injection 10 mL  10 mL Intravenous PRN Uriah Ba MD        magnesium hydroxide (MILK OF MAGNESIA) 400 MG/5ML suspension 30 mL  30 mL Oral Daily PRN Uriah Ba MD   30 mL at 05/29/20 0946    aspirin EC tablet 81 mg  81 mg Oral Daily Uriah Ba MD   81 mg at 05/29/20 0908    pantoprazole (PROTONIX) tablet 40 mg  40 mg Oral QAM AC Uriah Ba MD   40 mg at 05/29/20 4811    sodium chloride flush 0.9 % injection 10 mL  10 mL Intravenous PRN Uriah Ba MD   10 mL at 05/26/20 0247    sodium chloride flush 0.9 % injection 10 mL  10 mL Intravenous 2 times per day Uriah aB MD   10 mL at 05/29/20 0908    acetaminophen (TYLENOL) tablet 650 mg  650 mg Oral Q4H PRN Uriah Ba MD   650 mg at 05/28/20 2314    [Held by provider] apixaban (ELIQUIS) tablet 5 mg  5 mg Oral BID Uriah Ba MD   5 mg at 05/27/20 0812    albuterol (ACCUNEB) nebulizer solution 0.63 mg  0.63 mg Nebulization Q6H PRN Uriah Ba MD        hydrALAZINE (APRESOLINE) tablet 100 mg  100 mg Oral 3 times per day Uriah Ba MD   100 mg at 05/29/20 0611    pravastatin (PRAVACHOL) tablet 20 mg  20 mg Oral Nightly Uriah Ba MD   20 mg at 05/29/20 0016    levothyroxine (SYNTHROID) tablet 25 mcg  25 mcg Oral Daily Uriah Ba MD   25 mcg at 05/29/20 0611    budesonide (PULMICORT) nebulizer suspension 500 mcg  0.5 mg Nebulization BID Uriah Ba MD   500 mcg at 05/29/20 6549    And    Arformoterol Tartrate (BROVANA) nebulizer solution 15 mcg  15 mcg Nebulization BID Uriah Ba MD   15 mcg at 05/29/20 0919    montelukast (SINGULAIR) tablet 10 mg  10 mg Oral Nightly Uriah Ba MD   10 mg at 05/29/20 0016    dilTIAZem (CARDIZEM CD) extended release capsule 240 mg  240 mg Oral Daily Uriah Ba MD   240 mg at 05/29/20 0908      sodium chloride 20 mL/hr at 05/28/20 0926    sodium chloride 20 mL/hr at 05/28/20 0917       Physical Exam:  /73   Pulse 82   Temp 98.6 °F (37 °C) (Temporal)   Resp 16   Ht 5' 1\"

## 2020-05-29 NOTE — PROGRESS NOTES
LYMPHSABS 2.03 05/26/2020    EOSABS 0.02 05/26/2020    BASOSABS 0.03 05/26/2020     BMP:    Lab Results   Component Value Date     05/29/2020    K 3.9 05/29/2020    K 3.9 05/26/2020     05/29/2020    CO2 24 05/29/2020    BUN 11 05/29/2020    LABALBU 4.7 05/26/2020    CREATININE 0.9 05/29/2020    CALCIUM 8.7 05/29/2020    GFRAA >60 05/29/2020    LABGLOM >60 05/29/2020     PT/INR:  No results found for: PROTIME, INR  Last 3 Troponin:    Lab Results   Component Value Date    TROPONINI <0.01 05/27/2020    TROPONINI <0.01 05/26/2020    TROPONINI <0.01 05/23/2020     TSH:    Lab Results   Component Value Date    TSH 3.250 03/11/2020      Assessment:     1. S/P heart cath with findings of significant CAD involving the mid LAD, first diagonal branch and totally occluded RCA with grade 3 left to right collaterals  2. HTN  3.  HLD    Plan:       Continue same plan and orders    CT surgery to evaluate for possible CABG

## 2020-05-29 NOTE — PROGRESS NOTES
Messaged DR. Morel via perfect serve. No answer and was directed to a voice recording but not able to left a VM. Will try to call again later.        Mick Dumont

## 2020-05-29 NOTE — PROGRESS NOTES
[place], [place]) informed of PFT's.  Patient NOT to be discharge and plan for Surgery June 2nd, 2020

## 2020-05-29 NOTE — PROGRESS NOTES
Message sent via [employer] regarding,   \"Patient completed ambulatory pulse Ox- 70ft. 96% RA. Patient EXTREMELY anxious. Bedside RN unable to reach Dr. Toshia Leonard since this morning for anxiety medications. Also, Respiratory called and UNABLE to do PFT's today unless patient IS for sure going to surgery on Monday\"    Per CTS-\"  5/29/20 12:55 PM   They need to do PFTs today   5/29/20 12:55 PM   If she cannot go home we would do her Monday     Message left with PFT's regarding above and requesting that patient needs PFT's completed today. Requesting that department calls Charge RN back. Awaiting call back. Update:Spoke to Ana Hein and will complete patient's PFT study today.

## 2020-05-30 NOTE — PROGRESS NOTES
Component Value Date     05/29/2020    K 3.9 05/29/2020    K 3.9 05/26/2020     05/29/2020    CO2 24 05/29/2020    BUN 11 05/29/2020    CREATININE 0.9 05/29/2020    LABALBU 4.7 05/26/2020    CALCIUM 8.7 05/29/2020    GFRAA >60 05/29/2020    LABGLOM >60 05/29/2020       Assessment:  1. Dyspnea  2. Multivessel CAD with plan for CABG  3. Recent pulmonary embolism, resolved on imaging  4. Asthma, no exacerbation    Plan:  Would resume her anticoagulation at this time for recent PE's, especially hospitalized and hypercoagulable  - start lovenox 1mg/kg BID which will be easier to manage with her upcoming surgery. Monitor for any worsening of the right hand. US of the RUE without pseudoaneurysm.      Electronically signed by Ana Isidro MD on 5/30/2020 at 12:51 PM

## 2020-05-30 NOTE — PROGRESS NOTES
Admit Date: 5/26/2020      Subjective:     Patient[de-identified] no acute issues reported overnight  Medication side effects: none    Scheduled Meds:   metoprolol tartrate  25 mg Oral BID    atorvastatin  40 mg Oral Nightly    guaiFENesin  400 mg Oral TID    fluticasone  2 spray Each Nostril Daily    aspirin  81 mg Oral Daily    pantoprazole  40 mg Oral QAM AC    sodium chloride flush  10 mL Intravenous 2 times per day    [Held by provider] apixaban  5 mg Oral BID    hydrALAZINE  100 mg Oral 3 times per day    levothyroxine  25 mcg Oral Daily    budesonide  0.5 mg Nebulization BID    And    Arformoterol Tartrate  15 mcg Nebulization BID    montelukast  10 mg Oral Nightly     Continuous Infusions:   sodium chloride 20 mL/hr at 05/28/20 0926    sodium chloride 20 mL/hr at 05/28/20 0917     PRN Meds:clonazePAM, sodium chloride flush, magnesium hydroxide, sodium chloride flush, acetaminophen, albuterol    Objective:   I/O last 3 completed shifts: In: 250 [P.O.:240; I.V.:10]  Out: 1400 [Urine:1400]  No intake/output data recorded.     /61   Pulse 59   Temp 97.4 °F (36.3 °C)   Resp 16   Ht 5' 1\" (1.549 m)   Wt 131 lb 3.2 oz (59.5 kg)   SpO2 98%   BMI 24.79 kg/m²   General appearance: alert, appears stated age and cooperative  Skin:negative  Heent:negative  Lungs: clear to auscultation bilaterally  Heart: regular rate and rhythm, S1, S2 normal, no murmur, click, rub or gallop  Abdomen: soft, non-tender; bowel sounds normal; no masses,  no organomegaly  Extremities:no edema  Neurologic: Grossly normal    Labs:  CBC with Differential:    Lab Results   Component Value Date    WBC 7.6 05/29/2020    RBC 3.77 05/29/2020    HGB 11.6 05/29/2020    HCT 36.5 05/29/2020     05/29/2020    MCV 96.8 05/29/2020    MCH 30.8 05/29/2020    MCHC 31.8 05/29/2020    RDW 13.3 05/29/2020    SEGSPCT 52 02/23/2011    LYMPHOPCT 12.2 05/26/2020    MONOPCT 6.1 05/26/2020    BASOPCT 0.2 05/26/2020    MONOSABS 1.01 05/26/2020

## 2020-05-31 NOTE — PROGRESS NOTES
CC: CP/SOB    Brief HPI:  Patient seen and discussed with Dr. Swathi Lira. Awake, alert. No complaints. denies any CP or SOB currently. Resting in bed. Anxious regarding news of carotid disease. Reports family history of stroke with sister.        Past Medical History:   Diagnosis Date    Acid reflux     Asthma     Chronic back pain     Hyperlipidemia     Hypertension     Thyroid disease      Past Surgical History:   Procedure Laterality Date    BACK SURGERY  06/2015    Dr Karo Dutton at [address] [place] [address]  08/2018    HERNIA REPAIR      INTRACAPSULAR CATARACT EXTRACTION Bilateral 2016, 6/2018    KNEE SURGERY      TONSILLECTOMY       Social History     Socioeconomic History    Marital status:      Spouse name: Not on file    Number of children: Not on file    Years of education: Not on file    Highest education level: Not on file   Occupational History    Occupation: teacher- retired      Comment: retired   Social Needs    Financial resource strain: Not on file    Food insecurity     Worry: Not on file     Inability: Not on file   [employer] needs     Medical: Not on file     Non-medical: Not on file   Tobacco Use    Smoking status: Never Smoker    Smokeless tobacco: Never Used   Substance and Sexual Activity    Alcohol use: No     Alcohol/week: 0.0 standard drinks    Drug use: No    Sexual activity: Not Currently   Lifestyle    Physical activity     Days per week: Not on file     Minutes per session: Not on file    Stress: Not on file   Relationships    Social connections     Talks on phone: Not on file     Gets together: Not on file     Attends Pent[employer]ism service: Not on file     Active member of club or organization: Not on file     Attends meetings of clubs or organizations: Not on file     Relationship status: Not on file    Intimate partner violence     Fear of current or ex partner: Not on file     Emotionally abused: Not on

## 2020-05-31 NOTE — PROGRESS NOTES
LIPIDPAN      IMPRESSION/RECOMMENDATIONS:     1. arf  Cr 1.1 on admission  Creatinine at 0.9 at last check 5/29, will check in the am  No evidence of dye induced  atn after heart cath  Continue to monitor labs    2. Cad  Sp heart cath  Tentatively scheduled for CABG 6/2 with Dr. Kait Ng    3. h/o PE 4/2020  On oac  pulm seeing ofr sob  Aerosols per pulm  pe not seen on cta now    4. htn  Improved on hydralazine and cardizem  Off norvasc  Low salt diet    5. hypothyroidism  On synthroid    6. Carotid stenosis-  Vascular consulted with left >90% stenosed   Right 70-89% stenosed. Plans for further work up post CABG      JEANETTE Reilly - CNP     Patient seen and examined all key components of the physical performed independently , case discussed with NP, all pertinent labs and radiologic tests personally reviewed agree with above.       Adryan Vazquez MD

## 2020-05-31 NOTE — PROGRESS NOTES
Admit Date: 5/26/2020      Subjective:     Patient: no acute issues reported overnight  Medication side effects: none    Scheduled Meds:   enoxaparin  1 mg/kg Subcutaneous Q12H    metoprolol tartrate  25 mg Oral BID    atorvastatin  40 mg Oral Nightly    guaiFENesin  400 mg Oral TID    fluticasone  2 spray Each Nostril Daily    aspirin  81 mg Oral Daily    pantoprazole  40 mg Oral QAM AC    sodium chloride flush  10 mL Intravenous 2 times per day    hydrALAZINE  100 mg Oral 3 times per day    levothyroxine  25 mcg Oral Daily    budesonide  0.5 mg Nebulization BID    And    Arformoterol Tartrate  15 mcg Nebulization BID    montelukast  10 mg Oral Nightly     Continuous Infusions:   sodium chloride 20 mL/hr at 05/28/20 0926    sodium chloride 20 mL/hr at 05/28/20 0917     PRN Meds:clonazePAM, sodium chloride flush, magnesium hydroxide, sodium chloride flush, acetaminophen, albuterol    Objective:   I/O last 3 completed shifts: In: 1200 [P.O.:1200]  Out: 1500 [Urine:1500]  No intake/output data recorded.     /63   Pulse 68   Temp 97.9 °F (36.6 °C) (Oral)   Resp 16   Ht 5' 1\" (1.549 m)   Wt 131 lb 3.2 oz (59.5 kg)   SpO2 96%   BMI 24.79 kg/m²   General appearance: alert, appears stated age and cooperative  Skin: negative  Heent:negative  Lungs: clear to auscultation bilaterally  Heart: regular rate and rhythm, S1, S2 normal, no murmur, click, rub or gallop  Abdomen: soft, non-tender; bowel sounds normal; no masses,  no organomegaly  Extremities:nio edema  Neurologic: Grossly normal    Labs:  CBC with Differential:    Lab Results   Component Value Date    WBC 7.6 05/29/2020    RBC 3.77 05/29/2020    HGB 11.6 05/29/2020    HCT 36.5 05/29/2020     05/29/2020    MCV 96.8 05/29/2020    MCH 30.8 05/29/2020    MCHC 31.8 05/29/2020    RDW 13.3 05/29/2020    SEGSPCT 52 02/23/2011    LYMPHOPCT 12.2 05/26/2020    MONOPCT 6.1 05/26/2020    BASOPCT 0.2 05/26/2020    MONOSABS 1.01 05/26/2020

## 2020-05-31 NOTE — PROGRESS NOTES
INPATIENT CARDIOLOGY FOLLOW-UP    Name: Avril Jensen    Age: 77 y.o. Date of Admission: 5/26/2020  1:02 AM    Date of Service: 5/31/2020    Chief Complaint: Follow-up for dyspnea on exertion, multivessel CAD    Interim History:  +ANG ever since she was diagnosed with PE (no PE on most recent CTA chest). Multivessel CAD on 5/28/2020 cardiac catheterization (CTS planning on CABG). +SOB with mild levels of exertion overnight, +chest pain overnight. Currently with no chest pain, respiratory distress, or palpitations at rest. SR/ST on EKG and telemetry. Review of Systems:   Cardiac: As per HPI  General: No fever, chills  Pulmonary: As per HPI  HEENT: No visual disturbances, difficult swallowing  GI: No nausea, vomiting  Musculoskeletal: CAMP x 4, no focal motor deficits  Skin: Intact, no rashes  Neuro/Psych: No headache or seizures    Problem List:  Patient Active Problem List   Diagnosis    Acid reflux    Mild intermittent asthma without complication    Seasonal allergic rhinitis due to pollen    Essential hypertension    Mixed hyperlipidemia    Acquired hypothyroidism    Discogenic thoracic pain    Chronic sinusitis    Chest pain    Chest pain, unspecified    Acute pulmonary embolism without acute cor pulmonale (HCC)    Hypoxia    Dyspnea    Nonrheumatic tricuspid valve regurgitation    Hypokalemia       Allergies:   Allergies   Allergen Reactions    Medrol [Methylprednisolone] Itching     Severe Itching all over     Biaxin [Clarithromycin] Nausea Only    Pcn [Penicillins]      UNKNOWN REACTION AS A CHILD       Current Medications:  Current Facility-Administered Medications   Medication Dose Route Frequency Provider Last Rate Last Dose    enoxaparin (LOVENOX) injection 60 mg  1 mg/kg Subcutaneous Q12H Luc Shaffer MD   60 mg at 05/31/20 0925    metoprolol tartrate (LOPRESSOR) tablet 25 mg  25 mg Oral BID Mookie Alvarado MD   25 mg at 05/31/20 0925    atorvastatin (LIPITOR) tablet 40

## 2020-05-31 NOTE — PROGRESS NOTES
13.3 05/29/2020     05/29/2020    MPV 11.0 05/29/2020     Lab Results   Component Value Date     05/29/2020    K 3.9 05/29/2020    K 3.9 05/26/2020     05/29/2020    CO2 24 05/29/2020    BUN 11 05/29/2020    CREATININE 0.9 05/29/2020    LABALBU 4.7 05/26/2020    CALCIUM 8.7 05/29/2020    GFRAA >60 05/29/2020    LABGLOM >60 05/29/2020       Assessment:  1. Dyspnea  2. Multivessel CAD with plan for CABG  3. Recent pulmonary embolism, resolved on imaging  4. Asthma, no exacerbation    Plan:  1. Continue lovenox 1mg/kg BID as this can be easily held prior to surgery  2. R hand improving - cont ice/elevation  3.  Scheduled bronchodilators     Electronically signed by Yuliana Bhatia MD on 5/31/2020 at 12:44 PM

## 2020-05-31 NOTE — CONSULTS
and lower extremities. No joint tenderness or erythema. Normal muscular development. NEURO: Cranial nerves II through XII grossly intact. Strength and sensation are symmetric and intact throughout. Psychiatric: Mental examination revealed the patient was oriented to person, place, and time. The patient was able to demonstrate adequate judgment and reason, without any hallucinations abnormal affect or abnormal behavior on today's exam.      LABS:    Lab Results   Component Value Date    WBC 7.6 05/29/2020    HGB 11.6 05/29/2020    HCT 36.5 05/29/2020     05/29/2020    K 3.9 05/29/2020    BUN 11 05/29/2020    CREATININE 0.9 05/29/2020       RADIOLOGY:  US DUP UPPER EXTREMITY RIGHT ARTERIES   Final Result      No evidence for pseudoaneurysm or stenosis on ultrasound of the   arterial structures of the right upper extremity. US CAROTID ARTERY BILATERAL   Final Result      Based on peak systolic ratios, stenosis of 50-69% appears present   within the internal carotid arteries. On the basis of peak systolic velocities, the left internal carotid   artery would be greater than 90% blocked and the right internal   carotid artery would be 70-89% blocked. No evidence for subclavian steal.      US DUP LOWER EXTREMITY MAPPING BILAT VENOUS   Final Result      NM Cardiac Stress Test Nuclear Imaging   Final Result   1. No reversible perfusion defect   2. Ejection fraction is 75 %. 3. No significant wall motion abnormality         CTA PULMONARY W CONTRAST   Final Result   No acute pulmonary emboli. This report has been electronically signed by Anirudh Moulton MD.      XR CHEST PORTABLE   Final Result   No acute cardiopulmonary process.       VL RHIANNA BILATERAL LIMITED 1-2 LEVELS    (Results Pending)       IMPRESSION:   Active Hospital Problems    Diagnosis    Dyspnea [R06.00]    Nonrheumatic tricuspid valve regurgitation [I36.1]    Hypokalemia [E87.6]    Hypoxia [R09.02]       PLAN: #1 bilateral carotid artery disease. Left greater than right. I personally reviewed the ultrasound findings. At this point she is asymptomatic from her carotid disease and symptomatic from a cardiac standpoint. Continue with scheduled cardiac intervention. We will work her up for carotid disease and outpatient. Ideally I like to get a CT angiogram to further delineate the carotid disease as well as the anatomy and the bifurcation. I discussed with her the natural history of carotid disease and the treatment of such conditions. I recommend antiplatelet therapy statin therapy and risk reduction therapy.     Currently on aspirin 81 mg and Lipitor 40 mg daily      Electronically signed by Ann-Marie Barreto MD on 5/31/2020 at 8:58 AM

## 2020-06-01 NOTE — PROGRESS NOTES
05/26/2020    BASOSABS 0.03 05/26/2020     BMP:    Lab Results   Component Value Date     05/29/2020    K 3.9 05/29/2020    K 3.9 05/26/2020     05/29/2020    CO2 24 05/29/2020    BUN 11 05/29/2020    LABALBU 4.7 05/26/2020    CREATININE 0.9 05/29/2020    CALCIUM 8.7 05/29/2020    GFRAA >60 05/29/2020    LABGLOM >60 05/29/2020     PT/INR:  No results found for: PROTIME, INR  Last 3 Troponin:    Lab Results   Component Value Date    TROPONINI <0.01 05/27/2020    TROPONINI <0.01 05/26/2020    TROPONINI <0.01 05/23/2020     TSH:    Lab Results   Component Value Date    TSH 3.250 03/11/2020      Assessment:     1. ANG with + ischemic cardiac workup revealing multivessel CAD per heart cath  2. Recent PE now resolved  3. Bilateral carotid atherosclerosis per carotid US, left greater than right  4. HTN  5. HLD  6.  H/O asthma    Plan:       Continue same plan and orders    CABG planned for tomorrow  Vascular surgery to address her carotid disease following CABG

## 2020-06-01 NOTE — ANESTHESIA PRE PROCEDURE
History:        Diagnosis Date    Acid reflux     Asthma     Chronic back pain     Hyperlipidemia     Hypertension     Thyroid disease        Past Surgical History:        Procedure Laterality Date    BACK SURGERY  06/2015    Dr Juan A Farah at [address] [place] [address] ([facility])  08/2018    HERNIA REPAIR      INTRACAPSULAR CATARACT EXTRACTION Bilateral 2016, 6/2018    KNEE SURGERY      TONSILLECTOMY         Social History:    Social History     Tobacco Use    Smoking status: Never Smoker    Smokeless tobacco: Never Used   Substance Use Topics    Alcohol use: No     Alcohol/week: 0.0 standard drinks                                Counseling given: Not Answered      Vital Signs (Current):   Vitals:    06/01/20 2215 06/01/20 2300 06/01/20 2310 06/02/20 0535   BP:  (!) 131/56  138/82   Pulse:  66 61 69   Resp: 18 18  18   Temp:  97.8 °F (36.6 °C)  97.6 °F (36.4 °C)   TempSrc:  Temporal  Temporal   SpO2: 97% 95%  96%   Weight:    128 lb 12.8 oz (58.4 kg)   Height:                                                  BP Readings from Last 3 Encounters:   06/02/20 138/82   05/23/20 (!) 153/82   05/22/20 (!) 142/80       NPO Status: Time of last liquid consumption: 2300                        Time of last solid consumption: 2300                        Date of last liquid consumption: 06/01/20                        Date of last solid food consumption: 06/01/20    BMI:   Wt Readings from Last 3 Encounters:   06/02/20 128 lb 12.8 oz (58.4 kg)   05/23/20 136 lb (61.7 kg)   05/22/20 136 lb 11.2 oz (62 kg)     Body mass index is 24.34 kg/m².     CBC:   Lab Results   Component Value Date    WBC 8.9 06/01/2020    RBC 3.96 06/01/2020    HGB 12.5 06/01/2020    HCT 38.1 06/01/2020    MCV 96.2 06/01/2020    RDW 12.9 06/01/2020     06/01/2020       CMP:   Lab Results   Component Value Date     06/01/2020    K 3.8 06/01/2020    K 3.9 05/26/2020     06/01/2020    CO2 24 06/01/2020    BUN 15 06/01/2020    CREATININE 0.9 06/01/2020    GFRAA >60 06/01/2020    LABGLOM >60 06/01/2020    GLUCOSE 94 06/01/2020    GLUCOSE 118 02/23/2011    PROT 7.5 05/26/2020    CALCIUM 9.2 06/01/2020    BILITOT 0.6 05/26/2020    ALKPHOS 58 05/26/2020    AST 19 05/26/2020    ALT 15 05/26/2020       POC Tests: No results for input(s): POCGLU, POCNA, POCK, POCCL, POCBUN, POCHEMO, POCHCT in the last 72 hours. Coags:   Lab Results   Component Value Date    PROTIME 11.7 06/01/2020    INR 1.0 06/01/2020       HCG (If Applicable): No results found for: PREGTESTUR, PREGSERUM, HCG, HCGQUANT     ABGs: No results found for: PHART, PO2ART, LKF4WUE, VKM6OVZ, BEART, C6CTMBPP     Type & Screen (If Applicable):  No results found for: LABABO, LABRH    Drug/Infectious Status (If Applicable):  No results found for: HIV, HEPCAB    COVID-19 Screening (If Applicable):   Lab Results   Component Value Date    COVID19 Not Detected 05/20/2020         EKG 5/26/2020:  Ventricular Rate 84  BPM Final 05/26/2020  1:07 AM [facility]   Atrial Rate 84  BPM Final 05/26/2020  1:07 AM [facility]   P-R Interval 150  ms Final 05/26/2020  1:07 AM [facility]   QRS Duration 88  ms Final 05/26/2020  1:07 AM [facility]   Q-T Interval 382  ms Final 05/26/2020  1:07 AM [facility]   QTc Calculation (Bazett) 451  ms Final 05/26/2020  1:07 AM [facility]   P [place] 65  degrees Final 05/26/2020  1:07 AM [facility]   R [place] 10  degrees Final 05/26/2020  1:07 AM [facility]   T [place] 23  degrees Final 05/26/2020  1:07 AM [facility]   Testing Performed By     Lab - Abbreviation Name Director Address Valid Date Range   360-[facility] MUSE Unknown Unknown 04/18/16 0721-Present   Narrative & Impression     Normal sinus rhythm  Nonspecific ST and T wave abnormality       Echocardiogram 4/29/2020:   Findings    Left Ventricle   Ejection fraction is visually estimated at 65%. No regional wall motion   abnormalities seen. Normal left ventricular wall thickness.  Left ventricle size is normal. Normal left ventricular diastolic filling pattern for age. Right Ventricle   Normal right ventricle structure and function. Left Atrium   Left atrial volume index of 22 ml per meters squared BSA. Right Atrium   Normal right atrium. Mitral Valve   Structurally normal mitral valve. No evidence of mitral valve stenosis. Physiologic and/or trace mitral regurgitation is present. Tricuspid Valve   The tricuspid valve appears structurally normal.   Mild tricuspid regurgitation. RVSP is 33 mmHg. Aortic Valve   The aortic valve is trileaflet. No hemodynamically significant aortic   stenosis is present. No evidence of aortic valve regurgitation. Pulmonic Valve   The pulmonic valve was not well visualized. Pericardial Effusion   No evidence for hemodynamically significant pericardial effusion. Aorta   Normal aortic root and ascending aorta. Miscellaneous   The inferior vena cava diameter is normal with normal respiratory   variation. Conclusions      Summary   Ejection fraction is visually estimated at 65%. No regional wall motion abnormalities seen. Normal right ventricle structure and function. Physiologic and/or trace mitral regurgitation is present. Cardiac Stress Test 5/27/2020:  1. She experienced chest pain (mid-sternal) following Lexiscan   injection  2. No new arrhythmias  3. STT changes at baseline with +ischemic changes following   Lexiscan injection  4. Nuclear images pending      Cardiac Cath Report 5/28/2020:  ANGIOGRAPHIC FINDINGS:  1. The left main coronary artery arises normally from the left sinus of  Valsalva. It is a good size vessel that bifurcates into left anterior  descending artery and left circumflex artery. 2.  The left anterior descending artery is a mid size vessel, extends  down to the apex. It gives off a mid size diagonal branch.   The LAD has  80% stenosis just distal to the first diagonal and the first

## 2020-06-01 NOTE — PROGRESS NOTES
Continuous  sodium chloride flush 0.9 % injection 10 mL, PRN  magnesium hydroxide (MILK OF MAGNESIA) 400 MG/5ML suspension 30 mL, Daily PRN  aspirin EC tablet 81 mg, Daily  pantoprazole (PROTONIX) tablet 40 mg, QAM AC  sodium chloride flush 0.9 % injection 10 mL, PRN  sodium chloride flush 0.9 % injection 10 mL, 2 times per day  acetaminophen (TYLENOL) tablet 650 mg, Q4H PRN  albuterol (ACCUNEB) nebulizer solution 0.63 mg, Q6H PRN  hydrALAZINE (APRESOLINE) tablet 100 mg, 3 times per day  levothyroxine (SYNTHROID) tablet 25 mcg, Daily  budesonide (PULMICORT) nebulizer suspension 500 mcg, BID    And  Arformoterol Tartrate (BROVANA) nebulizer solution 15 mcg, BID  montelukast (SINGULAIR) tablet 10 mg, Nightly        Review of systems:     Heart: as above   Lungs: as above   Eyes: denies changes in vision or discharge. Ears: denies changes in hearing or pain. Nose: denies epistaxis or masses   Throat: denies sore throat or trouble swallowing. Neuro: denies numbness, tingling, tremors. Skin: denies rashes or itching. : denies hematuria, dysuria   GI: denies vomiting, diarrhea   Psych: denies mood changed, anxiety, depression. Physical exam:    Constitutional: A&O x3, communicates well, no acute distress. Eyes: extraocular muscles intact, PERRL. Normal lids & conjunctiva. No icterus. ENT: clear, no bleeding. No external masses. Lips normal formation. Neck: supple, full ROM, no JVD, no bruits, no lymphadenopathy. No masses. trachea midline. Heart: regular rate & rhythm, normal S1 & S2, no abnormal murmurs. .  No heave. Lungs: CTA. No accessory muscles. Abd: soft, non-tender. Normal bowel sounds. Neuro: Full ROM X 4, EOMI, no tremors. EXT: No bilateral lower extremity edema  Skin: warm, dry, intact. Good turgor. Psych: A&O x 3, normal behavior, not anxious. Assessment/Recommendations  1. Multivessel CAD. Planning for bypass surgery tomorrow. 2. Pulmonary embolism 4/20/2020. Resolved on repeat CAT scan. 3. Hypertension. 4. Hypercholesterolemia. 5. Hypothyroidism. On replacement therapy. 6. Carotid stenosis.

## 2020-06-01 NOTE — PROGRESS NOTES
CC: CP/SOB    Brief HPI:  Patient seen with Dr. Dary Berman. Awake, alert. No complaints.       Past Medical History:   Diagnosis Date    Acid reflux     Asthma     Chronic back pain     Hyperlipidemia     Hypertension     Thyroid disease      Past Surgical History:   Procedure Laterality Date    BACK SURGERY  06/2015    Dr Ev Winslow at [address] [place] [address])  08/2018    HERNIA REPAIR      INTRACAPSULAR CATARACT EXTRACTION Bilateral 2016, 6/2018    KNEE SURGERY      TONSILLECTOMY       Social History     Socioeconomic History    Marital status:      Spouse name: Not on file    Number of children: Not on file    Years of education: Not on file    Highest education level: Not on file   Occupational History    Occupation: teacher- retired      Comment: retired   Social Needs    Financial resource strain: Not on file    Food insecurity     Worry: Not on file     Inability: Not on file   [employer] needs     Medical: Not on file     Non-medical: Not on file   Tobacco Use    Smoking status: Never Smoker    Smokeless tobacco: Never Used   Substance and Sexual Activity    Alcohol use: No     Alcohol/week: 0.0 standard drinks    Drug use: No    Sexual activity: Not Currently   Lifestyle    Physical activity     Days per week: Not on file     Minutes per session: Not on file    Stress: Not on file   Relationships    Social connections     Talks on phone: Not on file     Gets together: Not on file     Attends Sh[employer] service: Not on file     Active member of club or organization: Not on file     Attends meetings of clubs or organizations: Not on file     Relationship status: Not on file    Intimate partner violence     Fear of current or ex partner: Not on file     Emotionally abused: Not on file     Physically abused: Not on file     Forced sexual activity: Not on file   Other Topics Concern    Not on file   Social History Narrative   

## 2020-06-01 NOTE — HOME CARE
[facility] received referral. Will follow after discharge. Spoke with patient and verified demographics. Sandi Capone LPN, [facility].

## 2020-06-02 NOTE — ANESTHESIA PROCEDURE NOTES
Central Venous Line:    A central venous line was placed using ultrasound guidance, in the OR for the following indication(s): central venous access. Sterility preparation included the following: hand hygiene performed prior to procedure, maximum sterile barriers used and sterile technique used to drape from head to toe. The patient was placed in Trendelenburg position. The right internal jugular vein was prepped. The site was prepped with Chloraprep. A 8.5 Fr (size), introducer slick was placed. During the procedure, the following specific steps were taken: target vein identified, needle advanced into vein and blood aspirated and guidewire advanced into vein. Intravenous verification was obtained by ultrasound and venous blood return. Post insertion care included: all ports aspirated, all ports flushed easily, guidewire removed intact, Biopatch applied, line sutured in place and dressing applied. During the procedure the patient experienced: patient tolerated procedure well with no complications. Anesthesia type: localA(n) non-oximetric, 7 (size) Pulmonary Artery Catheter (PAC) was placed through the Introducer CVL in the right internal jugular vein. The PAC placement was confirmed by pressure tracing changes, ALLYN and x-ray. The patient experienced the following events during the procedure: patient tolerated procedure well with no complications. PA Cath placed?: Yes  Staffing  Anesthesiologist: Regan Melgoza DO  Performed: Anesthesiologist   Preanesthetic Checklist  Completed: patient identified, site marked, surgical consent, pre-op evaluation, timeout performed, IV checked, risks and benefits discussed, monitors and equipment checked, anesthesia consent given, oxygen available and patient being monitored

## 2020-06-02 NOTE — PROGRESS NOTES
with intubated on lung protective ventilation  - Sedation: Propofol and fentanyl infusions   - ABGs per protocol and PRN     3. Cardiogenic shock   - Preop normal EF/RV, post op LV 30%, RV moderate dysfunction following arrest in OR with CPR/defibrillation x7/lidocaine gtt started  - IABP inserted intraop, IABP 1:1, good augmentation   - 6/2 IABP and VA ECMO inserted for biventricular failure post CPB, Required epinephrine, vasopressin infusions for hemodynamic support; Started on Milrinone @ 0.25mcg for RV support   - Target MAP >70, Trend LA, CK, mixed venous gases, H/H, CBC, CMP, LDH/mag/phos daily      - VA ECMO (Left Fem-Fem); placed intraop  Flow: 2.5L  Speed: 2360  ECMO FiO2: 60%  Sweep: 1.5  Vent FiO2: 60%  Oxygenator: Minimal clots  Cannulae sites: C/D/I  AC: ACT per perfusion, start systemic Heparin AC when needed; ACT goal 170-220     4. Acute Post Operative Pain   - Fentanyl gtt while intubated    5. Carotid artery stenosis   - Bilateral carotid artery stenosis; Left >90%; Right 70-89%   - Maintain MAP >70 for cerebral perfusion, following with vascular surgery     6. Stress hyperglycemia   - No h/o DM; 2/2 critical illness  - RHI infusion for glycemia control per protocol     7. Acute blood loss anemia  - 4 units RBC intraop  - Monitor H/H, signs of bleeding, transfuse as needed to maintain Hgb >9     8. CHB  - Arrived VPaced @ [de-identified]  - underlying CHB, monitor for intrinsic rhythm recovery, avoid AV jocelin blockades      This patient has a high probability of sudden deterioration, which requires preparedness to urgently intervene. I participated in the decision making process and managed the direct care of the patient that required frequent assessment and treatment. I personally spent 67 minutes of critical care time treating the patient.          Electronically signed by JEANETTE Lorenz CNP on 6/2/2020 at 1:25 PM

## 2020-06-02 NOTE — BRIEF OP NOTE
Brief Postoperative Note      Patient: Marquis Ojead  YOB: 1945  MRN: [id number]    Date of Procedure: 6/2/2020    Pre-Op Diagnosis: MVCAD    Post-Op Diagnosis: Same       Procedure(s):  CABG x2 (LIMA-LAD, SVG-Diag)  EVH  Insertion of IABP  KLS Plating     Surgeon(s):  DO Norberto Mcclendon MD    Assistant:  First Assistant: Sanju Rowe  Physician Assistant: JEANETTE Clarke - CNP; TIFFANY Mcintosh  Resident: Angelo Madison DO    Anesthesia: General    Estimated Blood Loss (mL): less than 50     Complications: None    Specimens:   * No specimens in log *    Implants:  Implant Name Type Inv. Item Serial No.  Lot No. LRB No. Used Action   PLATE STERNAL TI 6 HL 1.8MM Screw/Plate/Nail/Jc PLATE STERNAL TI 6 HL 1.8MM  [employer]  N/A 1 Implanted   PLATE STERNL LK 8 HL LADDER 1.8MM Screw/Plate/Nail/Jc PLATE STERNL LK 8 HL LADDER 1.8MM  [employer]  N/A 1 Implanted   SCREW STERNAL LOCK 2. [id number] Screw/Plate/Nail/Jc SCREW STERNAL LOCK 2. [id number]  [employer]  N/A 14 Implanted         Drains:   Chest Tube 1 Anterior Mediastinal 19 [place] (Active)   Dressing Status Clean;Dry; Intact 6/2/2020  7:51 AM   Dressing Type Dry dressing 6/2/2020  7:51 AM   Site Assessment Clean;Dry; Intact 6/2/2020  7:51 AM       Chest Tube 2 Mediastinal 19 [place] (Active)   Dressing Status Clean;Dry; Intact 6/2/2020  7:52 AM   Dressing Type Dry dressing 6/2/2020  7:52 AM   Site Assessment Clean;Dry; Intact 6/2/2020  7:52 AM       Chest Tube 3 Left Pleural 19 [place] (Active)   Dressing Status Clean;Dry; Intact 6/2/2020  7:52 AM   Dressing Type Dry dressing 6/2/2020  7:52 AM   Site Assessment Dry;Clean; Intact 6/2/2020  7:52 AM       Urethral Catheter Temperature probe;Non-latex 16 fr (Active)         Electronically signed by Frank Garcia DO on 6/2/2020 at 1:03 PM

## 2020-06-02 NOTE — ANESTHESIA PROCEDURE NOTES
Procedure Performed: ALLYN     Start Time:        End Time:      Preanesthesia Checklist:  Patient identified, IV assessed, risks and benefits discussed, monitors and equipment assessed, procedure being performed at surgeon's request and anesthesia consent obtained. General Procedure Information  Diagnostic Indications for Echo:  assessment of surgical repair, hemodynamic monitoring and assessment of valve function  Physician Requesting Echo: Johanna Del Angel DO  Location performed:  OR  Intubated  Bite block placed  Heart visualized  Probe Insertion:  Easy  Probe Type:  3D and mulitplane  Modalities:  3D, color flow mapping, pulse wave Doppler and continuous wave Doppler    Echocardiographic and Doppler Measurements    Ventricles    Right Ventricle:  Cavity size normal.  Hypertrophy not present. Thrombus not present. Global function normal.    Left Ventricle:  Cavity size normal.  Hypertrophy not present. Thrombus not present. Global Function normal.  Ejection Fraction 60%. Ventricular Regional Function:  1- Basal Anteroseptal:  normal  2- Basal Anterior:  normal  3- Basal Anterolateral:  normal  4- Basal Inferolateral:  normal  5- Basal Inferior:  normal  6- Basal Inferoseptal:  normal  7- Mid Anteroseptal:  normal  8- Mid Anterior:  normal  9- Mid Anterolateral:  normal  10- Mid Inferolateral:  normal  11- Mid Inferior:  normal  12- Mid Inferoseptal:  normal  13- Apical Anterior:  normal  14- Apical Lateral:  normal  15- Apical Inferior:  normal  16- Apical Septal:  normal  17- [place]:  normal      Valves    Aortic Valve: Annulus normal.  Stenosis not present. Regurgitation none. Leaflets normal.  Leaflet motions normal.      Mitral Valve: Annulus normal.  Stenosis not present. Regurgitation mild. Leaflets normal.  Leaflet motions normal.      Tricuspid Valve: Annulus normal.  Stenosis not present. Regurgitation mild. Leaflets normal.  Leaflet motions normal.    Pulmonic Valve:   Annulus normal.

## 2020-06-03 PROBLEM — I65.23 BILATERAL CAROTID ARTERY STENOSIS: Status: ACTIVE | Noted: 2020-01-01

## 2020-06-03 PROBLEM — I99.8 ISCHEMIA OF LEFT LOWER EXTREMITY: Status: ACTIVE | Noted: 2020-01-01

## 2020-06-03 PROBLEM — T81.11XA CARDIOGENIC POSTOPERATIVE SHOCK (HCC): Status: ACTIVE | Noted: 2020-01-01

## 2020-06-03 NOTE — OP NOTE
internal mammary artery was dissected free  from the undersurface of the left chest wall with a pedicled technique. All branches were clipped and divided. The patient was administered  systemic heparin for an adequate ACT for bypass. The distal portion of  this artery was clipped and divided. It was noted to be a good conduit. Next, the mammary retractor was removed. A sternal retractor was  placed. The pericardium was opened and the ascending aorta was palpated  and noted to be free of atherosclerotic disease. Suitable areas for  cannulation, cross clamping, and proximal anastomosis were apparent. Next, central cannulation was commenced in a normal fashion of the  ascending aorta and right atrial cannulas. This was followed by  insertion of anterograde and retrograde cardioplegia lines as well as  aortic root vent. The patient was placed on cardiopulmonary bypass and  the distal targets were inspected. On her preoperative angiogram, she  was noted to have a very proximally occluded ostial occlusion actually  of the right coronary artery. This system was inspected throughout its  entire course and there was noted to be no target for bypass including a  very small PDA that we found, which was not sizable as well as the  distal right coronary artery, which was atretic and too small for  bypass. The LAD and first diagonal branch were noted to be good targets  for bypass. Next, the cross clamp was applied, the heart was arrested  with combination of antegrade and retrograde cardioplegia. We achieved  an excellent arrest.  Maintenance doses were administered via the  retrograde cardioplegia cannula every 15 minutes. First, our attention  was turned to the diagonal branch. After arteriotomy, an end-to-side  vein graft anastomosis was created using a running 7-0 Prolene suture. This was tested and had excellent flow and was hemostatic. Next, our  attention was turned to the mid portion of the LAD. After arteriotomy,  an end-to-side LIMA to LAD anastomosis was created here with a running  7-0 Prolene suture. This was tested by removing a bulldog clamp from  the left internal mammary artery and noted to be hemostatic. Next, our  attention was turned to the proximal anastomosis. A 4.8-mm punch was  used to fashion an aortotomy in the ascending aorta. The vein graft was  measured to length and the proximal anastomosis was created with a  running 6-0 Prolene suture. When this was completed, a dose of warm  blood was administered via the retrograde cardioplegia cannula as a \"hot  shot. \"  The cross clamp was removed as well as the bulldog from the left  internal mammary artery. Heart began to reanimate and it was noted to  be in normal sinus rhythm. A ventricular pacing wire was placed, not  used however, secondary to the patient being in normal sinus rhythm. The patient was then weaned from cardiopulmonary bypass with the aid of  some inotropes. This, however, failed secondary to the findings on her  postoperative echo, which was that she had anterior wall akinesis as  well as septal akinesis. This was a new finding for her based on  comparison with her preoperative echo. With this in mind, both grafts  were once again checked and noted to be intact and not kinked. There  was no other obvious cause for this, so we elected to go back on bypass,  cross clamp once again and re-arrest the heart. We then took down the  original LIMA to LAD anastomosis. There was no obvious occlusion of  either the inflow or outflow aspect of the LAD, and the left internal  mammary artery was also noted to be patent still with good flow. This  was once again prepared and we redid the anastomosis after gentle  probing both proximally and distally from the arteriotomy to ensure that  we were establishing flow into the true lumen of the vessel with care.    The layers of plaque within the arterial wall were also tacked up

## 2020-06-03 NOTE — PROGRESS NOTES
illness, h/o asthma   - Pulmonology following preop for dyspnea, recent PE-- recently started on daily prednisone; stress dosed intraop; continue stress dose steroids    - Maintain ETT with intubated on lung protective ventilation  - Sedation: Propofol and fentanyl infusions   - ABGs per protocol and PRN     3. Cardiogenic shock   - Preop normal EF/RV, post op LV 30%, RV moderate dysfunction following Vfib arrest in OR with CPR/defibrillation x7/lidocaine gtt started  - IABP inserted intraop, IABP 1:1, good augmentation   - 6/2 IABP and VA ECMO inserted for biventricular failure post CPB, Required epinephrine, vasopressin infusions for hemodynamic support; Started on Milrinone @ 0.25mcg for RV support   - 6/3 Vasopressin off, Epi (3mcg), Milrinone (0.25mcg), Lidocaine 2mg, Amio gtt started at 0.5mg; IABP 1:2, IABP held and ECMO flow decreased to 0.5L with MAP decreased >20 points, plan for ALLYN to assess biventricular function. CK uptrending, reperfusion cannula to be inserted per Vascular surgery. LA WNL. - Target MAP >70, continue to trend LA, CK, mixed venous gases, H/H, CBC, CMP, LDH/mag/phos/TEG daily      - VA ECMO (Left Fem-Fem); placed intraop  Flow: 2.3L  Speed: 2210  ECMO FiO2: 60%  Sweep: 0.3  Vent FiO2: 60%  Oxygenator: Minimal clots  Cannula sites: C/D/I  AC: ACT per perfusion, on systemic Heparin; ACT goal 170-220     4. Acute Post Operative Pain   - Fentanyl gtt while intubated    5. Carotid artery stenosis   - Bilateral carotid artery stenosis; Left >90%; Right 70-89%   - Maintain MAP >70 for cerebral perfusion, following with vascular surgery     6. Stress hyperglycemia   - No h/o DM; 2/2 critical illness  - RHI infusion for glycemia control per protocol, SSI q 4 hours when off gtt     7. Acute blood loss anemia  - 4 units RBC intraop, one unit postop in ICU   - H/H 8.5/25.5, transfuse one unit RBCs   - Monitor H/H, transfuse as needed to maintain Hgb >9     8.  CHB  - Arrived VPaced @ [de-identified] for

## 2020-06-03 NOTE — PROGRESS NOTES
133 lb (60.3 kg)(5/28 first measured )  · Usual Body Wt: 134 lb (60.8 kg)(7/2019 EMR actual )  · % Weight Change: Wt hx appears stable per EMR review x 11 mon   · Ideal Body Wt: 105 lb (47.6 kg), % Ideal Body 122%  · BMI Classification: BMI 18.5 - 24.9 Normal Weight    Nutrition Interventions:   Continued Inpatient Monitoring    Nutrition Evaluation:   · Evaluation: Goals set   · Goals: Nutrition progression when medically appropriate     · Monitoring: Nutrition Progression, Skin Integrity, Wound Healing, I&O, Weight, Mental Status/Confusion, Monitor Hemodynamic Status, Monitor Bowel Function, Pertinent Labs      Electronically signed by Maxine Neville RD, LD on 6/3/20 at 1:39 PM EDT    Contact Number: [phone]

## 2020-06-03 NOTE — PROGRESS NOTES
reviewed. Assessment:      1. Multivessel CAD s/p CABG  2. Intra-op cardiopulmonary arrest v.fib shocked x7  3. Severe mitral regurg  4. Cardiogenic shock  5. Recent PE - resolved on repeat imaging      Plan:     · adequate pulmonary mechanics on ventilator with adequate oxygenation and gas transfer  · ECMO support, anticoagulation for circuit  · Continue anticoagulation if circuit removed for recent PE  · Wean pressors as tiffani  · antiarrthymics  · bronchopulm hygiene, bronchodilators  · Follow CXR    Thank you for allowing me to participate in the care of Stefan Mann. Please feel free to call with questions.      Electronically signed by Jason Alberto,  on 6/3/2020 at 3:01 PM

## 2020-06-03 NOTE — PROGRESS NOTES
PRN  calcium gluconate 1 g in dextrose 5 % 100 mL IVPB, PRN  insulin lispro (HUMALOG) injection vial 0-18 Units, Q4H  insulin lispro (HUMALOG) injection vial 0-18 Units, Q4H  EPINEPHrine (EPINEPHrine HCL) 5 mg in dextrose 5 % 250 mL infusion, Continuous  vasopressin 20 Units in dextrose 5 % 100 mL infusion, Continuous  lidocaine (CARDIAC INFUSION) 2 g in dextrose 5% 500 mL infusion, Continuous  milrinone (PRIMACOR) 20 mg in dextrose 5 % 100 mL infusion, Continuous  fentaNYL 5 mcg/ml in 0.9%  ml infusion, Continuous  heparin 25,000 units in dextrose 5% 250 mL infusion, Continuous  hydrocortisone sodium succinate PF (SOLU-CORTEF) injection 100 mg, Q8H  pantoprazole (PROTONIX) injection 40 mg, Daily    And  sodium chloride (PF) 0.9 % injection 10 mL, Daily  atorvastatin (LIPITOR) tablet 40 mg, Nightly  budesonide (PULMICORT) nebulizer suspension 500 mcg, BID    And  Arformoterol Tartrate (BROVANA) nebulizer solution 15 mcg, BID  montelukast (SINGULAIR) tablet 10 mg, Nightly        Review of systems:     Unobtainable         Physical exam:    Constitutional: As above. Eyes: Closed  ENT: clear, no bleeding. No external masses. Lips normal formation. Neck: supple, no JVD, no bruits, no lymphadenopathy. No masses. trachea midline. Heart: regular rate & rhythm, normal S1 & S2, no abnormal murmurs. No heave. Lungs: CTA. No accessory muscles. Abd: soft, non-tender. Normal bowel sounds. Neuro: Full ROM X 4, EOMI, no tremors. EXT: Mild mottling and edema of the left lower extremity. Decreased pulses in the left lower extremity. Skin: warm, dry, intact. Good turgor. Psych: As above. Assessment/Recommendations  1. Multivessel CAD. Status post two-vessel CABG. Intra-Op cardiac arrest.  Intra-Op transient severe mitral regurgitation. On pressors, balloon pump, and ECMO. 2. Pulmonary embolism 4/20/2020. Resolved on repeat CAT scan.   3. Baseline hypertension. 4. Hypercholesterolemia. 5. Hypothyroidism. On replacement therapy. 6. Carotid stenosis.

## 2020-06-03 NOTE — PROCEDURES
DATE OF PROCEDURE: 6/3/2020     PHYSICIAN: Silver Ricks M.D. PRE-PROCEDURE DIAGNOSIS: Left leg ischemia. POST-PROCEDURE DIAGNOSIS: Same    PROCEDURE: Insertion of left superficial femoral artery catheter. ANESTHESIA: Intravenous sedation. ESTIMATED BLOOD LOSS: Minimal     COMPLICATIONS: None    DESCRIPTION OF PROCEDURE: The patient was identified and the procedure was confirmed. The left groin was prepped and draped in the usual sterile fashion. Under ultrasound guidance, the left superficial femoral artery was percutaneously entered and a 6-Irish sheath was inserted antegrade towards the foot. The side port was connected to the ECMO catheter allowing flow through the sheath. The sheath was secured with a silk suture and covered with a dressing.

## 2020-06-03 NOTE — PROGRESS NOTES
Avril Jensen was receiving oral levothyroxine and intravenous levothyroxine has been ordered. Levothyroxine has a long half-life. Per the [address] restrictions for IV levothyroxine, this order has been modified to start 6 days from the date ordered. Please contact the Pharmacy with any questions or concerns.

## 2020-06-03 NOTE — PROGRESS NOTES
Vascular        Chief complaint : Patient seen for evaluation of vascular status of the left leg, possible consideration for removal of ECMO versus placement of a reperfusion cannula into the distal arterial circulation    This patient has a complicated history, has undergone cardiac bypass surgery on 2 June as outlined below    PROCEDURES PERFORMED:  1. Coronary artery bypass grafting x2 using left internal mammary  artery to the left anterior descending artery, reverse saphenous vein  graft to the diagonal branch. 2.  Lower extremity endoscopic vein harvest.  3.  Insertion of intra-aortic balloon pump.   4.  Rigid sternal fixation with KLS plating system.     Subsequently patient developed ventricular fibrillation, cardiogenic shock, underwent placement of ECMO and the intra-aortic balloon     Today patient was found to have absent Doppler signals of the left lower extremity, with elevated CK levels, compromise of arterial circulation distal to the ECMO was suspected and vascular service is consulted for evaluation including possible placement of a distal reperfusion cannula versus consideration of removal of ECMO, if the ALLYN revealed satisfactory right ventricle function    When I came to see patient, patient intubated and sedated and no history could be gathered from the patient, most information was gathered from the patient's chart, and discussing with her cardiothoracic surgeons and the nursing staff    Is also noted, patient was in fact seen by Dr. Marina Goncalves, preoperatively, for bilateral carotid artery stenosis, being asymptomatic we will follow her patient postoperative [facility] with a CT angiogram if necessary        Subjective: Patient is intubated and sedated, no history could be gathered from the patient  Objective:    BP (!) 190/73   Pulse 102   Temp 98.2 °F (36.8 °C) (Core)   Resp 22   Ht 5' 1\" (1.549 m)   Wt 128 lb 12.8 oz (58.4 kg)   SpO2 100%   BMI 24.34 kg/m²     General: alert and surgeons, along with the perfusionist, in the ideal situation, patient would benefit from reperfusion cannula for distal arterial perfusion and maintenance of arterial flow to the left leg to prevent further worsening of the vascular status    However there was issue, potentially, if the ALLYN at the bedside reveals satisfactory right ventricle function, patient can undergo removal of ECMO catheter, that time consider left femoral artery exploration thromboembolectomy    Subsequently decision was made by the cardiothoracic surgeons, that the ECMO cannot be removed today and would prefer to have distal arterial perfusion cannula done    I have personally reviewed with Dr. Maribel Rousseau the procedure    Subsequently discussed the ultrasound department, have performed bedside Doppler scanning of the vessels in the left leg, superficial femoral artery is patent with color flow    At this juncture, I have discussed with Dr. Ricarda Hernandez who came to see the patient, who agreed to perform the procedure, with a micropuncture kit and placement of 6 Maori catheter for distal arterial perfusion    I have discussed with the patient's ,, known to me from the past with my vascular intervention on him explained him all options, risks benefits and alternatives, including possibility of serious complications due to the procedure were explained and agreed for the  placement of a distal reperfusion cannula by Dr. Ricarda Hernandez and associates, he understands and agrees    All his questions were answered    I will also discuss Dr. Amrita Patiño regarding follow-up care on the patient, when the time comes regarding removal of the ECMO catheter and cannula          Kandis Stone

## 2020-06-04 NOTE — PROGRESS NOTES
a high probability of sudden deterioration, which requires preparedness to urgently intervene. I participated in the decision making process and managed the direct care of the patient that required frequent assessment and treatment. I personally spent 58 minutes of critical care time treating the patient.          Dispo: [facility]ICU    Electronically signed by JEANETTE Felipe CNP on 6/4/2020 at 8:30 AM

## 2020-06-04 NOTE — PROGRESS NOTES
Vascular Surgery Progress Note    Pt is being seen in f/u today regarding patient is critically ill. Currently still on ECMO. She is recently had a an Impella placed. Subjective:  Bora Nugent is a 77 y.o. female left lower extremity ischemia secondary to ECMO. We are also evaluating her secondary to right groin hematoma status post balloon pump removal.  We are asked to see the patient secondary to right groin hematoma. At this point when we arrived they were holding pressure she had a balloon pump removed followed by expansion of a hematoma when she was brought back into the ICU. Since that time her pressure is been since more stable. And she has not had any more expansion of the hematoma.     Current Medications:    epoprostenol (VELETRI) nebulization solution 50 ng/kg/min (06/04/20 1400)    nitroGLYCERIN Stopped (06/04/20 1130)    IV infusion builder      amiodarone 450mg/250ml D5W infusion 0.5 mg/min (06/04/20 1130)    sodium bicarbonate infusion 50 mL/hr at 06/04/20 1210    sodium chloride 100 mL/hr (06/04/20 1130)    propofol 15 mcg/kg/min (06/04/20 1209)    sodium chloride      norepinephrine      insulin 9.1 Units/hr (06/04/20 1400)    dextrose      nitroGLYCERIN      EPINEPHrine infusion 3 mcg/min (06/04/20 1130)    vasopressin (Septic Shock) infusion 0.02 Units/min (06/04/20 1200)    lidocaine 1 mg/min (06/04/20 1130)    milrinone 0.5 mcg/kg/min (06/04/20 1130)    fentaNYL 5 mcg/ml in 0.9%  ml infusion 25 mcg/hr (06/04/20 1207)    heparin (porcine) 9 Units/kg/hr (06/04/20 1413)      sodium chloride flush, potassium chloride, magnesium sulfate, acetaminophen, acetaminophen, oxyCODONE **OR** oxyCODONE, fentanNYL **OR** fentanNYL, magnesium hydroxide, ondansetron, propofol, albumin human, sodium chloride, norepinephrine, insulin, glucose, dextrose, glucagon (rDNA), dextrose, nitroGLYCERIN, aluminum & magnesium hydroxide-simethicone, calcium gluconate IVPB    sodium chloride 20 mL Intravenous Once    calcium chloride        cefepime  2 g Intravenous Q12H    And    sodium chloride   Intravenous Q12H    [START ON 6/10/2020] levothyroxine  12.5 mcg Intravenous Daily    sodium chloride flush  10 mL Intravenous 2 times per day    sennosides-docusate sodium  1 tablet Oral BID    chlorhexidine  15 mL Mouth/Throat BID    magnesium oxide  400 mg Oral Daily    mupirocin   Nasal BID    ipratropium-albuterol  1 ampule Inhalation Q4H WA    insulin lispro  0-18 Units Subcutaneous Q4H    hydrocortisone sodium succinate PF  100 mg Intravenous Q8H    pantoprazole  40 mg Intravenous Daily    And    sodium chloride (PF)  10 mL Intravenous Daily    budesonide  0.5 mg Nebulization BID    And    Arformoterol Tartrate  15 mcg Nebulization BID    montelukast  10 mg Oral Nightly        PHYSICAL EXAM:    BP 90/70   Pulse 102   Temp 98.1 °F (36.7 °C) (Core)   Resp 14   Ht 5' 1\" (1.549 m)   Wt 128 lb 12.8 oz (58.4 kg)   SpO2 100%   BMI 24.34 kg/m²     Intake/Output Summary (Last 24 hours) at 6/4/2020 1431  Last data filed at 6/4/2020 1408  Gross per 24 hour   Intake 6443 ml   Output 1323 ml   Net 5120 ml          General: She is sedated  Skin: Right groin demonstrates ecchymosis. The thigh is currently soft. There is no expansion of any hematoma. The right foot is pink. The left foot is cold and discolored. This is consistent with prior examinations according to the notes  HEENT: Normocephalic atraumatic trachea appears to be midline. She is currently on a ventilator  CVS: Currently regular rate and rhythm  Resp: Coarse bilateral breath sounds  Abd: Soft nontender no rebound or guarding. Extremities: Right groin hematoma is currently soft. Thigh hematoma is soft. There is no evidence of expansion. There is no palpable thrill. Right extremity is pink.   Left extremity see above  Neuro: Could not be thoroughly assessed secondary to sedation    LABS:    Lab Results   Component Value Date    WBC 13.8 (H) 06/04/2020    HGB 7.9 (L) 06/04/2020    HCT 24.1 (L) 06/04/2020    PLT 52 (L) 06/04/2020    PROTIME 13.4 (H) 06/03/2020    INR 1.2 06/03/2020    APTT >240.0 (H) 06/04/2020    K 4.2 06/04/2020    BUN 15 06/04/2020    CREATININE 1.0 06/04/2020       RADIOLOGY:  FLUORO FOR SURGICAL PROCEDURES   Final Result   Intraoperative fluoroscopy provided for insertion of   Impella CP LVAD. The study was dictated by Dayron Vera PA-C and Cassandra Pool MD   reviewed and concurred with the findings. XR CHEST PORTABLE   Final Result   Worsening left lower lobe infiltrate. XR Chest Abdomen Ng Placement   Final Result   NG tube at the expected level the stomach                     US DUP LOWER EXTREMITY LEFT ARTERIES   Final Result   1. In the limits of the region evaluated on the submitted images the   superficial femoral artery is patent            US GUIDED INTRAOPERATIVE   Final Result   1. In the limits of the region evaluated on the submitted images the   superficial femoral artery is patent            XR CHEST PORTABLE   Final Result   Stable postoperative chest with minimal atelectasis/pleural effusions   in the left base. XR CHEST PORTABLE   Final Result   Postoperative changes as outlined above. No active process. US DUP UPPER EXTREMITY RIGHT ARTERIES   Final Result      No evidence for pseudoaneurysm or stenosis on ultrasound of the   arterial structures of the right upper extremity. US CAROTID ARTERY BILATERAL   Final Result      Based on peak systolic ratios, stenosis of 50-69% appears present   within the internal carotid arteries. On the basis of peak systolic velocities, the left internal carotid   artery would be greater than 90% blocked and the right internal   carotid artery would be 70-89% blocked.       No evidence for subclavian steal.      US DUP LOWER EXTREMITY MAPPING BILAT VENOUS   Final Result      VL RHIANNA BILATERAL LIMITED 1-2 LEVELS   Final Result      NM Cardiac Stress Test Nuclear Imaging   Final Result   1. No reversible perfusion defect   2. Ejection fraction is 75 %. 3. No significant wall motion abnormality         CTA PULMONARY W CONTRAST   Final Result   No acute pulmonary emboli. This report has been electronically signed by Papo Guevara MD.      XR CHEST PORTABLE   Final Result   No acute cardiopulmonary process. XR CHEST PORTABLE    (Results Pending)       ASSESSMENT/PLAN:   · #1 status post balloon pump removal with bleeding. At this point this appears to have stopped with pressure held for over an hour. When I examined her the first time it was more tense. Now it is soft there is no evidence of any expansion. This will need to be watched very closely. Left lower extremity is still cold and discolored consistent with prior examinations. The right foot is pink. At this point she remains critically ill. We will see how she progresses. I will defer to critical care at this point if there is any issues they are to call us with regards to her right groin and/or if she bleeds or drops her pressures. As far as the left extremity unfortunately she has an arterial catheter in place for ECMO support. She also has an additional sheath in place for distal perfusion. Not much more can be offered right now until she is stable and at the ECMO catheter is able to be removed.         Electronically signed by Caesar Larose MD on 6/4/2020 at 2:31 PM

## 2020-06-04 NOTE — PROGRESS NOTES
General: No focal deficit present. Pertinent/ New Labs and Imaging Studies     Pulmonary Function Testing personally reviewed and interpreted. PERTINENT LAB RESULTS: Labs reviewed. DIAGNOSTICS: Pertinent imaging reviewed. Assessment:      1. Multivessel CAD s/p CABG  2. Intra-op cardiopulmonary arrest v.fib shocked x7  3. S/p 6/4 R axillary cutdown with insertion of Impella   4. Severe mitral regurg  5. Cardiogenic shock  6. Recent PE - resolved on repeat imaging  7. ALLYN showed continued LV depression , RV improved      Plan:     · adequate pulmonary mechanics on ventilator with adequate oxygenation and gas transfer  · V-A ECMO support, anticoagulation for circuit  · Continue anticoagulation if circuit removed for recent PE  · Wean pressors as tiffani  · antiarrthymics  · bronchopulm hygiene, bronchodilators  · Follow CXR      Thank you for allowing me to participate in the care of Palomo Camp. Please feel free to call with questions. Electronically signed by JEANETTE Santoro on 6/4/2020 at 12:11 PM     I personally saw, examined, and cared for the patient. Labs, medications, radiographs reviewed. I agree with history exam and plans detailed in NP note.       Electronically signed by Arin Gaffney DO on 6/4/2020 at 4:22 PM

## 2020-06-04 NOTE — ANESTHESIA PRE PROCEDURE
Department of Anesthesiology  Preprocedure Note       Name:  Avril Jensen   Age:  77 y.o.  :  1945                                          MRN:  [id number]         Date:  2020      Surgeon: Damon Archuleta):  Ronny Bay DO    Procedure: Procedure(s):  CABG CORONARY ARTERY BYPASS, ALLYN    Medications prior to admission:   Prior to Admission medications    Medication Sig Start Date End Date Taking? Authorizing Provider   predniSONE (DELTASONE) 10 MG tablet Take 4 tablets by mouth daily for 3 days, THEN 3 tablets daily for 3 days, THEN 2 tablets daily for 3 days, THEN 1 tablet daily for 3 days.  20 Yes Viktor Becker DO   albuterol sulfate HFA (PROVENTIL HFA) 108 (90 Base) MCG/ACT inhaler Inhale 2 puffs into the lungs every 6 hours as needed for Wheezing or Shortness of Breath 20  Yes Viktor Becker DO   hydrALAZINE (APRESOLINE) 100 MG tablet Take 1 tablet by mouth 3 times daily 20 Yes Annalee Morel DO   guaiFENesin (MUCINEX) 600 MG extended release tablet Take 2 tablets by mouth 2 times daily as needed for Congestion 20 Yes Jl Butts, DO   levocetirizine (XYZAL) 5 MG tablet Take 5 mg by mouth nightly   Yes Historical Provider, MD   budesonide-formoterol (SYMBICORT) 80-4.5 MCG/ACT AERO Inhale 2 puffs into the lungs 2 times daily 20  Yes Kelley Yarbrough MD   pravastatin (PRAVACHOL) 20 MG tablet Take 1 tablet by mouth nightly 3/30/20  Yes Annalee Morel DO   gabapentin (NEURONTIN) 100 MG capsule TAKE 1 CAPSULE BY MOUTH 2 TIMES DAILY FOR 30 DAYS 20  Yes Historical Provider, MD   montelukast (SINGULAIR) 10 MG tablet Take 10 mg by mouth nightly   Yes Historical Provider, MD   amLODIPine (NORVASC) 5 MG tablet Take 1 tablet by mouth nightly 3/20/20 6/18/20 Yes Annalee Morel DO   levothyroxine (SYNTHROID) 25 MCG tablet Take 1 tablet by mouth daily 3/16/20  Yes Annalee Morel DO   omeprazole (PRILOSEC) 40 MG delayed release capsule Take 40 mg by mouth 2 times R; NOVOLIN R) 100 Units in sodium chloride 0.9 % 100 mL infusion  1 Units/hr Intravenous Continuous PRN Mariana Heredia, APRN - CNP 8.8 mL/hr at 06/04/20 0626 8.75 Units/hr at 06/04/20 0626    glucose (GLUTOSE) 40 % oral gel 15 g  15 g Oral PRN Mariana Pitts, APRN - CNP        dextrose 50 % IV solution  12.5 g Intravenous PRN Mariana Heredia, APRN - CNP        glucagon (rDNA) injection 1 mg  1 mg Intramuscular PRN Katelyne Heredia, APRN - CNP        dextrose 5 % solution  100 mL/hr Intravenous PRN Katelyne Heredia, APRN - CNP        nitroGLYCERIN 50 mg in dextrose 5% 250 mL infusion  10 mcg/min Intravenous Continuous PRN Mariana Heredia, APRN - CNP        aluminum & magnesium hydroxide-simethicone (MAALOX) 046-729-46 MG/5ML suspension 30 mL  30 mL Per NG tube Q4H PRN Mariana Heredia, APRN - CNP        calcium gluconate 1 g in dextrose 5 % 100 mL IVPB  1 g Intravenous PRN Katelyne Heredia, APRN - CNP   Stopped at 06/04/20 0729    insulin lispro (HUMALOG) injection vial 0-18 Units  0-18 Units Subcutaneous Q4H Mariana Heredia, APRN - CNP        EPINEPHrine (EPINEPHrine HCL) 5 mg in dextrose 5 % 250 mL infusion  1 mcg/min Intravenous Continuous Sofie Juan Luis, APRN - CNP 6 mL/hr at 06/04/20 0707 2 mcg/min at 06/04/20 0707    vasopressin 20 Units in dextrose 5 % 100 mL infusion  0.02 Units/min Intravenous Continuous Sofie Juan Luis, APRN - CNP   Stopped at 06/04/20 0615    lidocaine (CARDIAC INFUSION) 2 g in dextrose 5% 500 mL infusion  2 mg/min Intravenous Continuous Sofie Juan Luis, APRN - CNP 30 mL/hr at 06/03/20 1832 2 mg/min at 06/03/20 1832    milrinone (PRIMACOR) 20 mg in dextrose 5 % 100 mL infusion  0.25 mcg/kg/min Intravenous Continuous Sofie Juan Luis, APRN - CNP 4.4 mL/hr at 06/03/20 1054 0.25 mcg/kg/min at 06/03/20 1054    fentaNYL 5 mcg/ml in 0.9%  ml infusion  25 mcg/hr Intravenous Continuous Sofie Juan Luis, APRN - CNP 5 mL/hr at 06/03/20 0800 25 mcg/hr at 06/03/20 0800    heparin 25,000 units in kg/m².    CBC:   Lab Results   Component Value Date    WBC 19.4 06/04/2020    RBC 2.68 06/04/2020    HGB 7.8 06/04/2020    HCT 23.6 06/04/2020    MCV 93.7 06/04/2020    RDW 15.3 06/04/2020    PLT 97 06/04/2020       CMP:   Lab Results   Component Value Date     06/04/2020    K 4.4 06/04/2020    K 3.9 05/26/2020    CL 99 06/04/2020    CO2 23 06/04/2020    BUN 15 06/04/2020    CREATININE 1.0 06/04/2020    GFRAA >60 06/04/2020    LABGLOM 54 06/04/2020    GLUCOSE 225 06/04/2020    GLUCOSE 118 02/23/2011    PROT 4.1 06/04/2020    CALCIUM 7.7 06/04/2020    BILITOT 0.5 06/04/2020    ALKPHOS 31 06/04/2020     06/04/2020    ALT 40 06/04/2020       POC Tests: No results for input(s): POCGLU, POCNA, POCK, POCCL, POCBUN, POCHEMO, POCHCT in the last 72 hours.     Coags:   Lab Results   Component Value Date    PROTIME 13.4 06/03/2020    INR 1.2 06/03/2020    APTT 53.7 06/04/2020       HCG (If Applicable): No results found for: PREGTESTUR, PREGSERUM, HCG, HCGQUANT     ABGs:   Lab Results   Component Value Date    PO2ART 53.7 06/04/2020    DXG7PMJ 59.7 06/04/2020    LJA3KLC 27.2 06/04/2020        Type & Screen (If Applicable):  No results found for: LABABO, LABRH    Drug/Infectious Status (If Applicable):  No results found for: HIV, HEPCAB    COVID-19 Screening (If Applicable):   Lab Results   Component Value Date    COVID19 Not Detected 05/20/2020         EKG 5/26/2020:  Ventricular Rate 84  BPM Final 05/26/2020  1:07 AM [facility]   Atrial Rate 84  BPM Final 05/26/2020  1:07 AM [facility]   P-R Interval 150  ms Final 05/26/2020  1:07 AM [facility]   QRS Duration 88  ms Final 05/26/2020  1:07 AM [facility]   Q-T Interval 382  ms Final 05/26/2020  1:07 AM [facility]   QTc Calculation (Baradha) 451  ms Final 05/26/2020  1:07 AM [facility]   P [place] 65  degrees Final 05/26/2020  1:07 AM [facility]   R [place] 10  degrees Final 05/26/2020  1:07 AM [facility]   T [place] 23  degrees Final 05/26/2020  1:07 AM [facility]   Testing Performed By     Lab CORONARY ARTERY BYPASS, ALLYN    Medications prior to admission:   Prior to Admission medications    Medication Sig Start Date End Date Taking? Authorizing Provider   predniSONE (DELTASONE) 10 MG tablet Take 4 tablets by mouth daily for 3 days, THEN 3 tablets daily for 3 days, THEN 2 tablets daily for 3 days, THEN 1 tablet daily for 3 days.  5/23/20 6/4/20 Yes Viktor Becker, DO   albuterol sulfate HFA (PROVENTIL HFA) 108 (90 Base) MCG/ACT inhaler Inhale 2 puffs into the lungs every 6 hours as needed for Wheezing or Shortness of Breath 5/23/20  Yes Viktor Becker DO   hydrALAZINE (APRESOLINE) 100 MG tablet Take 1 tablet by mouth 3 times daily 5/22/20 6/21/20 Yes Annalee Morel DO   guaiFENesin (MUCINEX) 600 MG extended release tablet Take 2 tablets by mouth 2 times daily as needed for Congestion 5/20/20 6/19/20 Yes Jl Butts,    levocetirizine (XYZAL) 5 MG tablet Take 5 mg by mouth nightly   Yes Historical Provider, MD   budesonide-formoterol (SYMBICORT) 80-4.5 MCG/ACT AERO Inhale 2 puffs into the lungs 2 times daily 4/30/20  Yes Kelley Yarbrough MD   pravastatin (PRAVACHOL) 20 MG tablet Take 1 tablet by mouth nightly 3/30/20  Yes Annalee Morel DO   gabapentin (NEURONTIN) 100 MG capsule TAKE 1 CAPSULE BY MOUTH 2 TIMES DAILY FOR 30 DAYS 2/28/20  Yes Historical Provider, MD   montelukast (SINGULAIR) 10 MG tablet Take 10 mg by mouth nightly   Yes Historical Provider, MD   amLODIPine (NORVASC) 5 MG tablet Take 1 tablet by mouth nightly 3/20/20 6/18/20 Yes Annalee Morel DO   levothyroxine (SYNTHROID) 25 MCG tablet Take 1 tablet by mouth daily 3/16/20  Yes Annalee Morel DO   omeprazole (PRILOSEC) 40 MG delayed release capsule Take 40 mg by mouth 2 times daily  3/26/19  Yes Historical Provider, MD   guaiFENesin (MUCINEX) 600 MG extended release tablet Take 2 tablets by mouth 2 times daily 5/20/20   Annalee Buccino, DO   hydrALAZINE (APRESOLINE) 50 MG tablet Take 1 tablet by mouth every 8 hours  Patient not taking: Reported on 5/26/2020 4/29/20   Annalee Morel DO   meloxicam (MOBIC) 7.5 MG tablet Take 7.5 mg by mouth daily    Historical Provider, MD   Probiotic Product (PROBIOTIC PO) Take 1 capsule by mouth daily    Historical Provider, MD       Current medications:    Current Facility-Administered Medications   Medication Dose Route Frequency Provider Last Rate Last Dose    0.9 % sodium chloride bolus  20 mL Intravenous Once JEANETTE Ramos - BRET        amiodarone (CORDARONE) 450 mg in dextrose 5 % 250 mL infusion  0.5 mg/min Intravenous Continuous JEANETTE Ling - CNP 16.7 mL/hr at 06/03/20 2217 0.5 mg/min at 06/03/20 2217    [START ON 6/10/2020] levothyroxine (SYNTHROID) injection 12.5 mcg  12.5 mcg Intravenous Daily Sofie Mcknight APRN - CNP        sodium bicarbonate 50 mEq in dextrose 5 % 1,000 mL infusion   Intravenous Continuous Oscar Cleaning APRN - CNP 50 mL/hr at 06/03/20 1509      0.9 % sodium chloride infusion  30 mL/hr Intravenous Continuous Tom Mendoza APRN - CNP 30 mL/hr at 06/03/20 1832 30 mL/hr at 06/03/20 1832    sodium chloride flush 0.9 % injection 10 mL  10 mL Intravenous 2 times per day Tom Mendoza, APRN - CNP        sodium chloride flush 0.9 % injection 10 mL  10 mL Intravenous PRN Tom Mendoza, APRN - CNP        potassium chloride 20 mEq/50 mL IVPB (Central Line)  20 mEq Intravenous PRN Tom Mendoza APRN - CNP   Stopped at 06/03/20 0626    magnesium sulfate 2 g in 50 mL IVPB premix  2 g Intravenous PRN Tom Mendoza, APRN - CNP        acetaminophen (TYLENOL) tablet 650 mg  650 mg Oral Q4H PRN Tom Mendoza, APRN - CNP        acetaminophen (TYLENOL) suppository 650 mg  650 mg Rectal Q4H PRN Tom Mendoza, APRN - CNP        oxyCODONE (ROXICODONE) immediate release tablet 5 mg  5 mg Oral Q4H PRN JEANETTE Dalton - CNP        Or    oxyCODONE (ROXICODONE) immediate release tablet 10 mg  10 mg Oral Q4H PRN JEANETTE Dalton - CNP        fentaNYL (SUBLIMAZE) 100 mg at 06/04/20 0210    pantoprazole (PROTONIX) injection 40 mg  40 mg Intravenous Daily Sofie Juan Luis, APRN - CNP   40 mg at 06/04/20 0734    And    sodium chloride (PF) 0.9 % injection 10 mL  10 mL Intravenous Daily Sofie Juan Luis, APRN - CNP   10 mL at 06/04/20 0734    atorvastatin (LIPITOR) tablet 40 mg  40 mg Oral Nightly Marcelyn Van, APRN - CNP   40 mg at 06/01/20 2002    budesonide (PULMICORT) nebulizer suspension 500 mcg  0.5 mg Nebulization BID Marcelyn Schroeder, APRN - CNP   500 mcg at 06/03/20 2057    And    Arformoterol Tartrate (BROVANA) nebulizer solution 15 mcg  15 mcg Nebulization BID Marcelyn Schroeder, APRN - CNP   15 mcg at 06/03/20 2056    montelukast (SINGULAIR) tablet 10 mg  10 mg Oral Nightly Marcelyn Schroeder, APRN - CNP   10 mg at 06/01/20 2002       Allergies:     Allergies   Allergen Reactions    Medrol [Methylprednisolone] Itching     Severe Itching all over     Biaxin [Clarithromycin] Nausea Only    Pcn [Penicillins]      UNKNOWN REACTION AS A CHILD       Problem List:    Patient Active Problem List   Diagnosis Code    Acid reflux K21.9    Mild intermittent asthma without complication B13.54    Seasonal allergic rhinitis due to pollen J30.1    Essential hypertension I10    Mixed hyperlipidemia E78.2    Acquired hypothyroidism E03.9    Discogenic thoracic pain M54.6    Chronic sinusitis J32.9    Chest pain R07.9    Chest pain, unspecified R07.9    Acute pulmonary embolism without acute cor pulmonale (HCC) I26.99    Hypoxia R09.02    Dyspnea R06.00    Nonrheumatic tricuspid valve regurgitation I36.1    Hypokalemia E87.6    ANG (dyspnea on exertion) R06.09    Ischemia of left lower extremity I99.8    Bilateral carotid artery stenosis I65.23    Cardiogenic postoperative shock (HCC) T81.11XA       Past Medical History:        Diagnosis Date    Acid reflux     Asthma     Cardiogenic postoperative shock (HCC) 6/3/2020    Chronic back pain     Hyperlipidemia     fraction is visually estimated at 65%. No regional wall motion   abnormalities seen. Normal left ventricular wall thickness. Left ventricle   size is normal. Normal left ventricular diastolic filling pattern for age. Right Ventricle   Normal right ventricle structure and function. Left Atrium   Left atrial volume index of 22 ml per meters squared BSA. Right Atrium   Normal right atrium. Mitral Valve   Structurally normal mitral valve. No evidence of mitral valve stenosis. Physiologic and/or trace mitral regurgitation is present. Tricuspid Valve   The tricuspid valve appears structurally normal.   Mild tricuspid regurgitation. RVSP is 33 mmHg. Aortic Valve   The aortic valve is trileaflet. No hemodynamically significant aortic   stenosis is present. No evidence of aortic valve regurgitation. Pulmonic Valve   The pulmonic valve was not well visualized. Pericardial Effusion   No evidence for hemodynamically significant pericardial effusion. Aorta   Normal aortic root and ascending aorta. Miscellaneous   The inferior vena cava diameter is normal with normal respiratory   variation. Conclusions      Summary   Ejection fraction is visually estimated at 65%. No regional wall motion abnormalities seen. Normal right ventricle structure and function. Physiologic and/or trace mitral regurgitation is present. Cardiac Stress Test 5/27/2020:  1. She experienced chest pain (mid-sternal) following Lexiscan   injection  2. No new arrhythmias  3. STT changes at baseline with +ischemic changes following   Lexiscan injection  4. Nuclear images pending      Cardiac Cath Report 5/28/2020:  ANGIOGRAPHIC FINDINGS:  1. The left main coronary artery arises normally from the left sinus of  Valsalva. It is a good size vessel that bifurcates into left anterior  descending artery and left circumflex artery.   2.  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 GI/Hepatic/Renal:   (+) GERD: poorly controlled,          ROS comment: Hernia repair x2. Approximately 20 years ago  Hx Bladder repair x3. .   Endo/Other:    (+) hypothyroidism, blood dyscrasia (Discontinued Eliquis 5/28/2020): anticoagulation therapy:., electrolyte abnormalities ( Hypokalemic on admission), . Pt had no PAT visit       Abdominal:           Vascular:   + PVD, aortic or cerebral, PE ( Acute pulmonary embolism without acute cor pulmonale March 2020.   ). ROS comment: Carotid stenosis-  Left >90% stenosed, right 70-89% stenosed. Plans for further work up post CABG  Vascular following - asymptomatic recommend CABG. Anesthesia Plan      general     ASA 5       Induction: intravenous. arterial line, BIS, central line, CVP and ALLYN  MIPS: Postoperative opioids intended, Prophylactic antiemetics administered and Postoperative ventilation. Plan/risks discussed with: Pt intubated and sedated, blood band intact. Use of blood products discussed with patient whom consented to blood products. Plan discussed with CRNA and attending. Barb Torres DO,    6/4/2020      Pt seen, examined, chart reviewed, plan discussed.   Barb Torres  6/4/2020  7:38 AM

## 2020-06-04 NOTE — BRIEF OP NOTE
Brief Postoperative Note    Felix Garrett  YOB: 1945  [id number]    Pre-operative Diagnosis: Post cardiotomy cardiogenic shock    Post-operative Diagnosis: Same    Operation: Right axillary artery cutdown/Insertion of right axillary Impella CP LVAD/Removal of IABP    Anesthesia: General    Surgeon: Minerva/Miguelito    Assistants: Jae Baker    Estimated Blood Loss: 721     Complications: None    Specimens:   * No specimens in log *    Implants:  Implant Name Type Inv. Item Serial No.  Lot No. LRB No. Used Action   GRAFT VASC GEL WEAVE STR [id number] - [id number] Bone/Graft/Tissue/Human/Synth GRAFT VASC GEL WEAVE STR [id number] [facility] MEDICAL DEV [id number] Right 1 Implanted         Drains:   Chest Tube 1 Anterior Mediastinal 19 Turkmen (Active)   Suction -20 cm H2O 6/4/2020  8:00 AM   Chest Tube Airleak No 6/4/2020  8:00 AM   Drainage Description Serosanguinous 6/4/2020  8:00 AM   Dressing Status Clean;Dry; Intact 6/4/2020  8:00 AM   Dressing Type Dry dressing 6/4/2020  8:00 AM   Site Assessment Clean;Dry; Intact 6/4/2020  8:00 AM   Surrounding Skin Intact;Dry 6/4/2020  8:00 AM   Patency Intervention Tip/Tilt 6/4/2020  8:00 AM   Output (ml) 40 ml 6/4/2020  8:00 AM   Measurement at Chest Wall  0 6/3/2020  1:00 PM       Chest Tube 2 Mediastinal 19 Turkmen (Active)   Suction -20 cm H2O 6/4/2020  8:00 AM   Chest Tube Airleak No 6/4/2020  8:00 AM   Drainage Description Serosanguinous 6/4/2020  8:00 AM   Dressing Status Clean;Dry; Intact 6/4/2020  8:00 AM   Dressing Type Dry dressing 6/4/2020  8:00 AM   Site Assessment Clean;Dry; Intact 6/4/2020  8:00 AM   Surrounding Skin Dry; Intact 6/4/2020  8:00 AM   Patency Intervention Tip/Tilt 6/4/2020  8:00 AM       Chest Tube 3 Left Pleural 19 Turkmen (Active)   Suction -20 cm H2O 6/4/2020  8:00 AM   Chest Tube Airleak No 6/4/2020  8:00 AM   Drainage Description Serosanguinous 6/4/2020  8:00 AM   Dressing Status Clean;Dry; Intact 6/4/2020  8:00 AM   Dressing

## 2020-06-04 NOTE — ANESTHESIA PROCEDURE NOTES
Procedure Performed: ALLYN     Start Time:        End Time:      Preanesthesia Checklist:  Patient identified, IV assessed, risks and benefits discussed, monitors and equipment assessed, procedure being performed at surgeon's request and anesthesia consent obtained. General Procedure Information  Diagnostic Indications for Echo:  assessment of surgical repair, hemodynamic monitoring and assessment of valve function  Physician Requesting Echo: Snoya Ignacio MD  Location performed:  OR  Intubated  Bite block placed  Heart visualized  Probe Insertion:  Easy  Probe Type:  3D and mulitplane  Modalities:  3D, color flow mapping, pulse wave Doppler and continuous wave Doppler    Echocardiographic and Doppler Measurements    Ventricles    Right Ventricle:  Cavity size normal.  Hypertrophy not present. Thrombus not present. Global function mildly impaired. Left Ventricle:  Cavity size normal.  Hypertrophy not present. Thrombus not present. Global Function severely impaired. Ventricular Regional Function:    Wall Motion Comments:       Minimal LV wall motion. [place] is akinetic. Basal walls all hypokinetic EF ~ 10%    Valves    Aortic Valve: Annulus normal.  Stenosis not present. Regurgitation none. Leaflets normal.  Leaflet motions normal.      Mitral Valve: Annulus normal.  Stenosis not present. Regurgitation mild. Leaflets normal.  Leaflet motions normal.      Tricuspid Valve: Annulus normal.  Stenosis not present. Regurgitation mild. Leaflets normal.  Leaflet motions normal.    Pulmonic Valve: Annulus normal.  Stenosis not present. Regurgitation mild. Aorta    Ascending Aorta:  Size normal.  Dissection not present. Aortic Arch:  Size normal.  Dissection not present. Descending Aorta:  Size normal.  Dissection not present. Atria    Right Atrium:  Size normal.  Spontaneous echo contrast not present. Thrombus not present. Tumor not present. Device not present.       Left Atrium:  Size normal.  Spontaneous echo contrast not present. Thrombus not present. Tumor not present. Device not present. Left atrial appendage normal.      Septa    Atrial Septum:  Intra-atrial septal morphology normal.      Ventricular Septum:  Intra-ventricular septum morphology normal.          Other Findings  Pleural Effusion:  none  Pulmonary Arteries:  normal      Anesthesia Information  Performed Personally  Anesthesiologist:  Trudy Davies,       Echocardiogram Comments:       S/p impella placement. Inflow 3.57cm from AV annulus. Good inflow and outflow location. ECMO cannula remains at SVC/RA junction    RVEF mildly reduced.   LV EF remains severely reduced

## 2020-06-05 NOTE — PROGRESS NOTES
CVICU Progress Note    Name: Miri Fischer  MRN: [id number]    CC: Postoperative Critical Care Management     Indication for Surgery/Procedure: MVCAD  LVEF:  Preop- Normal, Postop ~30%   RVF:  Preop- Normal, Postop Moderate dysfunction      Important/Relevant PMH/PSH: HTN, HPL, Asthma, GERD, hypothyroidism, chronic back pain      Procedure/Surgeries: 6/2/2020 CABG x2 (LIMA-LAD, SVG-Diag), EVH, IABP insertion, VA ECMO insertion     6/3/2020 Insertion of left superficial femoral artery catheter. 6/4/2020: Right axillary artery cutdown. Insertion of right axillary Impella CP LVAD. Removal of intraaortic balloon pump.     Pacing wires: Ventricular       Intake/Output Summary (Last 24 hours) at 6/5/2020 0822  Last data filed at 6/5/2020 0602  Gross per 24 hour   Intake 7534.67 ml   Output 1205 ml   Net 6329.67 ml       Recent Labs     06/04/20  1135  06/04/20  1550  06/05/20  0222 06/05/20  0430 06/05/20  0607   WBC 13.8*  --  15.5*  --   --  17.4*  --    HGB 7.9*   < > 7.8*   < > 10.0* 9.9* 10.1*   HCT 24.1*   < > 23.4*   < > 29.9* 29.9* 29.4*   PLT 52*  --  129*  --   --  87*  --     < > = values in this interval not displayed. Lab Results   Component Value Date     06/05/2020    K 5.1 06/05/2020    K 3.9 05/26/2020    CL 97 06/05/2020    CO2 21 06/05/2020    BUN 18 06/05/2020    CREATININE 0.8 06/05/2020    GLUCOSE 258 06/05/2020    GLUCOSE 118 02/23/2011    CALCIUM 7.8 06/05/2020      Recent Labs     06/04/20  0410 06/04/20  1550 06/05/20  0430   BUN 15 16 18   CREATININE 1.0 0.9 0.8       Physical Exam:    /82   Pulse 68   Temp 98.2 °F (36.8 °C) (Core)   Resp 9   Ht 5' 1\" (1.549 m)   Wt 174 lb 8 oz (79.2 kg)   SpO2 95%   BMI 32.97 kg/m²       CXR Findings: 6/5/2020     FINDINGS:     Lines/tubes are unchanged. There is left lower lobe infiltrate   unchanged. There is some focal pleural-based density left mid   hemithorax laterally. This is unchanged. There is no left-sided   pneumothorax.     There is some new infiltrate in the right upper lobe. There has been sternotomy          - CXR personally viewed and interpreted by ICU Nurse Practitioner, agree with above findings     General: Critically ill, on VA ECMO and Impella CP support, Epinephrine and milrinone gtts for inotropic support, amiodarone and lidocaine gtts for ectopy/prevention, heparin gtt for AC, RHI gtt   Eyes: PERRL, +sceral and orbital edema   Pulmonary: Diminished. No wheezes, no accessory muscle use noted   Ventilator: Mode: AC 10, 60% FiO2, 7 PEEP, 400 Vt   Cardiovascular:  RRR, no heaves or thrills on palpation  Tele: SR   Abdomen: Softly distended, OG to LIWS   Extremities: BUE +radial signals, RLE +DP/PT signal, LLE absent DP/PT signal capillary refill sluggish but remains <3seconds, LLE taut with 4+ edema, 4+ peripheral edema with seeping   Neurologic/Psych: Sedation   Skin: Skin taut extremities and abdomen; Left foot discoloration, right hand discoloration   Incisions: MSI with JEANINE dressing intact, LLE SVG sites oozy- staples intact with ecchymosis, Left ECMO femoral sites C/D/I, Right femoral IABP site soft to palpation with hematoma improved; Right axillary Impella site oozy     Assessment/Plan: POD #3    1.  MVCAD S/p CABG x2 (LIMA-LAD, SVG-Diag)  - statin  - Continue to monitor chest tube output (MS 260cc/24 hours, Left pleural 220cc/24 hours)      2. Acute Postoperative Respiratory Failure  - 2/2 surgery, critical illness, h/o asthma   - Pulmonology following preop for dyspnea, recent PE-- recently started on daily prednisone; stress dosed intraop; continue stress dose steroids    - Maintain ETT with intubated on lung protective ventilation  - CXR with pulmonary edema, left infiltrate   - Sedation: Propofol and fentanyl infusions   - ABGs per protocol and PRN      3. Cardiogenic shock   - Preop normal EF/RV, post op LV 30%, RV moderate dysfunction following Vfib arrest in OR with CPR/defibrillation x7/lidocaine gtt - CK uptrending POD1 with LLE ischemic changes; s/p distal perfusion cannula insertion per Vascular surgery  - Started on Bicarb gtt 6/3 for renal protection   - CK 4162-->4090-->3907-->3778--> 3960-->3853-->4046; starting to uptrend   - Continue to trend CKs, monitor peripheral vascular assessment  - Vascular surgery following      10. Shock liver  - 2/2 critical illness, cardiac arrest s/p CPR   - - downtrending, ALT normalized, Tbili WNL, continue to trend      11. Hypothyroidism   - IV synthroid while NPO      12. Thrombocytopenia  - Consumptive, critical illness   - Platelets 90H s/p transfusion 6/2, 6/3, 6/4  - transfuse to maintain Plts >80K, continue to monitor     13. Fluid volume overload   - + 13L since surgery, severe peripheral edema, CXR with worsening pulmonary edema, hyponatremia  - Diurese when able     14. At risk for Malnutrition   - POD3, NPO since prior to surgery  - Start TF when able     15. Leukocytosis  - WBC 17.4, up trending; Tmax 99.3  - Empiric Cefepime 2g q 12       This patient has a high probability of sudden deterioration, which requires preparedness to urgently intervene. I participated in the decision making process and managed the direct care of the patient that required frequent assessment and treatment. I personally spent 68 minutes of critical care time treating the patient.          Dispo: CVICU    Electronically signed by JEANETTE Sanchez CNP on 6/5/2020 at 8:22 AM

## 2020-06-05 NOTE — PROGRESS NOTES
oozing  -LLE improving and has signals distally on exam  - continue supportive care    Electronically signed by Tanmay Cerda DO on 6/5/2020 at 6:39 AM    Pt seen and examined    Discussed with Dr. Sarina Blue with family re plan for possible removal of ecmo pendig echo     I reviewed the procedure with the patient and family as available. I discussed the procedure, risks, benefits, complications, and alternatives of the procedure. They understand and consent.   All questions were answered      Shila Smith

## 2020-06-05 NOTE — PROGRESS NOTES
Zbigniew George M.D.,[facility]  Jenifer Judd D.O., [email or web address]., Nancy Mackenzie M.D. Ricard Collet, M.D., Jarrod Cates M.D. Tha Carlisle D.O. Daily Pulmonary Progress Note    Patient:  Miri Fischer 77 y.o. female MRN: [id number]     Date of Service: 6/5/2020        Subjective      Patient was seen and examined. Disucssed with ICU staff at bedside. Patient had CABG. Had intra-op CPA. Developed severe MR. Was placed on balloon pump, ECMO, and pressors. Patient remains critical .     Balloon pump exchanged for Impella device. Plan on possible removal of ECMO today.   Patient also started on milrinone gtt and epo    Objective   Vitals: /78   Pulse 92   Temp 97.9 °F (36.6 °C) (Core)   Resp 10   Ht 5' 1\" (1.549 m)   Wt 174 lb 8 oz (79.2 kg)   SpO2 99%   BMI 32.97 kg/m²     I/O:    Intake/Output Summary (Last 24 hours) at 6/5/2020 1427  Last data filed at 6/5/2020 1408  Gross per 24 hour   Intake 5029.67 ml   Output 2220 ml   Net 2809.67 ml       CURRENT MEDS :  Scheduled Meds:   sterile water        sodium chloride  20 mL Intravenous Once    cefepime  2 g Intravenous Q12H    And    sodium chloride   Intravenous Q12H    sodium chloride  20 mL Intravenous Once    [START ON 6/10/2020] levothyroxine  12.5 mcg Intravenous Daily    sodium chloride flush  10 mL Intravenous 2 times per day    sennosides-docusate sodium  1 tablet Oral BID    chlorhexidine  15 mL Mouth/Throat BID    magnesium oxide  400 mg Oral Daily    mupirocin   Nasal BID    ipratropium-albuterol  1 ampule Inhalation Q4H WA    hydrocortisone sodium succinate PF  100 mg Intravenous Q8H    pantoprazole  40 mg Intravenous Daily    And    sodium chloride (PF)  10 mL Intravenous Daily    budesonide  0.5 mg Nebulization BID    And    Arformoterol Tartrate  15 mcg Nebulization BID    montelukast  10 mg Oral Nightly       Continuous Infusions:   epoprostenol (VELETRI) nebulization solution 50 ng/kg/min (06/05/20 0800)    IV infusion builder Stopped (06/05/20 1220)    amiodarone 450mg/250ml D5W infusion 0.25 mg/min (06/05/20 0850)    sodium bicarbonate infusion 50 mL/hr at 06/05/20 0758    sodium chloride 30 mL/hr (06/05/20 0800)    propofol 50 mcg/kg/min (06/05/20 1246)    sodium chloride      norepinephrine      insulin 4.89 Units/hr (06/05/20 1024)    dextrose      nitroGLYCERIN      EPINEPHrine infusion Stopped (06/05/20 1424)    vasopressin (Septic Shock) infusion Stopped (06/05/20 1330)    milrinone 0.5 mcg/kg/min (06/05/20 0800)    fentaNYL 5 mcg/ml in 0.9%  ml infusion 50 mcg/hr (06/05/20 0800)    heparin (porcine) 9 Units/kg/hr (06/05/20 1130)       PRN Meds:  heparin irrigation, sodium chloride flush, potassium chloride, magnesium sulfate, acetaminophen, acetaminophen, oxyCODONE **OR** oxyCODONE, fentanNYL **OR** fentanNYL, magnesium hydroxide, ondansetron, propofol, sodium chloride, norepinephrine, insulin, glucose, dextrose, glucagon (rDNA), dextrose, nitroGLYCERIN, aluminum & magnesium hydroxide-simethicone, calcium gluconate IVPB      Physical Exam:  Physical Exam  Constitutional:       Appearance: She is ill-appearing. Interventions: She is sedated and intubated. HENT:      Head: Normocephalic and atraumatic. Eyes:      Conjunctiva/sclera: Conjunctivae normal.   Neck:      Musculoskeletal: Neck supple. Trachea: No tracheal deviation. Cardiovascular:      Rate and Rhythm: Normal rate and regular rhythm. Heart sounds: Normal heart sounds. Comments: Femoral catheters  Pulmonary:      Effort: Pulmonary effort is normal. She is intubated. Breath sounds: Normal breath sounds. Comments: +chest tubes in place  Abdominal:      General: Bowel sounds are normal.      Palpations: Abdomen is soft. Tenderness: There is no abdominal tenderness. Lymphadenopathy:      Cervical: No cervical adenopathy. Skin:     General: Skin is warm and dry.

## 2020-06-05 NOTE — OP NOTE
dressing 6/5/2020 12:00 PM   Dressing Change Due 06/06/20 6/5/2020 12:00 PM   Site Assessment Intact 6/5/2020 12:00 PM   Surrounding Skin Unable to view 6/5/2020 12:00 PM   Patency Intervention Stripped 6/5/2020  8:00 AM   Output (ml) 0 ml 6/5/2020 10:00 AM       Closed/Suction Drain Left;Proximal;Ventral Thigh (Active)       NG/OG/NJ/NE Tube Right mouth (Active)   Surrounding Skin Dry; Intact 6/4/2020  8:00 PM   Securement device Yes 6/4/2020  8:00 PM   Status Suction-low intermittent 6/5/2020  6:00 AM   Placement Verified by X-Ray (repeat) 6/4/2020  8:00 PM   NG/OG/NJ/NE External Measurement (cm) 55 cm 6/4/2020  8:00 PM   Drainage Appearance Green 6/5/2020  6:00 AM   Output (mL) 100 ml 6/5/2020  6:00 AM       Urethral Catheter Temperature probe;Non-latex 16 fr (Active)   Catheter Indications Need for fluid management in critically ill patients in a critical care setting not able to be managed by other means such as BSC with hat, bedpan, urinal, condom catheter, or short term intermittent urethral catherization 6/5/2020 12:00 PM   Site Assessment No urethral drainage 6/5/2020 12:00 PM   Urine Color Yellow 6/5/2020 12:00 PM   Urine Appearance Clear 6/5/2020 12:00 PM   Output (mL) 30 mL 6/5/2020 12:00 PM       [REMOVED] Chest Tube 1 Anterior Pericardial 19 Spanish (Removed)       [REMOVED] NG/OG/NJ/NE Tube Orogastric (Removed)   Surrounding Skin Dry; Intact 6/3/2020 12:30 PM   Securement device Yes 6/3/2020 12:30 PM   Status Clamped 6/3/2020 12:30 PM   Placement Verified by X-Ray (repeat) 6/3/2020 12:30 PM   NG/OG/NJ/NE External Measurement (cm) 55 cm 6/3/2020 12:30 PM   Drainage Appearance Clear 6/3/2020 12:30 PM   Free Water Flush (mL) 50 mL 6/3/2020 10:00 AM   Output (mL) 0 ml 6/3/2020  1:00 PM       Findings: Small left sfa but patent, chronic left popliteal artery occlusion, Left AT recon with flow to the foot    DESCRIPTION OF PROCEDURE: The patient was identified and the procedure was confirmed.  The Left leg was

## 2020-06-05 NOTE — ANESTHESIA PRE PROCEDURE
Department of Anesthesiology  Preprocedure Note       Name:  Carisa Colon   Age:  77 y.o.  :  1945                                          MRN:  [id number]         Date:  2020      Surgeon: Bree Contreras):  MD Salvador Frausto DO    Procedure: Procedure(s):  CABG CORONARY ARTERY BYPASS, ALLYN    Medications prior to admission:   Prior to Admission medications    Medication Sig Start Date End Date Taking?  Authorizing Provider   albuterol sulfate HFA (PROVENTIL HFA) 108 (90 Base) MCG/ACT inhaler Inhale 2 puffs into the lungs every 6 hours as needed for Wheezing or Shortness of Breath 20  Yes Viktor Becker DO   hydrALAZINE (APRESOLINE) 100 MG tablet Take 1 tablet by mouth 3 times daily 20 Yes Annalee Morel DO   guaiFENesin (MUCINEX) 600 MG extended release tablet Take 2 tablets by mouth 2 times daily as needed for Congestion 20 Yes Mirna Del Valle DO   levocetirizine (XYZAL) 5 MG tablet Take 5 mg by mouth nightly   Yes Historical Provider, MD   budesonide-formoterol (SYMBICORT) 80-4.5 MCG/ACT AERO Inhale 2 puffs into the lungs 2 times daily 20  Yes Florentino Rhodes MD   pravastatin (PRAVACHOL) 20 MG tablet Take 1 tablet by mouth nightly 3/30/20  Yes Annalee Morel DO   gabapentin (NEURONTIN) 100 MG capsule TAKE 1 CAPSULE BY MOUTH 2 TIMES DAILY FOR 30 DAYS 20  Yes Historical Provider, MD   montelukast (SINGULAIR) 10 MG tablet Take 10 mg by mouth nightly   Yes Historical Provider, MD   amLODIPine (NORVASC) 5 MG tablet Take 1 tablet by mouth nightly 3/20/20 6/18/20 Yes Annalee Morel DO   levothyroxine (SYNTHROID) 25 MCG tablet Take 1 tablet by mouth daily 3/16/20  Yes Annalee Morel DO   omeprazole (PRILOSEC) 40 MG delayed release capsule Take 40 mg by mouth 2 times daily  3/26/19  Yes Historical Provider, MD   guaiFENesin (MUCINEX) 600 MG extended release tablet Take 2 tablets by mouth 2 times daily 20   Annalee Buccino, DO   hydrALAZINE 06/05/20 1446      sodium chloride flush 0.9 % injection 10 mL  10 mL Intravenous 2 times per day Katelyn JEANETTE Heredia - CNP   10 mL at 06/04/20 2210    sodium chloride flush 0.9 % injection 10 mL  10 mL Intravenous PRN JEANETTE Agarwal - CNP        potassium chloride 20 mEq/50 mL IVPB (Central Line)  20 mEq Intravenous PRN JEANETTE Agarwal - CNP   Stopped at 06/03/20 8909    magnesium sulfate 2 g in 50 mL IVPB premix  2 g Intravenous PRN Mariana Heredia APRN - BRET        acetaminophen (TYLENOL) tablet 650 mg  650 mg Oral Q4H PRN Katelyn Yan, JEANETTE - BRET        acetaminophen (TYLENOL) suppository 650 mg  650 mg Rectal Q4H PRN Mariana Heredia, JEANETTE - CNP        oxyCODONE (ROXICODONE) immediate release tablet 5 mg  5 mg Oral Q4H PRN JEANETTE Agarwal CNP        Or    oxyCODONE (ROXICODONE) immediate release tablet 10 mg  10 mg Oral Q4H PRN Mariana Heredia APRN - CNP        fentaNYL (SUBLIMAZE) injection 25 mcg  25 mcg Intravenous Q1H PRN JEANETTE Agarwal - CNP        Or    fentaNYL (SUBLIMAZE) injection 50 mcg  50 mcg Intravenous Q1H PRN JEANETTE Agarwal - CNP        sennosides-docusate sodium (SENOKOT-S) 8.6-50 MG tablet 1 tablet  1 tablet Oral BID JEANETTE Agarwal CNP   1 tablet at 06/04/20 2237    magnesium hydroxide (MILK OF MAGNESIA) 400 MG/5ML suspension 30 mL  30 mL Oral Daily PRN Mariana Heredia APRN - BRET        ondansetron (ZOFRAN) injection 4 mg  4 mg Intravenous Q8H PRN JEANETTE Agarwal - CNP        chlorhexidine (PERIDEX) 0.12 % solution 15 mL  15 mL Mouth/Throat BID Mariana Heredia APRN - CNP   15 mL at 06/05/20 0954    magnesium oxide (MAG-OX) tablet 400 mg  400 mg Oral Daily Katelyn JEANETTE Heredia - CNP   400 mg at 06/04/20 0735    mupirocin (BACTROBAN) 2 % ointment   Nasal BID JEANETTE Agarwal CNP        propofol injection  10 mcg/kg/min Intravenous Continuous PRN JEANETTE Agarwal - CNP 17.5 mL/hr at 06/05/20 1246 50 mcg/kg/min at 06/05/20 1246 CNP 10 mL/hr at 06/05/20 0800 50 mcg/hr at 06/05/20 0800    heparin 25,000 units in dextrose 5% 250 mL infusion  10 Units/kg/hr Intravenous Continuous Sofie Juan Luis, APRN - CNP 5.3 mL/hr at 06/05/20 1130 9 Units/kg/hr at 06/05/20 1130    hydrocortisone sodium succinate PF (SOLU-CORTEF) injection 100 mg  100 mg Intravenous Q8H Sofie Juan Luis, APRN - CNP   100 mg at 06/05/20 0526    pantoprazole (PROTONIX) injection 40 mg  40 mg Intravenous Daily Sofie Juan Luis, APRN - CNP   40 mg at 06/05/20 0954    And    sodium chloride (PF) 0.9 % injection 10 mL  10 mL Intravenous Daily Sofie Juan Luis, APRN - CNP   10 mL at 06/05/20 0955    budesonide (PULMICORT) nebulizer suspension 500 mcg  0.5 mg Nebulization BID Jessy Rufus, APRN - CNP   500 mcg at 06/05/20 7007    And    Arformoterol Tartrate (BROVANA) nebulizer solution 15 mcg  15 mcg Nebulization BID Jessy Rufus, APRN - CNP   15 mcg at 06/05/20 0950    montelukast (SINGULAIR) tablet 10 mg  10 mg Oral Nightly Jessy Rufus, APRN - CNP   10 mg at 06/04/20 2209     Facility-Administered Medications Ordered in Other Encounters   Medication Dose Route Frequency Provider Last Rate Last Dose    vecuronium (NORCURON) injection    PRN Lavon Rad, APRN - CRNA   10 mg at 06/05/20 1245    ceFAZolin (ANCEF) injection   Intravenous PRN Lavon Rad, APRN - CRNA   2,000 mg at 06/05/20 1248    fentaNYL (SUBLIMAZE) injection    PRN Macy De Souza RN   50 mcg at 06/05/20 1322    EPINEPHrine PF 1 MG/ML injection    PRN Macy De Souza RN   10 mcg at 06/05/20 1345       Allergies:     Allergies   Allergen Reactions    Medrol [Methylprednisolone] Itching     Severe Itching all over     Biaxin [Clarithromycin] Nausea Only    Pcn [Penicillins]      UNKNOWN REACTION AS A CHILD       Problem List:    Patient Active Problem List   Diagnosis Code    Acid reflux K21.9    Mild intermittent asthma without complication F86.51    Seasonal allergic rhinitis due to pollen 05/26/2020  1:07 AM [facility]   P-R Interval 150  ms Final 05/26/2020  1:07 AM [facility]   QRS Duration 88  ms Final 05/26/2020  1:07 AM [facility]   Q-T Interval 382  ms Final 05/26/2020  1:07 AM [facility]   QTc Calculation (Bazett) 451  ms Final 05/26/2020  1:07 AM [facility]   P [place] 65  degrees Final 05/26/2020  1:07 AM [facility]   R [place] 10  degrees Final 05/26/2020  1:07 AM [facility]   T [place] 23  degrees Final 05/26/2020  1:07 AM [facility]   Testing Performed By     Lab - Abbreviation Name Director Address Valid Date Range   [facility] MUSE Unknown Unknown 04/18/16 0721-Present   Narrative & Impression     Normal sinus rhythm  Nonspecific ST and T wave abnormality       Echocardiogram 4/29/2020:   Findings    Left Ventricle   Ejection fraction is visually estimated at 65%. No regional wall motion   abnormalities seen. Normal left ventricular wall thickness. Left ventricle   size is normal. Normal left ventricular diastolic filling pattern for age. Right Ventricle   Normal right ventricle structure and function. Left Atrium   Left atrial volume index of 22 ml per meters squared BSA. Right Atrium   Normal right atrium. Mitral Valve   Structurally normal mitral valve. No evidence of mitral valve stenosis. Physiologic and/or trace mitral regurgitation is present. Tricuspid Valve   The tricuspid valve appears structurally normal.   Mild tricuspid regurgitation. RVSP is 33 mmHg. Aortic Valve   The aortic valve is trileaflet. No hemodynamically significant aortic   stenosis is present. No evidence of aortic valve regurgitation. Pulmonic Valve   The pulmonic valve was not well visualized. Pericardial Effusion   No evidence for hemodynamically significant pericardial effusion. Aorta   Normal aortic root and ascending aorta. Miscellaneous   The inferior vena cava diameter is normal with normal respiratory   variation.       Conclusions      Summary   Ejection fraction is No evidence for subclavian steal.             Anesthesia Evaluation  Patient summary reviewed and Nursing notes reviewed no history of anesthetic complications:   Airway:        Comment: Pt intubated and sedated   Dental:      Comment: Pt intubated and sedated    Pulmonary:   (+) shortness of breath: new,  asthma (Symbicort daily): allergic asthma, seasonal asthma,                            Cardiovascular:  Exercise tolerance: poor (<4 METS),   (+) hypertension:, valvular problems/murmurs ( Nonrheumatic tricuspid valve regurgitation):, CAD:, ANG: after ambulating 1 flight of stairs, hyperlipidemia      ECG reviewed      Echocardiogram reviewed  Stress test reviewed       Beta Blocker:  Not on Beta Blocker         Neuro/Psych:                ROS comment: Chronic back pain- lumbar surgery 2016  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 GI/Hepatic/Renal:   (+) GERD: poorly controlled,          ROS comment: Hernia repair x2. Approximately 20 years ago  Hx Bladder repair x3. .   Endo/Other:    (+) hypothyroidism, blood dyscrasia (Discontinued Eliquis 5/28/2020): anticoagulation therapy:., electrolyte abnormalities ( Hypokalemic on admission), . Pt had no PAT visit       Abdominal:           Vascular:   + PVD, aortic or cerebral, PE ( Acute pulmonary embolism without acute cor pulmonale March 2020.   ). ROS comment: Carotid stenosis-  Left >90% stenosed, right 70-89% stenosed. Plans for further work up post CABG  Vascular following - asymptomatic recommend CABG. Anesthesia Plan      general     ASA 4       Induction: intravenous. arterial line, BIS, central line, CVP and ALLYN  MIPS: Postoperative opioids intended, Prophylactic antiemetics administered and Postoperative ventilation. Plan/risks discussed with: Pt intubated and sedated, blood band intact. Use of blood products discussed with patient whom consented to blood products.    Plan discussed with CRNA and attending. Alexis Munoz DO,    2020      Pt seen, examined, chart reviewed, plan discussed. Alexis Munoz  2020  2:52 PM  Department of Anesthesiology  Preprocedure Note       Name:  Jacquelin Leroy   Age:  77 y.o.  :  1945                                          MRN:  [id number]         Date:  2020      Surgeon: Renee Arguelles):  MD Neftali Serna DO    Procedure: Procedure(s):  CABG CORONARY ARTERY BYPASS, ALLYN    Medications prior to admission:   Prior to Admission medications    Medication Sig Start Date End Date Taking?  Authorizing Provider   albuterol sulfate HFA (PROVENTIL HFA) 108 (90 Base) MCG/ACT inhaler Inhale 2 puffs into the lungs every 6 hours as needed for Wheezing or Shortness of Breath 20  Yes Viktor Becker DO   hydrALAZINE (APRESOLINE) 100 MG tablet Take 1 tablet by mouth 3 times daily 20 Yes Annalee Morel DO   guaiFENesin (MUCINEX) 600 MG extended release tablet Take 2 tablets by mouth 2 times daily as needed for Congestion 20 Yes Neftaly Salazar DO   levocetirizine (XYZAL) 5 MG tablet Take 5 mg by mouth nightly   Yes Historical Provider, MD   budesonide-formoterol (SYMBICORT) 80-4.5 MCG/ACT AERO Inhale 2 puffs into the lungs 2 times daily 20  Yes Sherron Andrade MD   pravastatin (PRAVACHOL) 20 MG tablet Take 1 tablet by mouth nightly 3/30/20  Yes Annalee Morel DO   gabapentin (NEURONTIN) 100 MG capsule TAKE 1 CAPSULE BY MOUTH 2 TIMES DAILY FOR 30 DAYS 20  Yes Historical Provider, MD   montelukast (SINGULAIR) 10 MG tablet Take 10 mg by mouth nightly   Yes Historical Provider, MD   amLODIPine (NORVASC) 5 MG tablet Take 1 tablet by mouth nightly 3/20/20 6/18/20 Yes Annalee Morel DO   levothyroxine (SYNTHROID) 25 MCG tablet Take 1 tablet by mouth daily 3/16/20  Yes Annalee Morel DO   omeprazole (PRILOSEC) 40 MG delayed release capsule Take 40 mg by mouth 2 times daily 3/26/19  Yes Historical Provider, MD   guaiFENesin (MUCINEX) 600 MG extended release tablet Take 2 tablets by mouth 2 times daily 5/20/20   Annalee Morel DO   hydrALAZINE (APRESOLINE) 50 MG tablet Take 1 tablet by mouth every 8 hours  Patient not taking: Reported on 5/26/2020 4/29/20   Annalee Morel DO   meloxicam (MOBIC) 7.5 MG tablet Take 7.5 mg by mouth daily    Historical Provider, MD   Probiotic Product (PROBIOTIC PO) Take 1 capsule by mouth daily    Historical Provider, MD       Current medications:    Current Facility-Administered Medications   Medication Dose Route Frequency Provider Last Rate Last Dose    sterile water injection             sodium chloride 0.9 % 500 mL with heparin (porcine) 5,000 Units    PRN Jonelle Griffin MD        0.9 % sodium chloride bolus  20 mL Intravenous Once JEANETTE Henry CNP        epoprostenol 1,500 mcg in sodium chloride 0.9 % 50 mL nebulization solution  50 ng/kg/min (Ideal) Nebulization Continuous JEANETTE Suresh - CNP 4.8 mL/hr at 06/05/20 0800 50 ng/kg/min at 06/05/20 0800    heparin (porcine) 12,500 Units in dextrose 5 % 500 mL infusion   Intravenous Continuous Yuli Chan APRN - CNP   Stopped at 06/05/20 1220    cefepime (MAXIPIME) 2 g IVPB extended (mini-bag)  2 g Intravenous Q12H JEANETTE Henry - CNP   Stopped at 06/05/20 0602    And    0.9 % sodium chloride infusion admixture   Intravenous Q12H JEANETTE Henry - CNP   Stopped at 06/05/20 0802    0.9 % sodium chloride bolus  20 mL Intravenous Once JEANETTE Kruger - BRET        amiodarone (CORDARONE) 450 mg in dextrose 5 % 250 mL infusion  0.25 mg/min Intravenous Continuous JEANETTE Suresh - CNP 8.3 mL/hr at 06/05/20 0850 0.25 mg/min at 06/05/20 0850    [START ON 6/10/2020] levothyroxine (SYNTHROID) injection 12.5 mcg  12.5 mcg Intravenous Daily JEANETTE Suresh CNP        sodium bicarbonate 50 mEq in dextrose 5 % 1,000 mL infusion   Intravenous Continuous Nithya NGUYỄN DelacruzN - CNP 50 mL/hr at 06/05/20 0758      0.9 % sodium chloride infusion  30 mL/hr Intravenous Continuous Veronica Cruz APRN -  mL/hr at 06/05/20 1446      sodium chloride flush 0.9 % injection 10 mL  10 mL Intravenous 2 times per day Lorita Nancy APRN - CNP   10 mL at 06/04/20 2210    sodium chloride flush 0.9 % injection 10 mL  10 mL Intravenous PRN Veronica Cruz, APRN - CNP        potassium chloride 20 mEq/50 mL IVPB (Central Line)  20 mEq Intravenous PRN Lorita Nancy, APRN - CNP   Stopped at 06/03/20 5739    magnesium sulfate 2 g in 50 mL IVPB premix  2 g Intravenous PRN Veronica Cruz, APRN - CNP        acetaminophen (TYLENOL) tablet 650 mg  650 mg Oral Q4H PRN Lorita Nancy, APRN - CNP        acetaminophen (TYLENOL) suppository 650 mg  650 mg Rectal Q4H PRN Veronica Cruz, APRN - CNP        oxyCODONE (ROXICODONE) immediate release tablet 5 mg  5 mg Oral Q4H PRN Lorsherri Cruz, APRN - CNP        Or    oxyCODONE (ROXICODONE) immediate release tablet 10 mg  10 mg Oral Q4H PRN Lorsherri Cruz, APRN - CNP        fentaNYL (SUBLIMAZE) injection 25 mcg  25 mcg Intravenous Q1H PRN Lorita Nancy, APRN - CNP        Or    fentaNYL (SUBLIMAZE) injection 50 mcg  50 mcg Intravenous Q1H PRN Lorita Nancy, APRN - CNP        sennosides-docusate sodium (SENOKOT-S) 8.6-50 MG tablet 1 tablet  1 tablet Oral BID Lorsherri Cruz APRN - CNP   1 tablet at 06/04/20 2237    magnesium hydroxide (MILK OF MAGNESIA) 400 MG/5ML suspension 30 mL  30 mL Oral Daily PRN Lorita Nancy APRN - CNP        ondansetron (ZOFRAN) injection 4 mg  4 mg Intravenous Q8H PRN Lorsherri Cruz, APRN - CNP        chlorhexidine (PERIDEX) 0.12 % solution 15 mL  15 mL Mouth/Throat BID Lorita Nancy APRN - CNP   15 mL at 06/05/20 0954    magnesium oxide (MAG-OX) tablet 400 mg  400 mg Oral Daily Lorsherri Cruz APRN - CNP   400 mg at 06/04/20 0735    mupirocin (BACTROBAN) 2 % ointment   Nasal BID Alvin Milligan JEANETTE Gongora CNP        propofol injection  10 mcg/kg/min Intravenous Continuous PRN JEANETTE Parson CNP 17.5 mL/hr at 06/05/20 1246 50 mcg/kg/min at 06/05/20 1246    ipratropium-albuterol (DUONEB) nebulizer solution 1 ampule  1 ampule Inhalation Q4H WA JEANETTE Parson CNP   Stopped at 06/05/20 1329    0.9 % sodium chloride bolus  250 mL Intravenous Continuous PRN JEANETTE Parson CNP        norepinephrine (LEVOPHED) 16 mg in dextrose 5% 250 mL infusion  2 mcg/min Intravenous Continuous PRN JEANETTE Parson CNP        insulin regular (HUMULIN R;NOVOLIN R) 100 Units in sodium chloride 0.9 % 100 mL infusion  1 Units/hr Intravenous Continuous PRN JEANETTE Parson CNP 4.9 mL/hr at 06/05/20 1024 4.89 Units/hr at 06/05/20 1024    glucose (GLUTOSE) 40 % oral gel 15 g  15 g Oral PRN JEANETTE Parson CNP        dextrose 50 % IV solution  12.5 g Intravenous PRN JEANETTE Parson CNP        glucagon (rDNA) injection 1 mg  1 mg Intramuscular PRN JEANETTE Parson CNP        dextrose 5 % solution  100 mL/hr Intravenous PRN JEANETTE Parson CNP        nitroGLYCERIN 50 mg in dextrose 5% 250 mL infusion  10 mcg/min Intravenous Continuous PRN JEANETTE Parson CNP        aluminum & magnesium hydroxide-simethicone (MAALOX) 200-200-20 MG/5ML suspension 30 mL  30 mL Per NG tube Q4H PRN JEANETTE Parson CNP        calcium gluconate 1 g in dextrose 5 % 100 mL IVPB  1 g Intravenous PRN JEANETTE Parson CNP   Stopped at 06/04/20 2345    EPINEPHrine (EPINEPHrine HCL) 5 mg in dextrose 5 % 250 mL infusion  1 mcg/min Intravenous Continuous Sofie Juan LuisJEANETTE vásquez CNP   Stopped at 06/05/20 1424    vasopressin 20 Units in dextrose 5 % 100 mL infusion  0.02 Units/min Intravenous Continuous Sofie Juan Luis, APRN - CNP   Stopped at 06/05/20 1330    milrinone (PRIMACOR) 20 mg in dextrose 5 % 100 mL infusion  0.5 mcg/kg/min Intravenous Continuous Sofie Juan Luis, APRN - CNP 8.8 mL/hr at 06/05/20 0800 0.5 mcg/kg/min at 06/05/20 0800    fentaNYL 5 mcg/ml in 0.9%  ml infusion  25 mcg/hr Intravenous Continuous Sofie Juan Luis, APRN - CNP 10 mL/hr at 06/05/20 0800 50 mcg/hr at 06/05/20 0800    heparin 25,000 units in dextrose 5% 250 mL infusion  10 Units/kg/hr Intravenous Continuous Sofie Juan Luis, APRN - CNP 5.3 mL/hr at 06/05/20 1130 9 Units/kg/hr at 06/05/20 1130    hydrocortisone sodium succinate PF (SOLU-CORTEF) injection 100 mg  100 mg Intravenous Q8H Sofie Juan Luis, APRN - CNP   100 mg at 06/05/20 0526    pantoprazole (PROTONIX) injection 40 mg  40 mg Intravenous Daily Sofie Juan Luis, APRN - CNP   40 mg at 06/05/20 0954    And    sodium chloride (PF) 0.9 % injection 10 mL  10 mL Intravenous Daily Sofie Juan Luis, APRN - CNP   10 mL at 06/05/20 0955    budesonide (PULMICORT) nebulizer suspension 500 mcg  0.5 mg Nebulization BID Orene Sleight, APRN - CNP   500 mcg at 06/05/20 7812    And    Arformoterol Tartrate (BROVANA) nebulizer solution 15 mcg  15 mcg Nebulization BID Orene Sleight, APRN - CNP   15 mcg at 06/05/20 0950    montelukast (SINGULAIR) tablet 10 mg  10 mg Oral Nightly Orene Sleight, APRN - CNP   10 mg at 06/04/20 2209     Facility-Administered Medications Ordered in Other Encounters   Medication Dose Route Frequency Provider Last Rate Last Dose    vecuronium (NORCURON) injection    PRN Deatra Roulette, APRN - CRNA   10 mg at 06/05/20 1245    ceFAZolin (ANCEF) injection   Intravenous PRN Deatra Roulette, APRN - CRNA   2,000 mg at 06/05/20 1248    fentaNYL (SUBLIMAZE) injection    PRN Cat Villareal RN   50 mcg at 06/05/20 1322    EPINEPHrine PF 1 MG/ML injection    PRN Cat Villareal RN   10 mcg at 06/05/20 1345       Allergies:     Allergies   Allergen Reactions    Medrol [Methylprednisolone] Itching     Severe Itching all over     Biaxin [Clarithromycin] Nausea Only    Pcn [Penicillins]      UNKNOWN REACTION AS A CHILD       Problem Lab Results   Component Value Date    COVID19 Not Detected 05/20/2020         EKG 5/26/2020:  Ventricular Rate 84  BPM Final 05/26/2020  1:07 AM [facility]   Atrial Rate 84  BPM Final 05/26/2020  1:07 AM [facility]   P-R Interval 150  ms Final 05/26/2020  1:07 AM [facility]   QRS Duration 88  ms Final 05/26/2020  1:07 AM [facility]   Q-T Interval 382  ms Final 05/26/2020  1:07 AM [facility]   QTc Calculation (Bazett) 451  ms Final 05/26/2020  1:07 AM [facility]   P [place] 65  degrees Final 05/26/2020  1:07 AM [facility]   R [place] 10  degrees Final 05/26/2020  1:07 AM [facility]   T [place] 23  degrees Final 05/26/2020  1:07 AM [facility]   Testing Performed By     Lab - Abbreviation Name Director Address Valid Date Range   [facility] MUSE Unknown Unknown 04/18/16 0721-Present   Narrative & Impression     Normal sinus rhythm  Nonspecific ST and T wave abnormality       Echocardiogram 4/29/2020:   Findings    Left Ventricle   Ejection fraction is visually estimated at 65%. No regional wall motion   abnormalities seen. Normal left ventricular wall thickness. Left ventricle   size is normal. Normal left ventricular diastolic filling pattern for age. Right Ventricle   Normal right ventricle structure and function. Left Atrium   Left atrial volume index of 22 ml per meters squared BSA. Right Atrium   Normal right atrium. Mitral Valve   Structurally normal mitral valve. No evidence of mitral valve stenosis. Physiologic and/or trace mitral regurgitation is present. Tricuspid Valve   The tricuspid valve appears structurally normal.   Mild tricuspid regurgitation. RVSP is 33 mmHg. Aortic Valve   The aortic valve is trileaflet. No hemodynamically significant aortic   stenosis is present. No evidence of aortic valve regurgitation. Pulmonic Valve   The pulmonic valve was not well visualized. Pericardial Effusion   No evidence for hemodynamically significant pericardial effusion.       Aorta   Normal aortic root and ascending aorta. Miscellaneous   The inferior vena cava diameter is normal with normal respiratory   variation. Conclusions      Summary   Ejection fraction is visually estimated at 65%. No regional wall motion abnormalities seen. Normal right ventricle structure and function. Physiologic and/or trace mitral regurgitation is present. Cardiac Stress Test 5/27/2020:  1. She experienced chest pain (mid-sternal) following Lexiscan   injection  2. No new arrhythmias  3. STT changes at baseline with +ischemic changes following   Lexiscan injection  4. Nuclear images pending      Cardiac Cath Report 5/28/2020:  ANGIOGRAPHIC FINDINGS:  1. The left main coronary artery arises normally from the left sinus of  Valsalva. It is a good size vessel that bifurcates into left anterior  descending artery and left circumflex artery. 2.  The left anterior descending artery is a mid size vessel, extends  down to the apex. It gives off a mid size diagonal branch. The LAD has  80% stenosis just distal to the first diagonal and the first diagonal  branch has 80% disease in the mid segment. 3.  The left circumflex artery is a large vessel that is tortuous  without any disease. 4.  The ramus intermedius artery also is a large vessel that is  subdivided distally and it is tortuous without any disease. There was  grade 3 left-to-right collaterals to three branches where two of them  are large and one of them was small one.  5.  The right coronary artery has anterior takeoff. It is a mid size  vessel that has total occlusion in the mid segment.   IMPRESSION:  1. Significant disease involving mid LAD. 2.  Significant disease involving mid size first diagonal branch. 3.  Totally occluded RCA with grade 3 left-to-right collaterals. US Carotid Artery Bilateral 5/29/2020:    Impression       Based on peak systolic ratios, stenosis of 50-69% appears present   within the internal carotid arteries.

## 2020-06-05 NOTE — OP NOTE
laid  in supine position and induced under general endotracheal anesthesia by  Anesthesia staff. This was via the already present endotracheal tube. The patient's chest and abdomen were prepped and draped in usual sterile  fashion. A right subclavian incision was performed and dissection  carried down through the subcutaneous and deep cutaneous tissue using  Bovie electrocautery. Pectoralis major was divided along its fibers and  the pectoralis minor was transected. We identified the axillary artery  and was encircled with vessel loops. The patient was heparinized and  the artery was clamped distally and proximally. An arteriotomy was  performed. An 8-mm Gelweave graft was sewn in an end-to-side fashion  after we beveled the graft using 5-0 Prolene. We tested the anastomosis  for hemostasis and it was noted. Evicel was sprayed overlying the  anastomosis. We then using the axillary access chips _____ into the  left ventricular cavity. This was exchanged over a glide catheter for  the 0.018 Impella insertion wire. Over this wire, the Impella CP was  advanced _____ cavity under fluoroscopic and echo guidance into a good  position. The wire was removed and the Impella was started with good  flows. We secured the Impella to the graft and so the sheath to the  skin. The fascia was closed with 2-0 Vicryl in a running layer with  skin clips overlying and a silver dressing overlying this. Next, the  sutures up to intraaortic balloon pump were cut and intraaortic balloon  pump was removed and pressure was held for 30 minutes with good  hemostasis. The patient was transferred back to cardiothoracic  intensive care unit in critical condition. All sponge, instrument, and  needle counts were correct at the end of the case.         David Meraz MD    D: 06/04/2020 13:00:56       T: 06/04/2020 14:22:14     [email or web address]  Job#: [id number]     Doc#: [id number]    CC:  DO Annalee Guevara DO

## 2020-06-05 NOTE — PROGRESS NOTES
PROGRESS NOTE     CARDIOLOGY    Chief complaint: Seen today for follow up, management & recommendations for CAD    She is intubated, sedated, on balloon pump, on ECMO. Wt Readings from Last 3 Encounters:   06/05/20 174 lb 8 oz (79.2 kg)   05/23/20 136 lb (61.7 kg)   05/22/20 136 lb 11.2 oz (62 kg)     Temp Readings from Last 3 Encounters:   06/05/20 97.9 °F (36.6 °C) (Core)   06/02/20 (!) 89.1 °F (31.7 °C)   05/23/20 99 °F (37.2 °C) (Oral)     BP Readings from Last 3 Encounters:   06/05/20 110/78   06/02/20 (!) 139/59   05/23/20 (!) 153/82     Pulse Readings from Last 3 Encounters:   06/05/20 92   05/23/20 91   05/20/20 95         Intake/Output Summary (Last 24 hours) at 6/5/2020 1305  Last data filed at 6/5/2020 1200  Gross per 24 hour   Intake 5379.67 ml   Output 1190 ml   Net 4189.67 ml       Recent Labs     06/04/20  1135  06/04/20  1550  06/05/20  0430 06/05/20  0607 06/05/20  1026   WBC 13.8*  --  15.5*  --  17.4*  --   --    HGB 7.9*   < > 7.8*   < > 9.9* 10.1* 9.3*   HCT 24.1*   < > 23.4*   < > 29.9* 29.4* 27.9*   MCV 92.7  --  92.5  --  91.7  --   --    PLT 52*  --  129*  --  87*  --   --     < > = values in this interval not displayed. Recent Labs     06/03/20  0600  06/04/20  0410 06/04/20  0620 06/04/20  0949 06/04/20  1550 06/05/20  0430 06/05/20  0607      < > 132  --   --  134 127*  --    K 4.8   < > 4.4  --  4.2 4.4 5.1*  --    *   < > 99  --   --  103 97*  --    CO2 22   < > 23  --   --  21* 21*  --    PHOS 2.9  --   --  3.8  --   --   --  2.8   BUN 13   < > 15  --   --  16 18  --    CREATININE 1.0   < > 1.0  --   --  0.9 0.8  --    MG 2.5  --   --  2.4  --   --   --  2.2    < > = values in this interval not displayed. Recent Labs     06/02/20  1610 06/03/20  1400   PROTIME 20.5* 13.4*   INR 1.8 1.2     Recent Labs     06/05/20  0222 06/05/20  0607 06/05/20  1026   CKTOTAL 3,853* 4,046* 3,960*     No results for input(s): BNP in the last 72 hours.   No results for input(s): CHOL, HDL, TRIG in the last 72 hours.     Invalid input(s): CHOLHDLR, LDLCALCU      sterile water injection,   0.9 % sodium chloride bolus, Once  epoprostenol 1,500 mcg in sodium chloride 0.9 % 50 mL nebulization solution, Continuous  nitroGLYCERIN 50 mg in dextrose 5% 250 mL infusion, Continuous  heparin (porcine) 12,500 Units in dextrose 5 % 500 mL infusion, Continuous  cefepime (MAXIPIME) 2 g IVPB extended (mini-bag), Q12H    And  0.9 % sodium chloride infusion admixture, Q12H  0.9 % sodium chloride bolus, Once  amiodarone (CORDARONE) 450 mg in dextrose 5 % 250 mL infusion, Continuous  [START ON 6/10/2020] levothyroxine (SYNTHROID) injection 12.5 mcg, Daily  sodium bicarbonate 50 mEq in dextrose 5 % 1,000 mL infusion, Continuous  0.9 % sodium chloride infusion, Continuous  sodium chloride flush 0.9 % injection 10 mL, 2 times per day  sodium chloride flush 0.9 % injection 10 mL, PRN  potassium chloride 20 mEq/50 mL IVPB (Central Line), PRN  magnesium sulfate 2 g in 50 mL IVPB premix, PRN  acetaminophen (TYLENOL) tablet 650 mg, Q4H PRN  acetaminophen (TYLENOL) suppository 650 mg, Q4H PRN  oxyCODONE (ROXICODONE) immediate release tablet 5 mg, Q4H PRN    Or  oxyCODONE (ROXICODONE) immediate release tablet 10 mg, Q4H PRN  fentaNYL (SUBLIMAZE) injection 25 mcg, Q1H PRN    Or  fentaNYL (SUBLIMAZE) injection 50 mcg, Q1H PRN  sennosides-docusate sodium (SENOKOT-S) 8.6-50 MG tablet 1 tablet, BID  magnesium hydroxide (MILK OF MAGNESIA) 400 MG/5ML suspension 30 mL, Daily PRN  ondansetron (ZOFRAN) injection 4 mg, Q8H PRN  chlorhexidine (PERIDEX) 0.12 % solution 15 mL, BID  magnesium oxide (MAG-OX) tablet 400 mg, Daily  mupirocin (BACTROBAN) 2 % ointment, BID  propofol injection, Continuous PRN  ipratropium-albuterol (DUONEB) nebulizer solution 1 ampule, Q4H WA  albumin human 5 % IV solution 25 g, PRN  0.9 % sodium chloride bolus, Continuous PRN  norepinephrine (LEVOPHED) 16 mg in dextrose 5% 250 mL infusion, Continuous kim. Psych: As above. Assessment/Recommendations  1. Multivessel CAD. Status post two-vessel CABG. Intra-Op cardiac arrest.  Intra-Op transient severe mitral regurgitation. She is still on ECMO. The balloon pump has been changed to Impella. 2. Excellent cardiac output. CVP and pulmonary pressures are elevated. I recommend Lasix. I did discuss with CT surgery. They would like to hold off on Lasix until they can get the ECMO out potentially today. 3. Pulmonary embolism 4/20/2020. Resolved on repeat CAT scan. 4. Baseline hypertension. 5. Hypercholesterolemia. 6. Hypothyroidism. On replacement therapy. 7. Carotid stenosis.

## 2020-06-05 NOTE — PROGRESS NOTES
Admit Date: 5/26/2020      Subjective:     Patient: no acute issues reported overnight; still on ECMO; impella placed  Medication side effects: none    Scheduled Meds:   sodium chloride  20 mL Intravenous Once    cefepime  2 g Intravenous Q12H    And    sodium chloride   Intravenous Q12H    sodium chloride  20 mL Intravenous Once    [START ON 6/10/2020] levothyroxine  12.5 mcg Intravenous Daily    sodium chloride flush  10 mL Intravenous 2 times per day    sennosides-docusate sodium  1 tablet Oral BID    chlorhexidine  15 mL Mouth/Throat BID    magnesium oxide  400 mg Oral Daily    mupirocin   Nasal BID    ipratropium-albuterol  1 ampule Inhalation Q4H WA    insulin lispro  0-18 Units Subcutaneous Q4H    hydrocortisone sodium succinate PF  100 mg Intravenous Q8H    pantoprazole  40 mg Intravenous Daily    And    sodium chloride (PF)  10 mL Intravenous Daily    budesonide  0.5 mg Nebulization BID    And    Arformoterol Tartrate  15 mcg Nebulization BID    montelukast  10 mg Oral Nightly     Continuous Infusions:   epoprostenol (VELETRI) nebulization solution 50 ng/kg/min (06/04/20 1959)    nitroGLYCERIN Stopped (06/04/20 1130)    IV infusion builder 20 mL/hr at 06/04/20 1433    amiodarone 450mg/250ml D5W infusion 0.5 mg/min (06/05/20 0043)    sodium bicarbonate infusion 50 mL/hr at 06/04/20 1210    sodium chloride 30 mL/hr (06/04/20 1913)    propofol 15 mcg/kg/min (06/04/20 2329)    sodium chloride      norepinephrine      insulin Stopped (06/04/20 1806)    dextrose      nitroGLYCERIN      EPINEPHrine infusion 2 mcg/min (06/04/20 1912)    vasopressin (Septic Shock) infusion 0.02 Units/min (06/05/20 0051)    lidocaine 1 mg/min (06/04/20 1130)    milrinone 0.5 mcg/kg/min (06/05/20 0443)    fentaNYL 5 mcg/ml in 0.9%  ml infusion 25 mcg/hr (06/04/20 1207)    heparin (porcine) 10 Units/kg/hr (06/04/20 1522)     PRN Meds:sodium chloride flush, potassium chloride, magnesium sulfate, acetaminophen, acetaminophen, oxyCODONE **OR** oxyCODONE, fentanNYL **OR** fentanNYL, magnesium hydroxide, ondansetron, propofol, albumin human, sodium chloride, norepinephrine, insulin, glucose, dextrose, glucagon (rDNA), dextrose, nitroGLYCERIN, aluminum & magnesium hydroxide-simethicone, calcium gluconate IVPB    Objective:   I/O last 3 completed shifts: In: 7795.7 [I.V.:4351; Blood:2310.7;  IV Piggyback:1134]  Out: 1273 [Urine:825; Emesis/NG output:50; Chest Tube:398]  I/O this shift:  In: 1042 [I.V.:1042]  Out: 415 [Urine:225; Emesis/NG output:100; Chest Tube:90]    /87   Pulse 98   Temp 98.4 °F (36.9 °C) (Core)   Resp 10   Ht 5' 1\" (1.549 m)   Wt 174 lb 8 oz (79.2 kg)   SpO2 95%   BMI 32.97 kg/m²   General appearance: sedated  Skin:negative  Heent:negative  Lungs: diminished breath sounds bibasilar  Heart: regular rate and rhythm, S1, S2 normal, no murmur, click, rub or gallop  Abdomen: soft, non-tender; bowel sounds normal; no masses,  no organomegaly  Extremities:no edema; right groin hematoma  Neurologic: sedated    Labs:  CBC with Differential:    Lab Results   Component Value Date    WBC 17.4 06/05/2020    RBC 3.26 06/05/2020    HGB 10.1 06/05/2020    HCT 29.4 06/05/2020    PLT 87 06/05/2020    MCV 91.7 06/05/2020    MCH 30.4 06/05/2020    MCHC 33.1 06/05/2020    RDW 15.6 06/05/2020    SEGSPCT 52 02/23/2011    LYMPHOPCT 12.2 05/26/2020    MONOPCT 6.1 05/26/2020    BASOPCT 0.2 05/26/2020    MONOSABS 1.01 05/26/2020    LYMPHSABS 2.03 05/26/2020    EOSABS 0.02 05/26/2020    BASOSABS 0.03 05/26/2020     BMP:    Lab Results   Component Value Date     06/05/2020    K 5.1 06/05/2020    K 3.9 05/26/2020    CL 97 06/05/2020    CO2 21 06/05/2020    BUN 18 06/05/2020    LABALBU 2.7 06/05/2020    CREATININE 0.8 06/05/2020    CALCIUM 7.8 06/05/2020    GFRAA >60 06/05/2020    LABGLOM >60 06/05/2020     PT/INR:    Lab Results   Component Value Date    PROTIME 13.4 06/03/2020    INR 1.2 06/03/2020

## 2020-06-06 NOTE — PROGRESS NOTES
Nutrition Assessment    Type and Reason for Visit: Reassess, Consult (TF recommendations)     Nutrition Recommendations: When TF initiated would recommend Lower Calorie High Protein formula @ 45ml/hr continuous to provide 1080 ml TV, 1080 kcals, 95 gm protein,902 ml free water. Current propofol rate providing ~277 kcals lipids- 1357 total calorie daily w/ TF+ propofol. Pt also current w/ continued pressors running- current formula non-fiber containing to support hemodynamic status. Noted hyponatremia noted- will defer fluid management per critical care team     Will monitor& follow. Nutrition Assessment: Pt now w/ need for EN for nutritional support d/t continued intubation 2/2 cardiogenic shock s/p CABG x 2. ECMO d/c. Will provide updated TF recs. Malnutrition Assessment:  · Malnutrition Status: At risk for malnutrition  · Context:    · Findings of the 6 clinical characteristics of malnutrition (Minimum of 2 out of 6 clinical characteristics is required to make the diagnosis of moderate or severe Protein Calorie Malnutrition based on AND/ASPEN Guidelines):  1. Energy Intake-Less than or equal to 75% of estimated energy requirement, Greater than or equal to 7 days    2. Weight Loss-No significant weight loss,    3. Fat Loss-No significant subcutaneous fat loss,    4. Muscle Loss-No significant muscle mass loss,    5. Fluid Accumulation-No significant fluid accumulation,    6.  Strength-Not measured    Nutrition Risk Level: High    Nutrient Needs:  · Estimated Daily Total Kcal: 0394-2219(PS03B: REE= 1231.  MV= 6.52, Tmax= 37.5 )  · Estimated Daily Protein (g): 85-95(1.8-2.0)  · Estimated Daily Total Fluid (ml/day): per critical care     Nutrition Diagnosis:   · Problem: Inadequate oral intake  · Etiology: related to Impaired respiratory function-inability to consume food     Signs and symptoms:  as evidenced by NPO status due to medical condition, Intubation, Nutrition support - EN    Objective Information:  · Nutrition-Focused Physical Findings: +14L I/os, intubated/sedated, distended abd, absent bs, +3 pitting edema, WoundVAC to groin 2/2 hematoma, MAP WNL w/ pressors on, OGT, CT x 3, hyponatremia noted, ECMO d/c   · Wound Type: Multiple, Surgical Wound  · Current Nutrition Therapies:  · Oral Diet Orders: NPO   · Oral Diet intake: 51-75%(average prior to surgery)  · Oral Nutrition Supplement (ONS) Orders: None  · Anthropometric Measures:  · Ht: 5' 1\" (154.9 cm)   · Current Body Wt: 182 lb (82.6 kg)(6/6 actual bedscale )  · Admission Body Wt: 133 lb (60.3 kg)(5/28 first measured )  · Usual Body Wt: 134 lb (60.8 kg)(7/2019 EMR actual )  · % Weight Change:  ,  Noted wt gain since admit- likely 2/2 fluids w/ noted +14 L I/Os & pitting edema   · Ideal Body Wt: 105 lb (47.6 kg), % Ideal Body 122%(using last RD wt 128# )  · BMI Classification: BMI 18.5 - 24.9 Normal Weight(using last wt per RD assessment 2/2 fluids )    Nutrition Interventions:   Continue NPO, Start Tube Feeding  Continued Inpatient Monitoring    Nutrition Evaluation:   · Evaluation: Goals set   · Goals: TF to goal w/ good tolernace     · Monitoring: Nutrition Progression, TF Intake, TF Tolerance, Skin Integrity, Wound Healing, I&O, Mental Status/Confusion, Weight, Pertinent Labs, Monitor Hemodynamic Status, Monitor Bowel Function      Electronically signed by Logan Lee RD, NELLY on 6/6/20 at 9:17 AM EDT    Contact Number: x [phone]

## 2020-06-06 NOTE — PROGRESS NOTES
--     < > = values in this interval not displayed. Recent Labs     06/06/20  0355   *   ALT 28   ALKPHOS 36       Recent Labs     06/06/20  0213 06/06/20  0630 06/06/20  1000   CKTOTAL 6,027* 5,551* 5,595*       Last 3 Troponin:    Lab Results   Component Value Date    TROPONINI <0.01 05/27/2020    TROPONINI <0.01 05/26/2020    TROPONINI <0.01 05/23/2020       Chest x-ray: Cardiomegaly with patchy infiltrate bilaterally with haziness at the left costophrenic angle. Assessment:  -Status post two-vessel CABG, Intra-Op cardiac arrest and transient severe mitral regurgitation, required ECMO that has been removed yesterday and now she has Impala. CVP is elevated at 16 and mean PA pressures elevated at 28. She is fluid balance positive but has been diuresing well.  -Cardiogenic shock: On multiple pressors.  -Postop anemia.  -Severe hypoalbuminemia.  -Thrombocytopenia. -Hyponatremia: Probably due to fluid overload.  -Leukocytosis with low-grade fever. Plan:  -Continue supporting blood pressure with pressors.  -Continue intravenous Lasix drip.  -Supplement potassium as needed.  -Watch for bleeding due to thrombocytopenia and being on intravenous heparin.  -Follow-up kidney function electrolytes, magnesium and CBC. Electronically signed by Magdalena Krueger MD on 6/6/2020 at 2:45 PM  [facility].

## 2020-06-06 NOTE — PROGRESS NOTES
S/p CABG   Vfib arrest   IABP, V/A ECMO  - converted to R axillary Impella   NSR, No further arrhythmia  RV improved, LV remains depressed with EF ~20%  Lasix gtt   Continue LV support, titrate vaso as needed to maintain MAP

## 2020-06-06 NOTE — PROGRESS NOTES
(porcine) 300 Units/hr (06/06/20 1036)    epoprostenol (VELETRI) nebulization solution 50 ng/kg/min (06/06/20 1041)    IV infusion builder 20 mL/hr at 06/05/20 2124    amiodarone 450mg/250ml D5W infusion 0.25 mg/min (06/06/20 0800)    sodium bicarbonate infusion 50 mL/hr at 06/06/20 0830    sodium chloride 30 mL/hr (06/06/20 1040)    propofol 30 mcg/kg/min (06/06/20 1423)    sodium chloride      norepinephrine      insulin Stopped (06/06/20 0640)    dextrose      nitroGLYCERIN      EPINEPHrine infusion 3 mcg/min (06/06/20 0800)    vasopressin (Septic Shock) infusion 0.05 Units/min (06/06/20 1235)    milrinone 0.5 mcg/kg/min (06/06/20 0800)    fentaNYL 5 mcg/ml in 0.9%  ml infusion 50 mcg/hr (06/06/20 0800)       PRN Meds:  artificial tears, heparin (porcine), sodium chloride flush, potassium chloride, magnesium sulfate, acetaminophen, acetaminophen, oxyCODONE **OR** oxyCODONE, fentanNYL **OR** fentanNYL, magnesium hydroxide, ondansetron, propofol, sodium chloride, norepinephrine, insulin, glucose, dextrose, glucagon (rDNA), dextrose, nitroGLYCERIN, aluminum & magnesium hydroxide-simethicone, calcium gluconate IVPB      Physical Exam:  Physical Exam  Constitutional:       Appearance: She is ill-appearing. Interventions: She is sedated and intubated. HENT:      Head: Normocephalic and atraumatic. Eyes:      Conjunctiva/sclera: Conjunctivae normal.   Neck:      Musculoskeletal: Neck supple. Trachea: No tracheal deviation. Cardiovascular:      Rate and Rhythm: Normal rate and regular rhythm. Heart sounds: Normal heart sounds. Comments: Femoral catheters  Pulmonary:      Effort: Pulmonary effort is normal. She is intubated. Breath sounds: Normal breath sounds. Comments: +chest tubes in place  Abdominal:      General: Bowel sounds are normal.      Palpations: Abdomen is soft. Tenderness: There is no abdominal tenderness.    Musculoskeletal:         General:

## 2020-06-06 NOTE — FLOWSHEET NOTE
Dr. Tank Bates updated that vaso was increased to 0.06units/min @1430 for low MAP 68, Bp improved now 102/79,map 86, fi02 increased to 65% for sp02 88%, improved to 92% now, pt receiving RBC for 9/25.4 and a 6 pack of platelets for 76, albumin 1.9, sodium 122, bun/cr 23/1.2

## 2020-06-07 NOTE — PROGRESS NOTES
Danay Casillas M.D.,[facility]  Lupe Zapata D.O., F.A.C.O.I., Leopoldo Fink M.D. Conception Osler, M.D., Gabriella Lane M.D. Chino Kaur D.O. Daily Pulmonary Progress Note    Patient:  Kurt Potter 77 y.o. female MRN: [id number]     Date of Service: 6/7/2020        Subjective      Patient was seen and examined. Disucssed with ICU staff at bedside. Patient had CABG. Had intra-op CPA. Developed severe MR. Was placed on balloon pump, ECMO, and pressors. Patient remains critical .     ECMO removed. Currently on Impella. Patient started on lasix gtt with adequate Uop. Vasopressin weaned off.     Objective   Vitals: /76   Pulse 122   Temp 99 °F (37.2 °C) (Core)   Resp 27   Ht 5' 1\" (1.549 m)   Wt 194 lb 6.4 oz (88.2 kg)   SpO2 99%   BMI 36.73 kg/m²     I/O:    Intake/Output Summary (Last 24 hours) at 6/7/2020 1428  Last data filed at 6/7/2020 1400  Gross per 24 hour   Intake 4673.09 ml   Output 2765 ml   Net 1908.09 ml       CURRENT MEDS :  Scheduled Meds:   insulin lispro  0-18 Units Subcutaneous Q4H    sodium chloride  20 mL Intravenous Once    cefepime  2 g Intravenous Q12H    And    sodium chloride   Intravenous Q12H    sodium chloride  20 mL Intravenous Once    [START ON 6/10/2020] levothyroxine  12.5 mcg Intravenous Daily    sodium chloride flush  10 mL Intravenous 2 times per day    sennosides-docusate sodium  1 tablet Oral BID    chlorhexidine  15 mL Mouth/Throat BID    magnesium oxide  400 mg Oral Daily    ipratropium-albuterol  1 ampule Inhalation Q4H WA    hydrocortisone sodium succinate PF  100 mg Intravenous Q8H    pantoprazole  40 mg Intravenous Daily    And    sodium chloride (PF)  10 mL Intravenous Daily    budesonide  0.5 mg Nebulization BID    And    Arformoterol Tartrate  15 mcg Nebulization BID    montelukast  10 mg Oral Nightly       Continuous Infusions:   furosemide (LASIX) 1mg/ml infusion 10 mg/hr (06/07/20 1129)    heparin (porcine)

## 2020-06-07 NOTE — PROGRESS NOTES
°C)    Respiratory Rate : Resp  Av.7  Min: 16  Max: 31    Pulse Range: Pulse  Av  Min: 101  Max: 119    Blood Presuure Range:  Systolic (99NBJ), AQO:890 , Min:86 , MNP:022   ; Diastolic (43VUD), LKX:02, Min:66, Max:83      Pulse ox Range: SpO2  Av.8 %  Min: 91 %  Max: 100 %    24hr I & O:      Intake/Output Summary (Last 24 hours) at 2020 0908  Last data filed at 2020 0800  Gross per 24 hour   Intake 5772.09 ml   Output 2510 ml   Net 3262.09 ml       Constitutional:  awake, alert, cooperative, no apparent distress, and appears stated age  Eyes:  pupils equal, round and reactive to light  ENT:  normocepalic, without obvious abnormality  FACIAL SWELLING  NECK:  supple, symmetrical, trachea midline  HEMATOLOGIC/LYMPHATICS:  no cervical lymphadenopathy  LUNGS: COARSE SOUNDS/CHEST TUBE  CARDIOVASCULAR:  Normal apical impulse, regular rate and rhythm, normal S1 and S2, no S3 or S4, and no murmur noted  ABDOMEN:  normal bowel sounds, non-distended, non-tender, no masses palpated. BRUISED LOWER AREA  MUSCULOSKELETAL:  SEDATED.   SKIN:  normal skin color, texture, turgor  PEDAL EDEMA  Data      CBC:   Lab Results   Component Value Date    WBC 19.5 2020    RBC 3.13 2020    HGB 9.5 2020    HCT 27.3 2020    MCV 87.2 2020    MCH 30.4 2020    MCHC 34.8 2020    RDW 14.6 2020    PLT 80 2020    MPV 10.0 2020     BMP:    Lab Results   Component Value Date     2020    K 4.1 2020    K 3.9 2020    CL 84 2020    CO2 21 2020    BUN 26 2020    CREATININE 1.3 2020    CALCIUM 7.1 2020    GFRAA 48 2020    LABGLOM 40 2020    GLUCOSE 233 2020    GLUCOSE 118 2011     PT/INR:  No results found for: PTINR  Last 3 Troponin:  No components found for: TROPININI      ASSESSMENT:      Active Problems:    Hypoxia    Dyspnea    Nonrheumatic tricuspid valve regurgitation    Hypokalemia    ANG

## 2020-06-07 NOTE — PLAN OF CARE
Patient is nervious about upcoming surgery, scheduled for [facility] 10, patient states doesn't want to wait that long wants to start to feel better.  Had a PE in April and has been unable to breath and do activities as she would like to
Problem: Cardiac Output - Decreased:  Goal: Hemodynamic stability will improve  Description: Hemodynamic stability will improve  6/7/2020 0827 by Jono Viveros RN  Outcome: Met This Shift  Note: Monitor vital signs, titrate drips keeping MAP greater then 70, monitor fluid balance, monitor lab values and replaces as indicated. 6/7/2020 0242 by Maximiliano Hernandez RN  Outcome: Ongoing     Problem: Fluid Volume - Imbalance:  Goal: Ability to achieve a balanced intake and output will improve  Description: Ability to achieve a balanced intake and output will improve  6/7/2020 0827 by Jono Viveros RN  Outcome: Met This Shift  Note: Will continue to monitor Intake and outputs, monitor chest tube drainage, NJ output, and wound vac. Monitor the patients weight, and albumin levels. Titrate lasix as indicated for urine output.    6/7/2020 0242 by Maximiliano Hernandez RN  Outcome: Ongoing
Problem: Falls - Risk of:  Goal: Will remain free from falls  Description: Will remain free from falls  5/28/2020 0123 by Ja Crum RN  Outcome: Met This Shift  5/27/2020 1806 by Stephen Flores RN  Outcome: Met This Shift
Problem: Falls - Risk of:  Goal: Will remain free from falls  Description: Will remain free from falls  5/29/2020 1455 by Svetlana Jones RN  Outcome: Met This Shift  5/29/2020 1333 by Rosie Juares RN  Outcome: Met This Shift
Problem: Falls - Risk of:  Goal: Will remain free from falls  Description: Will remain free from falls  6/1/2020 1451 by Jane Conte RN  Outcome: Met This Shift     Problem: PAIN  Goal: Patient's pain/discomfort is manageable  6/1/2020 1451 by Jane Conte RN  Outcome: Met This Shift     Problem: SKIN INTEGRITY  Goal: Skin integrity is maintained or improved  6/1/2020 1451 by Jane Conte RN  Outcome: Met This Shift
Problem: Falls - Risk of:  Goal: Will remain free from falls  Description: Will remain free from falls  6/1/2020 2309 by Ranjit Duke RN  Outcome: Met This Shift  6/1/2020 1451 by Jane Conte RN  Outcome: Met This Shift     Problem: DAILY CARE  Goal: Daily care needs are met  Outcome: Met This Shift     Problem: PAIN  Goal: Patient's pain/discomfort is manageable  6/1/2020 2309 by Ranjit Duke RN  Outcome: Met This Shift  6/1/2020 1451 by Jane Conte RN  Outcome: Met This Shift     Problem: SKIN INTEGRITY  Goal: Skin integrity is maintained or improved  6/1/2020 1451 by Jane Cnote RN  Outcome: Met This Shift     Problem: DISCHARGE BARRIERS  Goal: Patient's continuum of care needs are met  Outcome: Met This Shift     Problem: Pain:  Goal: Pain level will decrease  Description: Pain level will decrease  Outcome: Met This Shift
Problem: Falls - Risk of:  Goal: Will remain free from falls  Description: Will remain free from falls  Outcome: Met This Shift  Goal: Absence of physical injury  Description: Absence of physical injury  Outcome: Met This Shift     Problem: DAILY CARE  Goal: Daily care needs are met  Outcome: Met This Shift     Problem: PAIN  Goal: Patient's pain/discomfort is manageable  Outcome: Met This Shift     Problem: SKIN INTEGRITY  Goal: Skin integrity is maintained or improved  Outcome: Met This Shift     Problem: KNOWLEDGE DEFICIT  Goal: Patient/S.O. demonstrates understanding of disease process, treatment plan, medications, and discharge instructions.   Outcome: Met This Shift     Problem: DISCHARGE BARRIERS  Goal: Patient's continuum of care needs are met  Outcome: Met This Shift     Problem: Pain:  Goal: Pain level will decrease  Description: Pain level will decrease  Outcome: Met This Shift  Goal: Control of acute pain  Description: Control of acute pain  Outcome: Met This Shift  Goal: Control of chronic pain  Description: Control of chronic pain  Outcome: Met This Shift     Problem: Breathing Pattern - Ineffective:  Goal: Ability to achieve and maintain a regular respiratory rate will improve  Description: Ability to achieve and maintain a regular respiratory rate will improve  Outcome: Met This Shift
Problem: Falls - Risk of:  Goal: Will remain free from falls  Description: Will remain free from falls  Outcome: Ongoing  Goal: Absence of physical injury  Description: Absence of physical injury  Outcome: Ongoing     Problem: SKIN INTEGRITY  Goal: Skin integrity is maintained or improved  Outcome: Ongoing     Problem: Cardiac Output - Decreased:  Goal: Cardiac output within specified parameters  Description: Cardiac output within specified parameters  Outcome: Ongoing  Goal: Hemodynamic stability will improve  Description: Hemodynamic stability will improve  Outcome: Ongoing     Problem: Fluid Volume - Imbalance:  Goal: Ability to achieve a balanced intake and output will improve  Description: Ability to achieve a balanced intake and output will improve  Outcome: Ongoing  Goal: Chest tube drainage is within specified parameters  Description: Chest tube drainage is within specified parameters  Outcome: Ongoing     Problem: Gas Exchange - Impaired:  Goal: Levels of oxygenation will improve  Description: Levels of oxygenation will improve  Outcome: Ongoing  Goal: Ability to maintain adequate ventilation will improve  Description: Ability to maintain adequate ventilation will improve  Outcome: Ongoing
Shift  Goal: Hemodynamic stability will improve  Description: Hemodynamic stability will improve  Outcome: Met This Shift     Problem: Fluid Volume - Imbalance:  Goal: Ability to achieve a balanced intake and output will improve  Description: Ability to achieve a balanced intake and output will improve  Outcome: Met This Shift  Goal: Chest tube drainage is within specified parameters  Description: Chest tube drainage is within specified parameters  Outcome: Met This Shift     Problem: Gas Exchange - Impaired:  Goal: Levels of oxygenation will improve  Description: Levels of oxygenation will improve  Outcome: Met This Shift  Goal: Ability to maintain adequate ventilation will improve  Description: Ability to maintain adequate ventilation will improve  Outcome: Met This Shift     Problem: Inadequate oral food/beverage intake (NI-2.1)  Goal: Food and/or Nutrient Delivery  Description: Individualized approach for food/nutrient provision.   6/6/2020 0924 by Tariq Brannon RD, LD  Outcome: Met This Shift

## 2020-06-07 NOTE — PROGRESS NOTES
Inhalation Q4H WA    hydrocortisone sodium succinate PF  100 mg Intravenous Q8H    pantoprazole  40 mg Intravenous Daily    And    sodium chloride (PF)  10 mL Intravenous Daily    budesonide  0.5 mg Nebulization BID    And    Arformoterol Tartrate  15 mcg Nebulization BID    montelukast  10 mg Oral Nightly      PHYSICAL EXAM:    BP 99/78   Pulse 101   Temp 99 °F (37.2 °C) (Core)   Resp 26   Ht 5' 1\" (1.549 m)   Wt 194 lb 6.4 oz (88.2 kg)   SpO2 96%   BMI 36.73 kg/m²     Intake/Output Summary (Last 24 hours) at 6/7/2020 0729  Last data filed at 6/7/2020 0600  Gross per 24 hour   Intake 5772.09 ml   Output 2470 ml   Net 3302.09 ml        Gen intubated sedated  CVS  tachycardic  Resp  vented  R LE + ecchymnosis groin   DP and PT weakly biphasic  L LE Wound vac in place groin, + seal   Popliteal weakly biphasic   DP and PT signal not present   Brisk cap refill of toes  LABS:    Lab Results   Component Value Date    WBC 19.5 (H) 06/07/2020    HGB 9.5 (L) 06/07/2020    HCT 27.3 (L) 06/07/2020    PLT 80 (L) 06/07/2020    PROTIME 13.4 (H) 06/03/2020    INR 1.2 06/03/2020    APTT 44.2 (H) 06/07/2020    K 4.1 06/07/2020    BUN 26 (H) 06/07/2020    CREATININE 1.3 (H) 06/07/2020     A/P s/p L femoral exploration, removal of ecmo, repair of left CFV, CFA 6/5/20  PVD  · Vascular exam stable  · No signs of obvious ischemic of the left lower extremity  · She has chronic popilteal artery occlusion noted on angiogram yesterday but with AT flow present into the foot  · Continue wound vac - drainage slowed down  · Remains on pressors    Former[facility]

## 2020-06-07 NOTE — FLOWSHEET NOTE
[address] [facility] VIA PHONE NOTIFIED OF VITAL SIGNS URINE OUTPUT AND GENERAL STATUS, NOTIFIED VASO RESTARTED AT 0.02, AMNIO INCREASED TO 0.5MG

## 2020-06-08 ENCOUNTER — APPOINTMENT (OUTPATIENT)
Dept: GENERAL RADIOLOGY | Age: 75
DRG: 003 | End: 2020-06-08
Payer: MEDICARE

## 2020-06-08 VITALS
RESPIRATION RATE: 1 BRPM | TEMPERATURE: 99.1 F | HEIGHT: 61 IN | BODY MASS INDEX: 36.7 KG/M2 | WEIGHT: 194.4 LBS | DIASTOLIC BLOOD PRESSURE: 28 MMHG | SYSTOLIC BLOOD PRESSURE: 32 MMHG | OXYGEN SATURATION: 80 %

## 2020-06-08 LAB
POC ACT LR: 154 SECONDS
POC ACT LR: 155 SECONDS
POC ACT LR: 157 SECONDS
POC ACT LR: 158 SECONDS
POC ACT LR: 158 SECONDS
POC ACT LR: 161 SECONDS
POC ACT LR: 162 SECONDS
POC ACT LR: 164 SECONDS
POC ACT LR: 168 SECONDS
POC ACT LR: 168 SECONDS
POC ACT LR: 169 SECONDS
POC ACT LR: 172 SECONDS
POC ACT LR: 173 SECONDS
POC ACT LR: 173 SECONDS
POC ACT LR: 174 SECONDS
POC ACT LR: 175 SECONDS
POC ACT LR: 175 SECONDS
POC ACT LR: 176 SECONDS
POC ACT LR: 178 SECONDS
POC ACT LR: 189 SECONDS
POC ACT LR: 191 SECONDS
POC ACT LR: 194 SECONDS
POC ACT LR: 195 SECONDS
POC ACT LR: 198 SECONDS
POC ACT LR: 226 SECONDS
POC ACT LR: >400 SECONDS

## 2020-06-08 NOTE — FLOWSHEET NOTE
Patient status was rapidly decompensating. Blood pressure steadily dropping with a maps in the 50's. Code button pushed. Medical and surgical residents present. Pt further decompensated. Patient went into vfib on the monitor. Code was intiated at 2320-Compression started   -2333 Epi given  -2322 pulse check-no pulse- compressions continued  -2323 ABGs drawn  -2323 Epi given   -2324 Pulse check- no pulse-compression continued  -2324 1 amp sodium bicarb  -2326 Epi given   -2326 Pulse check- no pulse- compressions continued  -2326 Charge- shock- no pulse  -2327 1 amp of sodium bicarb   -2329 Epi given   -2330 Pulse check- asystole- compressions continued  -2331 1g calcium gluconate  -2332 Pulse check- sysytole-compressions continued  -2332 D50 and 10 units regular insulin given  -2333 Epi given   -2334 Pulse check- asystole- compressions continued  -2335 ABGS drawn  -2336 Pulse check- Charged- shocked- compressions continued  -2336 Epi given   -2338 Pulse check- PEA-compressions continued  -2339 Epi given   -2340 Pulse check- no pulse-PEA arrest    Time of death 2340    Dr Dary Berman arrived during code. Updated of patient status per medical and surgical residents. Family called and updated during code by Dr Laila Meeks and after code by Dr Dary Berman .

## 2020-06-08 NOTE — PROGRESS NOTES
Dr. Howard Lyn updated regarding change in patients condition at 2101- \"Patients blood pressure is 73/66 MAP-68. Vaso is running at .07 mcg/ Epi- 4mcg. Patient is still having ectopy and is responding to the vaso for a short time and her pressure starts to drop again. Updated about Magnesium and potassium levels as well as Impella alarming. \"      Physician advised to discontinue swan, give more  albumin, and to check in with impella representative. 2109- informed that all medications were running through the swan and asked for line placement as well as current BP.     2111- Dr. Howard Lyn advised to leave swan in place. 2128-Dr. Howard Lyn updated regarding patients status,  \"Levo is at 10 mcg / Epi-4 mcg/ Vaso is at 0.08 mcg. We gave 250 of albumin and started another 250. We turned off her Lasix. Her pressure is sitting in the 76'U systolic and her map's are in the 70's. ST rhythm with minimal ectopy. We are going to call valeriy about the suction alarm. \"    2134- Impella had us turn it down to P-6 still intermittently alarming. They said I can try to turn P down again to p5 to try to clear the alarm.  Its either fluid status, a placement issue, or RV function./ Updated on MAP and pressures as well

## 2020-06-09 LAB
BLOOD BANK DISPENSE STATUS: NORMAL
BLOOD BANK PRODUCT CODE: NORMAL
BPU ID: NORMAL
DESCRIPTION BLOOD BANK: NORMAL

## (undated) DEVICE — TAPE ADH W2INXL10YD WHT PAPR GENTLE BRTH FLX COMFORTABLE

## (undated) DEVICE — Z DISCONTINUED PER MEDLINE USE 2425483 TAPE UMB L30IN DIA1/8IN WHT COT NONABSORBABLE W/O NDL FOR

## (undated) DEVICE — DRAIN CHN 19FR L0.25MM DIA6.3MM SIL RND HUBLESS FULL FLUT

## (undated) DEVICE — SOLUTION IV IRRIG 500ML 0.9% SODIUM CHL 2F7123

## (undated) DEVICE — BASIC SINGLE BASIN 1-LF: Brand: MEDLINE INDUSTRIES, INC.

## (undated) DEVICE — BLADE CLIPPER GEN PURP NS

## (undated) DEVICE — Device

## (undated) DEVICE — SET INSTR ART 1

## (undated) DEVICE — TTL1LYR 16FR10ML 100%SIL TMPST TR: Brand: MEDLINE

## (undated) DEVICE — PICO 7 10CM X 30CM: Brand: PICO™ 7

## (undated) DEVICE — DRESSING TRNSPAR W3XL4IN SILIC1 POLYUR RECT NET W/ SAFETAC

## (undated) DEVICE — SET SURG INSTR ART III

## (undated) DEVICE — RETROGRADE CARDIOPLEGIA CATHETER: Brand: EDWARDS LIFESCIENCES RETROGRADE CARDIOPLEGIA CATHETER

## (undated) DEVICE — BLANKET WRM W35.4XL86.6IN FULL UNDERBODY + FORC AIR

## (undated) DEVICE — CANNULA PERF 7FR L5.5IN AORT ROOT RADPQ STD TIP W/ VENT LN

## (undated) DEVICE — DRAIN SURG SGL COLL PT TB FOR ATS BG OASIS

## (undated) DEVICE — PACK,UNIV, II AURORA: Brand: MEDLINE

## (undated) DEVICE — GRADUATE

## (undated) DEVICE — CATHETER IV 20GA L1IN OD1.08MM ID0.8MM PNK VIALON

## (undated) DEVICE — LABEL MED 4 IN SURG PANEL W/ PEN STRL

## (undated) DEVICE — SET CARDIAC I

## (undated) DEVICE — Device: Brand: CLEANCUT ROTATING AORTIC PUNCH

## (undated) DEVICE — SURGICAL BRA: Brand: DEROYAL

## (undated) DEVICE — LANCET AORTOTOMY 3.3X10MM

## (undated) DEVICE — RADIFOCUS GLIDEWIRE: Brand: GLIDEWIRE

## (undated) DEVICE — PADDLE INTERN DEFIB CHILD

## (undated) DEVICE — Z INACTIVE USE 2641837 CLIP LIG M BLU TI HRT SHP WIRE HORZ 600 PER BX

## (undated) DEVICE — Z DUP USE 2257490 ADHESIVE SKIN CLSRE 036ML TPCL 2CTL CNCRLTE HIGH VSCSTY DRMB

## (undated) DEVICE — GLOVE ORANGE PI 7 1/2   MSG9075

## (undated) DEVICE — Z INACTIVE USE 2662641 SOLUTION IV 1000ML 140MEQ/L SOD 5MEQ/L K 3MEQ/L MG 27MEQ/L

## (undated) DEVICE — SURGICAL PROCEDURE PACK BASIC

## (undated) DEVICE — STERILE LATEX POWDER FREE SURGICAL GLOVES WITH HYDROGEL COATING: Brand: PROTEXIS

## (undated) DEVICE — MEDI-TRACE CADENCE ADULT, PRE-CONNECT  DEFIBRILLATION ELECTRODE (10 PR/PK) - PHYSIO-CONTROL: Brand: MEDI-TRACE CADENCE

## (undated) DEVICE — TAPE ADH W2INXL10YD PLAS TRNSPAR H2O RESIST HYPOALRG CURAD

## (undated) DEVICE — CANISTER NEG PRSS 1000ML W/ GEL INFOVAC

## (undated) DEVICE — CATHETER ETER IV 20GA L1IN POLYUR STR RADPQ INTROCAN SFTY

## (undated) DEVICE — GEL US 20GM NONIRRITATING OVERWRAPPED FILE PCH TRNSMIT

## (undated) DEVICE — DILATOR ART

## (undated) DEVICE — SURGICAL PROCEDURE PACK VASC MAJ CUST

## (undated) DEVICE — DOUBLE BASIN SET: Brand: MEDLINE INDUSTRIES, INC.

## (undated) DEVICE — SNAP KAP: Brand: UNBRANDED

## (undated) DEVICE — BAG TRNSF AUTOLGS SUCT AND ANTICOAG LN AUTOLOG

## (undated) DEVICE — GLOVE SURG L12IN SZ 65FNGR THK94MIL TRNSLUC YEL LTX

## (undated) DEVICE — AEGIS 1" DISK 4MM HOLE, PEEL OPEN: Brand: MEDLINE

## (undated) DEVICE — RADIFOCUS GLIDECATH: Brand: GLIDECATH

## (undated) DEVICE — MPS® PRESSURE LINE W/TRANSDUCER: Brand: MPS

## (undated) DEVICE — GOWN,SIRUS,FABRNF,XL,20/CS: Brand: MEDLINE

## (undated) DEVICE — SOLUTION IV 50ML 0.9% SOD CHL PLAS CONT USP VIAFLX

## (undated) DEVICE — KIT DIL 8/12/16/20/24FR NDL 18GA GWIRE L180CM DIA0.035IN

## (undated) DEVICE — CANNULA PERF L72.5CM DIA7.7X8.3MM FEM VEN MINIMALLY

## (undated) DEVICE — BLOOD TRANSFUSION FILTER: Brand: HAEMONETICS

## (undated) DEVICE — CLIP INT M L GRN TI TRNSVRS GRV CHEVRON SHP W/ PRECIS TIP

## (undated) DEVICE — CLOTH SURG PREP PREOPERATIVE CHLORHEXIDINE GLUC 2% READYPREP

## (undated) DEVICE — STAPLER SKIN L39MM DIA0.53MM CRWN 5.7MM S STL FIX HD PROX

## (undated) DEVICE — Z DUP USE 2701075 SYSTEM SKIN CLSR 42CM DERMBND PRINEO

## (undated) DEVICE — GARMENT,MEDLINE,DVT,INT,CALF,MED, GEN2: Brand: MEDLINE

## (undated) DEVICE — TUBING PERF PMP L10FT OD0.25IN ID1/16IN MED CLASS VI PRECUT

## (undated) DEVICE — KIT SURG PREP POVIDONE IOD WET FOR UNIV USE SPNG STK

## (undated) DEVICE — SOLUTION IV IRRIG POUR BRL 0.9% SODIUM CHL 2F7124

## (undated) DEVICE — SET SURG BASIN OPEN HEART NO  1 REUSABLE

## (undated) DEVICE — AVID DUAL STAGE VENOUS DRAINAGE CANNULA: Brand: AVID DUAL STAGE VENOUS DRAINAGE CANNULA

## (undated) DEVICE — RETRACTOR RULTRACT INTERN MAMMARY ARTERY

## (undated) DEVICE — GLOVE SURG SZ 65 THK91MIL LTX FREE SYN POLYISOPRENE

## (undated) DEVICE — INTENDED FOR TISSUE SEPARATION, AND OTHER PROCEDURES THAT REQUIRE A SHARP SURGICAL BLADE TO PUNCTURE OR CUT.: Brand: BARD-PARKER ® STAINLESS STEEL BLADES

## (undated) DEVICE — SYRINGE MED 50ML LUERLOCK TIP

## (undated) DEVICE — DRAPE C ARM W41XL74IN UNIV MOB W RUBBERBAND CLP

## (undated) DEVICE — CONNECTOR PERF 0.25X0.25IN STR W/O LUERLOCK

## (undated) DEVICE — RETRACTOR SURG INSRT SUT HLD OCTOBASE

## (undated) DEVICE — GLOVE SURG SZ 75 STD WHT LTX SYN POLYMER BEAD REINF ANTI RL

## (undated) DEVICE — GLOVE SURG SZ 7.5 L11.73IN FNGR THK9.8MIL STRW LTX POLYMER

## (undated) DEVICE — DRESSING TRNSPAR W4XL45IN FAB FRME SUREVIEW IV

## (undated) DEVICE — PERCUTANEOUS INSERTION KIT-ARTERIAL: Brand: PERCUTANEOUS INSERTION KIT-ARTERIAL

## (undated) DEVICE — SURGICAL PROCEDURE PACK CRD SEHC

## (undated) DEVICE — LOOP VES W25MM THK1MM MAXI RED SIL FLD REPELLENT 100 PER

## (undated) DEVICE — ALCOHOL RUBBING ISO 16OZ 70%

## (undated) DEVICE — CANNULA INJ L2.5IN BLNT TIP 3MM CLR BODY W/ 1 W VLV DLP

## (undated) DEVICE — BLOWER COR ART L16.5CM PLAS SHFT MAL W/ MIST IV SET AXIUS

## (undated) DEVICE — SET ACB VEIN

## (undated) DEVICE — CATHETER,URETHRAL,REDRUBBER,STRL,18FR: Brand: MEDLINE

## (undated) DEVICE — SYRINGE MED 20ML STD CLR PLAS LUERSLIP TIP N CTRL DISP

## (undated) DEVICE — DRESSING HYDROCOLLOID SQ THN 8X2 IN MOIST NO VOL DUODERM

## (undated) DEVICE — GAUZE,SPONGE,4"X4",16PLY,XRAY,STRL,LF: Brand: MEDLINE

## (undated) DEVICE — AGENT HEMSTAT W4XL8IN OXIDIZED REGENERATED CELOS ABSRB

## (undated) DEVICE — TOWEL,OR,DSP,ST,BLUE,STD,6/PK,12PK/CS: Brand: MEDLINE

## (undated) DEVICE — BLADE OPHTH 180DEG CUT SURF BLU STR SHRP DBL BVL GRINDLESS

## (undated) DEVICE — GLOVE SURG SZ 75 L12IN FNGR THK94MIL TRNSLUC YEL LTX

## (undated) DEVICE — LABEL MED CARD SURG 4 IN PANEL STRL

## (undated) DEVICE — KIT SURG W7XL11IN 2 PKT UNTREATED NA

## (undated) DEVICE — CLEANER,CAUTERY TIP,2X2",STERILE: Brand: MEDLINE

## (undated) DEVICE — RESERVOIR CARDOTMY C4L HARDSHELL W/ 40UM FLTR EL SER 4 PRT

## (undated) DEVICE — SCANLAN® VASCU-STATT® SINGLE-USE BULLDOG CLAMP - MIDI ANGLED 45° (WHITE), CLAMPING PRESSURE 25-30 G (2/STERILE PKG): Brand: SCANLAN® VASCU-STATT® SINGLE-USE BULLDOG CLAMP

## (undated) DEVICE — DRESSING FOAM W22XL25CM FILVE LAYR FOAM DP DEF SAFETAC

## (undated) DEVICE — SET CARDIAC II

## (undated) DEVICE — SOLUTION IV IRRIG WATER 1000ML POUR BRL 2F7114

## (undated) DEVICE — GLOVE SURG SZ 6 THK91MIL LTX FREE SYN POLYISOPRENE ANTI

## (undated) DEVICE — 3M™ STERI-DRAPE™ ISOLATION BAG, 10 PER CARTON / 4 CARTONS PER CASE, 1003: Brand: 3M™ STERI-DRAPE™

## (undated) DEVICE — TUBING, SUCTION, 3/16" X 12', STRAIGHT: Brand: MEDLINE

## (undated) DEVICE — PERFUSION PACK CUST OPN HRT

## (undated) DEVICE — INSUFFLATION TUBING SET WITH FILTER, FUNNEL CONNECTOR AND LUER LOCK: Brand: JOSNOE MEDICAL INC

## (undated) DEVICE — DISCONTINUED USE 320496 BATTERY 50-800-01 OR 50-800-03 SNGL USE

## (undated) DEVICE — PATIENT RETURN ELECTRODE, SINGLE-USE, CONTACT QUALITY MONITORING, ADULT, WITH 9FT CORD, FOR PATIENTS WEIGING OVER 33LBS. (15KG): Brand: MEGADYNE

## (undated) DEVICE — PACK OPEN HRT DRP

## (undated) DEVICE — GOWN,SIRUS,FABRNF,L,20/CS: Brand: MEDLINE

## (undated) DEVICE — GLOVE ORANGE PI 7   MSG9070

## (undated) DEVICE — 6 FOOT DISPOSABLE EXTENSION CABLE WITH SAFE CONNECT / SCREW-DOWN

## (undated) DEVICE — MPS® DELIVERY SET W/ARREST AGENT AND ADDITIVE CASSETTES, HEAT EXCHANGER & 10 FT. DELIVERY TUBING: Brand: MPS

## (undated) DEVICE — APPLICATOR MEDICATED 26 CC SOLUTION HI LT ORNG CHLORAPREP

## (undated) DEVICE — SOLUTION IV 250ML 0.9% SOD CHL PH 5 INJ USP VIAFLX PLAS

## (undated) DEVICE — CARDIAC STRYKER STERNAL SAW

## (undated) DEVICE — CLIP INT SM TI EZ LD LIG SYS WECK HORZ

## (undated) DEVICE — YANKAUER,OPEN TIP,W/O VENT,STERILE: Brand: MEDLINE INDUSTRIES, INC.

## (undated) DEVICE — OPTISITE ARTERIAL PERFUSION CANNULA: Brand: OPTISITE ARTERIAL PERFUSION CANNULA

## (undated) DEVICE — CONNECTOR DRNGE W3/8-0.5XH3/16XL3/16IN 2:1 SIL CARD STR

## (undated) DEVICE — DRAIN SURG L3/8-1/2IN DIA3/16IN SIL CARD CONN 1:1 BLAK

## (undated) DEVICE — CONNECTOR PERF W64XH64XL64MM W  LUERLOCK

## (undated) DEVICE — Device: Brand: VIRTUOSAPH PLUS WITH RADIAL INDICATION

## (undated) DEVICE — 1.5L THIN WALL CAN: Brand: CRD

## (undated) DEVICE — TOWEL,OR,DSP,ST,WHITE,DLX,4/PK,20PK/CS: Brand: MEDLINE

## (undated) DEVICE — EZ GLIDE AORTIC CANNULA: Brand: EDWARDS LIFESCIENCES EZ GLIDE AORTIC CANNULA

## (undated) DEVICE — CAMERA CARDIAC STRYKER 1488 HD